# Patient Record
Sex: MALE | Race: WHITE | NOT HISPANIC OR LATINO | Employment: FULL TIME | URBAN - METROPOLITAN AREA
[De-identification: names, ages, dates, MRNs, and addresses within clinical notes are randomized per-mention and may not be internally consistent; named-entity substitution may affect disease eponyms.]

---

## 2017-02-17 ENCOUNTER — TRANSCRIBE ORDERS (OUTPATIENT)
Dept: ADMINISTRATIVE | Facility: HOSPITAL | Age: 57
End: 2017-02-17

## 2017-02-17 ENCOUNTER — ALLSCRIPTS OFFICE VISIT (OUTPATIENT)
Dept: OTHER | Facility: OTHER | Age: 57
End: 2017-02-17

## 2017-02-17 ENCOUNTER — HOSPITAL ENCOUNTER (OUTPATIENT)
Dept: RADIOLOGY | Facility: HOSPITAL | Age: 57
Discharge: HOME | End: 2017-02-17
Attending: FAMILY MEDICINE
Payer: COMMERCIAL

## 2017-02-17 DIAGNOSIS — J20.9 ACUTE BRONCHITIS: ICD-10-CM

## 2017-02-17 PROCEDURE — 71020 HB CHEST X-RAY 2VW FRONTAL&LATL: CPT

## 2017-02-18 ENCOUNTER — ALLSCRIPTS OFFICE VISIT (OUTPATIENT)
Dept: OTHER | Facility: OTHER | Age: 57
End: 2017-02-18

## 2017-02-20 ENCOUNTER — GENERIC CONVERSION - ENCOUNTER (OUTPATIENT)
Dept: OTHER | Facility: OTHER | Age: 57
End: 2017-02-20

## 2017-04-29 ENCOUNTER — ALLSCRIPTS OFFICE VISIT (OUTPATIENT)
Dept: OTHER | Facility: OTHER | Age: 57
End: 2017-04-29

## 2017-05-01 ENCOUNTER — GENERIC CONVERSION - ENCOUNTER (OUTPATIENT)
Dept: OTHER | Facility: OTHER | Age: 57
End: 2017-05-01

## 2017-05-01 LAB
A/G RATIO (HISTORICAL): 1.3 (ref 1.2–2.2)
ALBUMIN SERPL BCP-MCNC: 3.9 G/DL (ref 3.5–5.5)
ALP SERPL-CCNC: 81 IU/L (ref 39–117)
ALT SERPL W P-5'-P-CCNC: 24 IU/L (ref 0–44)
AST SERPL W P-5'-P-CCNC: 23 IU/L (ref 0–40)
BASOPHILS # BLD AUTO: 0 %
BASOPHILS # BLD AUTO: 0 X10E3/UL (ref 0–0.2)
BILIRUB SERPL-MCNC: 0.4 MG/DL (ref 0–1.2)
BUN SERPL-MCNC: 20 MG/DL (ref 6–24)
BUN/CREA RATIO (HISTORICAL): 27 (ref 9–20)
CALCIUM SERPL-MCNC: 9.4 MG/DL (ref 8.7–10.2)
CHLORIDE SERPL-SCNC: 102 MMOL/L (ref 96–106)
CO2 SERPL-SCNC: 22 MMOL/L (ref 18–29)
CREAT SERPL-MCNC: 0.74 MG/DL (ref 0.76–1.27)
DEPRECATED RDW RBC AUTO: 13.2 % (ref 12.3–15.4)
EGFR AFRICAN AMERICAN (HISTORICAL): 118 ML/MIN/1.73
EGFR-AMERICAN CALC (HISTORICAL): 102 ML/MIN/1.73
EOSINOPHIL # BLD AUTO: 0.2 X10E3/UL (ref 0–0.4)
EOSINOPHIL # BLD AUTO: 1 %
ERYTHROCYTE SEDIMENTATION RATE (HISTORICAL): 10 MM/HR (ref 0–30)
GLUCOSE SERPL-MCNC: 108 MG/DL (ref 65–99)
HCT VFR BLD AUTO: 44 % (ref 37.5–51)
HGB BLD-MCNC: 14.8 G/DL (ref 12.6–17.7)
IMM.GRANULOCYTES (CD4/8) (HISTORICAL): 0 %
IMM.GRANULOCYTES (CD4/8) (HISTORICAL): 0 X10E3/UL (ref 0–0.1)
LYMPHOCYTES # BLD AUTO: 1.8 X10E3/UL (ref 0.7–3.1)
LYMPHOCYTES # BLD AUTO: 17 %
MCH RBC QN AUTO: 31.4 PG (ref 26.6–33)
MCHC RBC AUTO-ENTMCNC: 33.6 G/DL (ref 31.5–35.7)
MCV RBC AUTO: 93 FL (ref 79–97)
MONOCYTES # BLD AUTO: 0.8 X10E3/UL (ref 0.1–0.9)
MONOCYTES (HISTORICAL): 7 %
NEUTROPHILS # BLD AUTO: 7.7 X10E3/UL (ref 1.4–7)
NEUTROPHILS # BLD AUTO: 75 %
PLATELET # BLD AUTO: 336 X10E3/UL (ref 150–379)
POTASSIUM SERPL-SCNC: 5.1 MMOL/L (ref 3.5–5.2)
RBC (HISTORICAL): 4.72 X10E6/UL (ref 4.14–5.8)
SODIUM SERPL-SCNC: 141 MMOL/L (ref 134–144)
TOT. GLOBULIN, SERUM (HISTORICAL): 2.9 G/DL (ref 1.5–4.5)
TOTAL PROTEIN (HISTORICAL): 6.8 G/DL (ref 6–8.5)
WBC # BLD AUTO: 10.5 X10E3/UL (ref 3.4–10.8)

## 2017-05-02 ENCOUNTER — GENERIC CONVERSION - ENCOUNTER (OUTPATIENT)
Dept: OTHER | Facility: OTHER | Age: 57
End: 2017-05-02

## 2017-05-02 LAB
25(OH)D3 SERPL-MCNC: 38 NG/ML (ref 30–100)
ANTINUCLEAR ANTIBODIES, IFA (HISTORICAL): NEGATIVE
CYCLIC CITRULLINATED PEPTIDE ANTIBODY (HISTORICAL): 8 UNITS (ref 0–19)
LYME IGG/IGM AB (HISTORICAL): <0.91 ISR (ref 0–0.9)
LYME IGM (HISTORICAL): <0.8 INDEX (ref 0–0.79)
RA LATEX TURBID (HISTORICAL): <10 IU/ML (ref 0–13.9)
TSH SERPL DL<=0.05 MIU/L-ACNC: 1.2 UIU/ML (ref 0.45–4.5)

## 2017-05-03 ENCOUNTER — GENERIC CONVERSION - ENCOUNTER (OUTPATIENT)
Dept: OTHER | Facility: OTHER | Age: 57
End: 2017-05-03

## 2017-06-15 ENCOUNTER — GENERIC CONVERSION - ENCOUNTER (OUTPATIENT)
Dept: OTHER | Facility: OTHER | Age: 57
End: 2017-06-15

## 2017-06-20 ENCOUNTER — ALLSCRIPTS OFFICE VISIT (OUTPATIENT)
Dept: OTHER | Facility: OTHER | Age: 57
End: 2017-06-20

## 2017-06-20 ENCOUNTER — APPOINTMENT (OUTPATIENT)
Dept: RADIOLOGY | Facility: CLINIC | Age: 57
End: 2017-06-20
Payer: COMMERCIAL

## 2017-06-20 DIAGNOSIS — M25.511 PAIN IN RIGHT SHOULDER: ICD-10-CM

## 2017-06-20 DIAGNOSIS — M25.512 PAIN IN LEFT SHOULDER: ICD-10-CM

## 2017-06-20 PROCEDURE — 73030 X-RAY EXAM OF SHOULDER: CPT

## 2017-06-30 ENCOUNTER — GENERIC CONVERSION - ENCOUNTER (OUTPATIENT)
Dept: OTHER | Facility: OTHER | Age: 57
End: 2017-06-30

## 2017-11-10 ENCOUNTER — GENERIC CONVERSION - ENCOUNTER (OUTPATIENT)
Dept: OTHER | Facility: OTHER | Age: 57
End: 2017-11-10

## 2017-11-13 ENCOUNTER — ALLSCRIPTS OFFICE VISIT (OUTPATIENT)
Dept: OTHER | Facility: OTHER | Age: 57
End: 2017-11-13

## 2017-11-13 DIAGNOSIS — M79.10 MYALGIA: ICD-10-CM

## 2017-11-14 ENCOUNTER — GENERIC CONVERSION - ENCOUNTER (OUTPATIENT)
Dept: OTHER | Facility: OTHER | Age: 57
End: 2017-11-14

## 2017-11-14 NOTE — PROGRESS NOTES
Assessment    1  Myalgia (729 1) (M79 1)   2  BMI 25 0-25 9,adult (V85 21) (Z68 25)   3  Muscle atrophy (728 2) (M62 50)    Plan  Myalgia    · (1) ANAPLASMA PHAGOCYTOPHILUM, PCR; Status:Active; Requested for:13Nov2017;    · (1) BABESIA MICROTI ANTIBODY, IGG & IGM; Status:Active; Requested for:13Nov2017;    · (1) BARTONELLA ANITBODY PANEL; Status:Active; Requested GM:[id number];    · (1) EHRLICHIA ANTIBODY PANEL; Status:Active; Requested for:13Nov2017;    · (1) CORIN MOUNTAIN SPOTTED FEVER IGG ANTIBODY; Status:Active; Requestedfor:13Nov2017;    · (1) SPECIAL TEST; Status:Active; Requested for:13Nov2017;    · (LC) Heavy Metals Profile II, Blood; Status:Active; Requested for:13Nov2017;    · * MRI BRAIN MS WO AND W CONTRAST; Status:Need Information - FinancialAuthorization; Requested for:13Nov2017;     Discussion/Summary  The patient was counseled regarding risk factor reductions,-- risks and benefits of treatment options  Provider Comments  Provider Comments:   try ID w/uf/u vs 2nd opiniontesting neg thus farMG/MS/ALSbrain if can get covered      Chief Complaint  Seen to discuss issues with the Doctor  er/cma  History of Present Illness  HPI: saw rheum in [facility], on 30d plaquenil, dx with ? arthritis, Dr Nahum Ruth over past 7muri at onsetand arms mo b/lachesprednisone for 4m with benefitneurologist Dr Lilian Nguyen revealing, not MS or ALS per himneg spineDr  Dru Gera, ID, tick borne? Review of Systems   Cardiovascular: no chest pain  Respiratory: no shortness of breath-- and-- no wheezing  Neurological: no dizziness-- and-- no fainting  Active Problems    1  Actinic keratosis (702 0) (L57 0)   2  Allergic reaction (995 3) (T78 40XA)   3  Colon cancer screening (V76 51) (Z12 11)   4  Complete tear of right rotator cuff (727 61) (M75 121)   5  Immunization due (V05 9) (Z23)   6  Overweight (278 02) (E66 3)   7  Prostate cancer screening (V76 44) (Z12 5)   8   Screening for cardiovascular, respiratory, and genitourinary diseases (V81 2,V81 4,V81 6) (Z13 6,Z13 83,Z13 89)   9  Screening for diabetes mellitus (DM) (V77 1) (Z13 1)   10  Screening for hypothyroidism (V77 0) (Z13 29)   11  Status post subacromial decompression (V45 89) (Z98 890)   12  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 41) (F51 02)   13  Well adult on routine health check (V70 0) (Z00 00)    Past Medical History  1  History of Arm pain (729 5) (M79 603)   2  History of Backache (724 5) (M54 9)   3  History of Bilateral shoulder pain (719 41) (M25 511,M25 512)   4  History of Elevated BP without diagnosis of hypertension (796 2) (R03 0)   5  History of acute bronchitis (V12 69) (Z87 09)   6  History of acute bronchitis (V12 69) (Z87 09)   7  History of joint pain (V13 59) (Z87 39)   8  History of low back pain (V13 59) (Z87 39)   9  History of low back pain (V13 59) (Z87 39)   10  History of muscle pain (V13 59) (Z87 39)   11  History of tinea corporis (V12 09) (Z86 19)   12  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)   13  History of Neoplasm of skin (239 2) (D49 2)   14  History of Nonallopathic lesion (739 9) (M99 9)   15  History of Skin neoplasm (239 2) (D49 2)    Family History  Mother    1  Family history of Hypertension  Father    2  Family history of Diabetes mellitus  Family History    3  Family history of Diabetes Mellitus (V18 0)  Family History Reviewed: The family history was reviewed and updated today  Social History   · Being A Social Drinker   · Former smoker (K47 01) (R25 111)  The social history was reviewed and updated today  Surgical History    1  Denied: History of Reported Prior Surgical / Procedural History    Current Meds   1  Acidophilus CAPS; take 1 capsule daily; Therapy: (Recorded:13Nov2017) to Recorded   2  Multi-Vitamin Oral Tablet; 1 Every Day; Therapy: 77YOB6224 to  Requested for: 76Ogl2084 Recorded   3   Plaquenil 200 MG Oral Tablet; TAKE 1 TABLET TWICE DAILY WITH FOOD; Therapy: (Recorded:02Hus0695) to Recorded    Allergies  1  Azithromycin TABS    Vitals   Recorded: 92CKF6247 03:51PM   Temperature 97 4 F   Heart Rate 88   Respiration 20   Systolic 731   Diastolic 98   Height 5 ft 8 in   Weight 166 lb    BMI Calculated 25 24   BSA Calculated 1 89       Physical Exam   Constitutional  General appearance: No acute distress, well appearing and well nourished  Eyes  Conjunctiva and lids: No swelling, erythema, or discharge  Pupils and irises: Equal, round and reactive to light  Ears, Nose, Mouth, and Throat  External inspection of ears and nose: Normal    Otoscopic examination: Tympanic membrance translucent with normal light reflex  Canals patent without erythema  Nasal mucosa, septum, and turbinates: Normal without edema or erythema  Oropharynx: Normal with no erythema, edema, exudate or lesions  Pulmonary  Respiratory effort: No increased work of breathing or signs of respiratory distress  Auscultation of lungs: Clear to auscultation, equal breath sounds bilaterally, no wheezes, no rales, no rhonci  Cardiovascular  Auscultation of heart: Normal rate and rhythm, normal S1 and S2, without murmurs  Examination of extremities for edema and/or varicosities: Normal    Carotid pulses: Normal    Abdomen  Abdomen: Non-tender, no masses  Lymphatic  Palpation of lymph nodes in neck: No lymphadenopathy  Musculoskeletal  Gait and station: Normal    Digits and nails: Normal without clubbing or cyanosis  Inspection/palpation of joints, bones, and muscles: Abnormal  -- shouder muscle wasting b/l  Skin  Skin and subcutaneous tissue: Normal without rashes or lesions  Neurologic  Reflexes: 2+ and symmetric  Sensation: No sensory loss     Psychiatric  Mood and affect: Normal          Results/Data  PHQ-2 Adult Depression Screening 10FTY1156 03:53PM User, [employer]     Test Name Result Flag Reference   PHQ-2 Adult Depression Score 0       Over the last two weeks, how often have you been bothered by any of the following problems?  Little interest or pleasure in doing things: Not at all - 0 Feeling down, depressed, or hopeless: Not at all - 0   PHQ-2 Adult Depression Screening Negative           Signatures   Electronically signed by : Kaity Reddy DO; Nov 13 2017  8:52PM EST                       (Author)

## 2017-11-15 LAB
ARSENIC BLOOD (HISTORICAL): 8 UG/L (ref 2–23)
LEAD BLOOD (HISTORICAL): 3 UG/DL (ref 0–19)
Lab: 0.23 INDEX (ref 0–0.89)
Lab: NORMAL UG/L (ref 0–1.2)
MERCURY BLOOD (HISTORICAL): NORMAL UG/L (ref 0–14.9)

## 2017-11-16 LAB
B. HENSELAE IGG (HISTORICAL): NEGATIVE TITER
B. HENSELAE IGM (HISTORICAL): NEGATIVE TITER
B. QUINTANA IGG (HISTORICAL): NEGATIVE TITER
B. QUINTANA IGM (HISTORICAL): NEGATIVE TITER
BABESIA MICROTI IGG (HISTORICAL): NORMAL
BABESIA MICROTI IGM (HISTORICAL): NORMAL
EHRLICHIA (HME) IGG (HISTORICAL): NEGATIVE
EHRLICHIA (HME) IGM (HISTORICAL): NEGATIVE
HGE IGG (HISTORICAL): NEGATIVE
HGE IGM (HISTORICAL): NEGATIVE
Lab: ABNORMAL
Lab: POSITIVE

## 2017-11-17 ENCOUNTER — GENERIC CONVERSION - ENCOUNTER (OUTPATIENT)
Dept: OTHER | Facility: OTHER | Age: 57
End: 2017-11-17

## 2017-11-17 LAB — ANAPLASMA PHAGOCYTOPHILUM, PCR (HISTORICAL): NEGATIVE

## 2017-11-18 ENCOUNTER — GENERIC CONVERSION - ENCOUNTER (OUTPATIENT)
Dept: OTHER | Facility: OTHER | Age: 57
End: 2017-11-18

## 2017-11-21 LAB
Lab: ABNORMAL
Lab: NEGATIVE

## 2017-11-23 ENCOUNTER — GENERIC CONVERSION - ENCOUNTER (OUTPATIENT)
Dept: OTHER | Facility: OTHER | Age: 57
End: 2017-11-23

## 2017-12-29 ENCOUNTER — ALLSCRIPTS OFFICE VISIT (OUTPATIENT)
Dept: OTHER | Facility: OTHER | Age: 57
End: 2017-12-29

## 2018-01-02 ENCOUNTER — ALLSCRIPTS OFFICE VISIT (OUTPATIENT)
Dept: OTHER | Facility: OTHER | Age: 58
End: 2018-01-02

## 2018-01-02 DIAGNOSIS — M25.511 PAIN IN RIGHT SHOULDER: ICD-10-CM

## 2018-01-04 NOTE — PROGRESS NOTES
Assessment   1  Left shoulder pain (719 41) (M25 512)   2  Weakness of left arm (729 89) (R29 898)    Plan   Left shoulder pain    · * MRI SHOULDER LEFT WO CONTRAST; Status:Need Information - Financial    Authorization,Retrospective By Protocol Authorization; Requested RUBYKURTIS:[id number];     Chief Complaint   Patient returns for follow-up left shoulder pain times several months  Discussion/Summary      At this time, we will order an MRI to further evaluate the left shoulder and evaluate for rotator cuff tear weightbearing and activities as tolerated control if condition worsens pending MRI  History of Present Illness   She is a 48-year-old, right-hand-dominant male who presents for follow-up to left shoulder pain times several months  He was last seen in the office on June 20, 2017 for the same issue  He reports has been attending physical therapy for his bilateral lower extremities  Physical therapist noticed weakness in his left arm and advised to follow-up with orthopedics  At that time his x-rays taken in the office were negative and it was decided that his pain was possibly related to a more systemic issue  He reports has since been followed by rheumatology and neurology  Patient further admits EMG testing performed in the past has been negative   is complaining of anterior left arm pain, throbbing and achy with radiation distally to mid forearm he does note associated left arm weakness, particularly with AB duction and forward extension  Review of Systems        Constitutional: no fever,-- not feeling poorly-- and-- no chills  Eyes: eyes not red-- and-- no eyesight problems  ENT: no nosebleeds  Cardiovascular: no chest pain-- and-- no palpitations  Respiratory: no cough  Gastrointestinal: no abdominal pain  Musculoskeletal: as noted in HPI  Neurological: no numbness-- and-- no tingling  ROS reviewed  Active Problems   1   Actinic keratosis (702 0) (L57 0)   2  Acute upper respiratory infection (465 9) (J06 9)   3  Allergic reaction (995 3) (T78 40XA)   4  BMI 25 0-25 9,adult (V85 21) (Z68 25)   5  Colon cancer screening (V76 51) (Z12 11)   6  Complete tear of right rotator cuff (727 61) (M75 121)   7  Costochondritis (733 6) (M94 0)   8  Immunization due (V05 9) (Z23)   9  Muscle atrophy (728 2) (M62 50)   10  Myalgia (729 1) (M79 1)   11  Overweight (278 02) (E66 3)   12  Prostate cancer screening (V76 44) (Z12 5)   13  Right shoulder pain (719 41) (M25 511)   14  [facility] spotted fever (082 0) (A77 0)   15  Screening for cardiovascular, respiratory, and genitourinary diseases      (V81 2,V81 4,V81 6) (Z13 6,Z13 83,Z13 89)   16  Screening for diabetes mellitus (DM) (V77 1) (Z13 1)   17  Screening for hypothyroidism (V77 0) (Z13 29)   18  Status post subacromial decompression (V45 89) (Z98 890)   19  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 41) (F51 02)   20  Tularemia (021 9) (A21 9)   21  Well adult on routine health check (V70 0) (Z00 00)    Past Medical History    · History of Arm pain (729 5) (M79 603)   · History of Backache (724 5) (M54 9)   · History of Bilateral shoulder pain (719 41) (M25 511,M25 512)   · History of Elevated BP without diagnosis of hypertension (796 2) (R03 0)   · History of acute bronchitis (V12 69) (Z87 09)   · History of acute bronchitis (V12 69) (Z87 09)   · History of joint pain (V13 59) (Z87 39)   · History of low back pain (V13 59) (Z87 39)   · History of low back pain (V13 59) (Z87 39)   · History of muscle pain (V13 59) (Z87 39)   · History of tinea corporis (V12 09) (Z86 19)   · 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)   · History of Neoplasm of skin (239 2) (D49 2)   · History of Nonallopathic lesion (739 9) (M99 9)   · History of Skin neoplasm (239 2) (D49 2)     The active problems and past medical history were reviewed and updated today        Surgical History    · Denied: History of Reported Prior Surgical / Procedural History     The surgical history was reviewed and updated today  Family History   Mother    · Family history of Hypertension  Father    · Family history of Diabetes mellitus  Family History    · Family history of Diabetes Mellitus (V18 0)     The family history was reviewed and updated today  Social History    · Being A Social Drinker   · Former smoker (X17 28) (Q44 155)  The social history was reviewed and updated today  Current Meds    1  Acidophilus CAPS; take 1 capsule daily; Therapy: (Recorded:13Nov2017) to Recorded   2  Multi-Vitamin Oral Tablet; 1 Every Day; Therapy: 41ADH8704 to  Requested for: 53Icc2713 Recorded     The medication list was reviewed and updated today  Allergies   1  Azithromycin TABS    Physical Exam      Abduction: painful restricted AROM 50 degrees  Internal rotation: painful restricted AROM L5 degrees  External rotation: painful restricted AROM 10 degrees  Motor: 2/5 forward flexion,-- 3/5 abduction,-- 2/5 external rotation-- and-- 4/5 internal rotation  Special Tests: positive Drop Arm test-- and-- positive Empty Can test  Left Shoulder Appearance[de-identified] Normal  Tenderness: None        Constitutional - General appearance: Normal       Musculoskeletal - Gait and station: Normal -- Digits and nails: Normal -- Muscle strength/tone: Normal -- Upper extremity compartments: Normal       Cardiovascular - Pulses: Normal -- Examination of extremities for edema and/or varicosities: Normal       Skin - Skin and subcutaneous tissue: Normal       Neurologic - Sensation: Normal -- Upper extremity peripheral neuro exam: Normal       Psychiatric - Orientation to person, place, and time: Normal -- Mood and affect: Normal       Eyes      Conjunctiva and lids: Normal        Pupils and irises: Normal        Attending Note   Scribe Attestation:      Amanda Bey PA-C am acting as a scribe in the presence of the attending physician while the attending physician examines the patient  Physician Attestation:      Debo Mcclellan MD personally performed the services described in this documentation as scribed in my presence, and it is both accurate and complete        Signatures    Electronically signed by : BRY Du; Jan 2 2018  3:03PM EST                       (Author)     Electronically signed by : SUMMER Hall ; Bryan  3 2018  8:09AM EST                       (Author)

## 2018-01-06 NOTE — PROGRESS NOTES
Assessment   1  Acute upper respiratory infection (465 9) (J06 9)   2  Costochondritis (733 6) (M94 0)   3  BMI 25 0-25 9,adult (V85 21) (Z68 25)   4  Former smoker (V15 82) (G34 662)    Plan    Promethazine-DM 6 25-15 MG/5ML Oral Syrup; TAKE 5 ML EVERY 4 TO 6 HOURS AS NEEDED; Therapy: 40NSH3723 to (140 511 21 19)  Requested for: 54Zma4494; Last Rx:42Fyh0490; Status: ACTIVE Ordered     Rx By: [place]; Dispense: 8 Days ; #:1 X 240 ML Bottle; Refill: 0;      For: Acute upper respiratory infection; EMETERIO = N; Verified Transmission to [facility] 59 #437; Last Updated By: System, [employer]; 1/2/2018 2:12:18 PM     Amoxicillin 875 MG Oral Tablet; TAKE 1 TABLET EVERY 12 HOURS DAILY; Therapy: 10ESK4433 to [phone])  Requested for: 12Ojh3702; Last Rx:16Vrx2121; Status: ACTIVE Ordered     Rx By: [place] Ray; Dispense: 10 Days ; #:20 Tablet; Refill: 0;      For: Acute upper respiratory infection; EMETERIO = N; Verified Transmission to [facility] #437; Last Updated By: System, [employer]; 1/2/2018 2:12:18 PM      Discussion/Summary      Advised on supportive care  Start antibiotics if no improvement in symptoms in 2-3 days  Follow up as needed for persistent or worsening symptoms  Possible side effects of new medications were reviewed with the patient/guardian today  The treatment plan was reviewed with the patient/guardian  The patient/guardian understands and agrees with the treatment plan      Chief Complaint   Pt c/o head and chest congestion with a cough for the past week  sp/cma      History of Present Illness   HPI: He has had cold symptoms for the past week  It is now in his chest and he is coughing up yellowish sputum  His chest feels tight and he has pains in his back with the cough  Taking Tylenol and Robitussin OTC  Review of Systems        Constitutional: no fever-- and-- no chills  ENT: as noted in HPI        Respiratory: cough, but-- no shortness of breath-- and-- no wheezing  Gastrointestinal: no abdominal pain,-- no nausea,-- no vomiting-- and-- no diarrhea  Neurological: headache  Active Problems   1  Actinic keratosis (702 0) (L57 0)   2  Allergic reaction (995 3) (T78 40XA)   3  BMI 25 0-25 9,adult (V85 21) (Z68 25)   4  Colon cancer screening (V76 51) (Z12 11)   5  Complete tear of right rotator cuff (727 61) (M75 121)   6  Immunization due (V05 9) (Z23)   7  Muscle atrophy (728 2) (M62 50)   8  Myalgia (729 1) (M79 1)   9  Overweight (278 02) (E66 3)   10  Prostate cancer screening (V76 44) (Z12 5)   11  [facility] spotted fever (082 0) (A77 0)   12  Screening for cardiovascular, respiratory, and genitourinary diseases      (V81 2,V81 4,V81 6) (Z13 6,Z13 83,Z13 89)   13  Screening for diabetes mellitus (DM) (V77 1) (Z13 1)   14  Screening for hypothyroidism (V77 0) (Z13 29)   15  Status post subacromial decompression (V45 89) (Z98 890)   16  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 41) (F51 02)   17  Tularemia (021 9) (A21 9)   18  Well adult on routine health check (V70 0) (Z00 00)    Past Medical History   1  History of Arm pain (729 5) (M79 603)   2  History of Backache (724 5) (M54 9)   3  History of Bilateral shoulder pain (719 41) (M25 511,M25 512)   4  History of Elevated BP without diagnosis of hypertension (796 2) (R03 0)   5  History of acute bronchitis (V12 69) (Z87 09)   6  History of acute bronchitis (V12 69) (Z87 09)   7  History of joint pain (V13 59) (Z87 39)   8  History of low back pain (V13 59) (Z87 39)   9  History of low back pain (V13 59) (Z87 39)   10  History of muscle pain (V13 59) (Z87 39)   11  History of tinea corporis (V12 09) (Z86 19)   12  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)   13  History of Neoplasm of skin (239 2) (D49 2)   14  History of Nonallopathic lesion (739 9) (M99 9)   15   History of Skin neoplasm (239 2) (D49 2)  Active Problems And Past Medical History Reviewed: The active problems and past medical history were reviewed and updated today  Family History   Mother    1  Family history of Hypertension  Father    2  Family history of Diabetes mellitus  Family History    3  Family history of Diabetes Mellitus (V18 0)    Social History    · Being A Social Drinker   · Former smoker (W19 47) (M67 600)  The social history was reviewed and is unchanged  Surgical History   1  Denied: History of Reported Prior Surgical / Procedural History    Current Meds    1  Acidophilus CAPS; take 1 capsule daily; Therapy: (Recorded:26Ljm3038) to Recorded   2  Multi-Vitamin Oral Tablet; 1 Every Day; Therapy: 57ACY3847 to  Requested for: 47Zmr7642 Recorded     The medication list was reviewed and updated today  Allergies   1  Azithromycin TABS    Vitals    Recorded: 73QXI6911 03:11PM   Temperature 97 8 F   Heart Rate 80   Respiration 16   Systolic 380   Diastolic 90   Weight 475 lb    BMI Calculated 25 09   BSA Calculated 1 88     Physical Exam        Constitutional      General appearance: No acute distress, well appearing and well nourished  Eyes      Conjunctiva and lids: No swelling, erythema, or discharge  Ears, Nose, Mouth, and Throat      Otoscopic examination: Tympanic membrance translucent with normal light reflex  Canals patent without erythema  Nasal mucosa, septum, and turbinates: Normal without edema or erythema  Oropharynx: Normal with no erythema, edema, exudate or lesions  Pulmonary      Respiratory effort: No increased work of breathing or signs of respiratory distress  Auscultation of lungs: Clear to auscultation, equal breath sounds bilaterally, no wheezes, no rales, no rhonci  Cardiovascular      Auscultation of heart: Normal rate and rhythm, normal S1 and S2, without murmurs         Examination of extremities for edema and/or varicosities: Normal        Lymphatic      Palpation of lymph nodes in neck: No lymphadenopathy  Skin      Skin and subcutaneous tissue: Normal without rashes or lesions  Psychiatric      Mood and affect: Normal           Attending Note   Collaborating Physician Note: Collaborating Note: I agree with the Advanced Practitioner note        Signatures    Electronically signed by : Sydney Fox; Dec 29 2017  3:34PM EST                       (Author)     Electronically signed by : Neva Esposito DO; Jan 5 2018 11:51PM EST                       (Author)

## 2018-01-10 NOTE — RESULT NOTES
Discussion/Summary   labs stable     Verified Results  (LC) Lyme Ab/Western Blot Reflex [id number] 07:27AM Flores Butter     Test Name Result Flag Reference   Lyme IgG/IgM Ab <0 91 ISR  0 00-0 90   Negative         <0 91                                                 Equivocal  0 91 - 1 09                                                 Positive         >1 09   Lyme Disease Ab, Quant, IgM <0 80 index  0 00-0 79   Negative         <0 80                                                 Equivocal  0 80 - 1 19                                                 Positive         >1 19                  IgM levels may peak at 3-6 weeks post infection, then                  gradually decline

## 2018-01-10 NOTE — RESULT NOTES
Verified Results  [facility] Pathology Report [id number] 12:00AM Oriana Casillas     Test Name Result Flag Reference     Comment     Material submitted:                                          RIGHT ANTERIOR CHEST WALL     Comment     Clinical history:                                            R/O SCC OR BCC     Comment     **********************************************************************  Diagnosis:  RIGHT ANTERIOR CHEST WALL:  SEBORRHEIC KERATOSIS, VERRUCOUS  PIN/05/31/2016  **********************************************************************     Comment     Electronically signed:                                        Nakul Kent MD, Dermatopathologist     Comment     Gross description:                                           RIGHT ANTERIOR CHEST WALL:  Received in formalin is 1 piece of skin measuring 0 4 x 0 3 x 0 1 cm  which is inked bisected and submitted in toto in 1 cassette  /FGA  FGA/FGA     Comment     Pathologist provided ICD-10:  L82 1     Comment     CPT                                                          270366       Discussion/Summary   The growth from the chest wall is BENIGN  It was a seborrheic keratosis, a growth triggered by age and sun but not precancerous    Dr Sherita Kimball

## 2018-01-11 NOTE — RESULT NOTES
Discussion/Summary   Test for anaplasma infection is normal   Dr Carlos Cardoza        Verified Results  [facility]) A  phagocytophilum PCR [id number] 03:06PM Chai Monroy     Test Name Result Flag Reference   A  phagocytophilum PCR Negative  Negative   No Anaplasma phagocytophilum DNA detected  A  phagocytophilum has been  characterized as the causative agent of Human Granulocytic  Ehrlichiosis (HGE)  This test was developed and its performance characteristics  determined by [employer]  It has not been cleared or approved  by the Food and Drug Administration

## 2018-01-11 NOTE — RESULT NOTES
Discussion/Summary   labs acceptable     Verified Results  (LC) Antinuclear Antibodies, IFA [id number] 07:27AM [facility]     Test Name Result Flag Reference   Antinuclear Antibodies, IFA Negative     Negative   <1:80                                                      Borderline  1:80                                                      Positive   >1:80     (LC) CCP Antibodies IgG/IgA 73PMO7424 07:27AM [facility]     Test Name Result Flag Reference   CCP Antibodies IgG/IgA 8 units  0-19   Negative               <20                                           Weak positive      20 - 39                                           Moderate positive  40 - 59                                           Strong positive        >59

## 2018-01-13 VITALS
DIASTOLIC BLOOD PRESSURE: 84 MMHG | HEART RATE: 84 BPM | TEMPERATURE: 97.6 F | WEIGHT: 172 LBS | HEIGHT: 67 IN | BODY MASS INDEX: 27 KG/M2 | SYSTOLIC BLOOD PRESSURE: 124 MMHG | RESPIRATION RATE: 18 BRPM

## 2018-01-13 VITALS
HEIGHT: 68 IN | SYSTOLIC BLOOD PRESSURE: 124 MMHG | WEIGHT: 166.2 LBS | DIASTOLIC BLOOD PRESSURE: 78 MMHG | HEART RATE: 73 BPM | BODY MASS INDEX: 25.19 KG/M2

## 2018-01-13 NOTE — RESULT NOTES
Discussion/Summary   Blood test indicates exposure to bacteria that causes Tularemia  This is also treated with the antibiotic you were prescribed, Doxycycline  Dr Yudi Vallejo Abs [id number] 03:06PM Mal Core     Test Name Result Flag Reference   FRANCISELLA TULARENSIS Lisa Locke 112 See below: A    Positive at 1:512  REFERENCE RANGE: Reference Range:   Negative   FRANCISELLA TULARENSIS IGM Negative     The performance characteristics of the listed assays were  validated by [employer] has not  approved or cleared these test  The results of these  assays can be used for clinical diagnosis without FDA  approval  [facility]   is a CLIA certified,  CAP accredited laboratory for performing high complexity  assays such as these    REFERENCE RANGE: Reference Range:   Negative

## 2018-01-14 VITALS
WEIGHT: 166 LBS | HEART RATE: 88 BPM | RESPIRATION RATE: 20 BRPM | HEIGHT: 68 IN | DIASTOLIC BLOOD PRESSURE: 98 MMHG | BODY MASS INDEX: 25.16 KG/M2 | TEMPERATURE: 97.4 F | SYSTOLIC BLOOD PRESSURE: 130 MMHG

## 2018-01-14 VITALS
SYSTOLIC BLOOD PRESSURE: 122 MMHG | RESPIRATION RATE: 20 BRPM | BODY MASS INDEX: 27.15 KG/M2 | DIASTOLIC BLOOD PRESSURE: 94 MMHG | HEART RATE: 76 BPM | WEIGHT: 173 LBS | HEIGHT: 67 IN | TEMPERATURE: 98.6 F

## 2018-01-14 NOTE — RESULT NOTES
Discussion/Summary   labs acceptable     Verified Results  (1) CBC/PLT/DIFF [id number] 07:27AM Bhuepndra Chauhan     Test Name Result Flag Reference   WBC 10 5 x10E3/uL  3 4-10 8   RBC 4 72 x10E6/uL  4 14-5 80   Hemoglobin 14 8 g/dL  12 6-17 7   Hematocrit 44 0 %  37 5-51 0   MCV 93 fL  79-97   MCH 31 4 pg  26 6-33 0   MCHC 33 6 g/dL  31 5-35 7   RDW 13 2 %  12 3-15 4   Platelets 403 Y22H7/BURGOS  150-379   Neutrophils 75 %     Lymphs 17 %     Monocytes 7 %     Eos 1 %     Basos 0 %     Neutrophils (Absolute) 7 7 x10E3/uL H 1 4-7 0   Lymphs (Absolute) 1 8 x10E3/uL  0 7-3 1   Monocytes(Absolute) 0 8 x10E3/uL  0 1-0 9   Eos (Absolute) 0 2 x10E3/uL  0 0-0 4   Baso (Absolute) 0 0 x10E3/uL  0 0-0 2   Immature Granulocytes 0 %     Immature Grans (Abs) 0 0 x10E3/uL  0 0-0 1     (1) COMPREHENSIVE METABOLIC PANEL [id number] 56:64CA Bhupendra Chauhan     Test Name Result Flag Reference   Glucose, Serum 108 mg/dL H 65-99   BUN 20 mg/dL  6-24   Creatinine, Serum 0 74 mg/dL L 0 76-1 27   BUN/Creatinine Ratio 27 H 9-20   Sodium, Serum 141 mmol/L  134-144   Potassium, Serum 5 1 mmol/L  3 5-5 2   Chloride, Serum 102 mmol/L     Carbon Dioxide, Total 22 mmol/L  18-29   Calcium, Serum 9 4 mg/dL  8 7-10 2   Protein, Total, Serum 6 8 g/dL  6 0-8 5   Albumin, Serum 3 9 g/dL  3 5-5 5   Globulin, Total 2 9 g/dL  1 5-4 5   A/G Ratio 1 3  1 2-2 2   Bilirubin, Total 0 4 mg/dL  0 0-1 2   Alkaline Phosphatase, S 81 IU/L     AST (SGOT) 23 IU/L  0-40   ALT (SGPT) 24 IU/L  0-44   eGFR If NonAfricn Am 102 mL/min/1 73  >59   eGFR If Africn Am 118 mL/min/1 73  >59     (1) TSH [id number] 07:27AM Leim Gissel     Test Name Result Flag Reference   TSH 1 200 uIU/mL  0 450-4 500     (LC) Sedimentation Rate-Westergren [id number] 07:27AM Leim Gissel     Test Name Result Flag Reference   Sedimentation Rate-Westergren 10 mm/hr  0-30     (LC) Rheumatoid Arthritis Factor [id number] 07:27AM Leim Gissel     Test Name Result Flag Reference   RA Latex Turbid  <10 0 IU/mL  0 0-13 9     (1) VITAMIN D 25-HYDROXY [id number] 07:27AM Ricardo Kennedy     Test Name Result Flag Reference   Vitamin D, 25-Hydroxy 38 0 ng/mL  30 0-100 0   Vitamin D deficiency has been defined by the [address] practice guideline as a  level of serum 25-OH vitamin D less than 20 ng/mL (1,2)  The Endocrine Society went on to further define vitamin D  insufficiency as a level between 21 and 29 ng/mL (2)  1  IOM ([place] [employer])  2010  Dietary reference     intakes for calcium and D  [facility]: The     [employer]  2  John Paul MF, Calixto NC, Davie MCMULLEN, et al      Evaluation, treatment, and prevention of vitamin D     deficiency: an Endocrine Society clinical practice     guideline  JCEM  2011 Jul; 96(7):1911-30

## 2018-01-15 VITALS
WEIGHT: 171 LBS | RESPIRATION RATE: 20 BRPM | HEIGHT: 67 IN | HEART RATE: 92 BPM | SYSTOLIC BLOOD PRESSURE: 128 MMHG | TEMPERATURE: 97.3 F | DIASTOLIC BLOOD PRESSURE: 88 MMHG | BODY MASS INDEX: 26.84 KG/M2

## 2018-01-15 NOTE — RESULT NOTES
Verified Results  [facility]) [facility] [place], IgG, [place] [id number] 03:06PM Kenton Farrell     Test Name Result Flag Reference   RMSF, IgG, EIA Positive A Negative   RMSF, IgG, IFA 1:256 H Neg <1:64   Negative           <1:64                                              Positive            1:64                                              Recent/Active      >1:64                 Titers of 1:64 are suggestive of past or possible                 current infection  Titers >1:64 are suggestive of                 recent or active infection  Approximately 9% of                 specimens positive by EIA screen are negative by IFA         [facility] spotted fever    · Doxycycline Hyclate 100 MG Oral Capsule; TAKE 1 CAPSULE TWICE DAILY  UNTIL GONE

## 2018-01-15 NOTE — RESULT NOTES
Verified Results  * XR CHEST PA & LATERAL [id number] 03:54PM Troy Duncan Order Number: [id number]     Test Name Result Flag Reference   XR CHEST PA & LATERAL (Report)     CHEST      INDICATION: Fever and cough  COMPARISON: None     VIEWS: Frontal and lateral projections; 2 images     FINDINGS:        Cardiomediastinal silhouette appears unremarkable  The lungs are clear  No pneumothorax or pleural effusion  Visualized osseous structures appear within normal limits for the patient's age  IMPRESSION:     Normal examination         Workstation performed: [id number]     Signed by:   Amanda To MD   2/20/17       Discussion/Summary   Your chest x-ray is normal    Dr Jamel Sorto

## 2018-01-16 ENCOUNTER — GENERIC CONVERSION - ENCOUNTER (OUTPATIENT)
Dept: OTHER | Facility: OTHER | Age: 58
End: 2018-01-16

## 2018-01-16 ENCOUNTER — ALLSCRIPTS OFFICE VISIT (OUTPATIENT)
Dept: OTHER | Facility: OTHER | Age: 58
End: 2018-01-16

## 2018-01-17 NOTE — PROGRESS NOTES
Assessment    1  Actinic keratosis (702 0) (L57 0)   2  Skin neoplasm (239 2) (D49 2)   3  Encounter for preventive health examination (V70 0) (Z00 00)   4  Body mass index (BMI) of 26 0-26 9 in adult (V85 22) (Z68 26)    Plan   Health Maintenance    · Fish Oil Concentrate 1000 MG Oral Capsule  Skin neoplasm    · [facility]) Pathology Report; Status: In Progress - Specimen/Data Collected;   Done:  [id number]  SOURCE OF SPECIMEN : right anterior chest wall shave biopsy    Follow-up visit in 1 year Evaluation and Treatment  Follow-up  Status: Hold For - Scheduling  Requested for: [id number]  Ordered; For: Body mass index (BMI) of 26 0-26 9 in adult;  Ordered By: Darlene Higginbotham  Performed:   Due: 75AHO6063     Discussion/Summary  Impression: health maintenance visit  Prostate cancer screening: the risks and benefits of prostate cancer screening were discussed  Testicular cancer screening: the risks and benefits of testicular cancer screening were discussed  Colorectal cancer screening: the risks and benefits of colorectal cancer screening were discussed  Advice and education were given regarding sunscreen use  Patient discussion: discussed with the patient  After risks and benefits, racw lesion was anesthetized with 1% lido with epi and shave bx performed and sent to pathology, hemostasis and topical abx placed  Cryo destruction done of 5 lesions of neck and shoulder areas b/l, tolerated well  The patient was counseled regarding instructions for management, risk factor reductions, impressions  Chief Complaint  CPE      History of Present Illness  HM, Adult Male: The patient is being seen for a health maintenance evaluation  General Health: The patient's health since the last visit is described as good  Immunizations status: up to date  Lifestyle:  He consumes a diverse and healthy diet  He does not have any weight concerns  He does not exercise regularly   He does not use tobacco    Screening:   HPI: multiple scaly chronic skin changes on shoulders and neck c/s AK  elevated newer growth RACW       Review of Systems    Constitutional: no fever and no chills  Cardiovascular: no chest pain  Respiratory: no shortness of breath  Gastrointestinal: no blood in stools  Neurological: no dizziness and no fainting  Psychiatric: no anxiety and no depression  Active Problems    1  Actinic keratosis (702 0) (L57 0)   2  Colon cancer screening (V76 51) (Z12 11)   3  Complete tear of right rotator cuff (727 61) (M75 121)   4  Encounter for screening for cardiovascular disorders (V81 2) (Z13 6)   5  Immunization due (V05 9) (Z23)   6  Prostate cancer screening (V76 44) (Z12 5)   7  Screening for diabetes mellitus (DM) (V77 1) (Z13 1)   8  Screening for hypothyroidism (V77 0) (Z13 29)   9  Screening for lipid disorders (V77 91) (Z13 220)   10  Status post subacromial decompression (V45 89) (Z98 89)   11  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 41) (F51 02)   12  Well adult on routine health check (V70 0) (Z00 00)    Past Medical History    · History of Backache (724 5) (M54 9)   · History of acute bronchitis (V12 69) (Z87 09)   · History of low back pain (V13 59) (Z87 39)   · History of low back pain (V13 59) (Z87 39)   · History of tinea corporis (V12 09) (Z86 19)   · 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)   · History of Neoplasm of skin (239 2) (D49 2)   · History of Nonallopathic lesion (739 9) (M99 9)    Surgical History    · Denied: History of Reported Prior Surgical / Procedural History    Family History  Mother    · Family history of Hypertension  Father    · Family history of Diabetes mellitus  Family History    · Family history of Diabetes Mellitus (V18 0)    Social History    · Being A Social Drinker   · Former smoker (Z07 10) (I26 623)    Current Meds   1  Acidophilus CAPS Recorded   2  Biotin CAPS Recorded   3   Calcium Citrate-Vitamin D 250-100 MG-UNIT TABS; 1 Every Day; Therapy: 19RGM9158 to  Requested for: 89Piz4264 Recorded   4  Fish Oil Concentrate 1000 MG Oral Capsule; 1 Every Day; Therapy: 10EZH9402 to  Requested for: 07Ssr6357 Recorded   5  Multi-Vitamin Oral Tablet; 1 Every Day; Therapy: 86AXK3419 to  Requested for: 02Sqz0896 Recorded    Allergies    1  No Known Drug Allergies    Vitals   Recorded: 73MVB3836 11:46AM   Temperature 96 8 F   Heart Rate 72   Respiration 20   Systolic 948   Diastolic 82   Height 5 ft 7 in   Weight 172 lb    BMI Calculated 26 94   BSA Calculated 1 9     Physical Exam    Constitutional   General appearance: No acute distress, well appearing and well nourished  Eyes   Conjunctiva and lids: No erythema, swelling or discharge  Pupils and irises: Equal, round, reactive to light  Ears, Nose, Mouth, and Throat   External inspection of ears and nose: Normal     Otoscopic examination: Tympanic membranes translucent with normal light reflex  Canals patent without erythema  Nasal mucosa, septum, and turbinates: Normal without edema or erythema  Lips, teeth, and gums: Normal, good dentition  Oropharynx: Normal with no erythema, edema, exudate or lesions  Neck   Neck: Supple, symmetric, trachea midline, no masses  Thyroid: Normal, no thyromegaly  Pulmonary   Respiratory effort: No increased work of breathing or signs of respiratory distress  Auscultation of lungs: Clear to auscultation  Cardiovascular   Auscultation of heart: Normal rate and rhythm, normal S1 and S2, no murmurs  Carotid pulses: 2+ bilaterally  Pedal pulses: 2+ bilaterally  Examination of extremities for edema and/or varicosities: Normal     Chest   Breasts: Normal, no dimpling or skin changes appreciated  Palpation of breasts and axillae: Normal, no masses palpated  Abdomen   Abdomen: Non-tender, no masses  Liver and spleen: No hepatomegaly or splenomegaly  Examination for hernias: No hernias appreciated      Genitourinary   Scrotal contents: Normal testes, no masses  Penis: Normal, no lesions  Digital rectal exam of prostate: Normal size, no masses  Lymphatic   Palpation of lymph nodes in neck: No lymphadenopathy  Palpation of lymph nodes in groin: No lymphadenopathy  Musculoskeletal   Gait and station: Normal     Inspection/palpation of digits and nails: Normal without clubbing or cyanosis  Inspection/palpation of joints, bones, and muscles: Normal     Range of motion: Normal     Stability: Normal     Muscle strength/tone: Normal     Skin   Skin and subcutaneous tissue: Normal without rashes or lesions  Palpation of skin and subcutaneous tissue: Normal turgor  Neurologic   Reflexes: 2+ and symmetric  Sensation: No sensory loss  Psychiatric   Judgment and insight: Normal     Mood and affect: Normal        Results/Data  PHQ-2 Adult Depression Screening [id number] 05:41PM Nadean Forward     Test Name Result Flag Reference   PHQ-2 Adult Depression Score 0     Over the last two weeks, how often have you been bothered by any of the following problems? Little interest or pleasure in doing things: Not at all - 0  Feeling down, depressed, or hopeless: Not at all - 0   PHQ-2 Adult Depression Screening Negative       [facility]) [facility] Default [id number] 09:54AM [facility] Number     Test Name Result Flag Reference   [facility] Default Comment     A hand-written panel/profile was received from your office  In  accordance with the [facility] Ambiguous Test Code Policy dated July 2376, we have completed your order by using the closest currently  or formerly recognized AMA panel  We have assigned Basic Metabolic  Panel (8), Test Code #245181 to this request  If this is not the  testing you wished to receive on this specimen, please contact the  [address] [place] [address] Client Inquiry/Technical Services Department to clarify the  test order  We appreciate your business       [facility]) Jennifer Dang  Default 65MPG7741 09:54AM Lewis Ayala Dread Stone     Test Name Result Flag Reference   [facility] Default Comment     A hand-written panel/profile was received from your office  In  accordance with the [facility] Ambiguous Test Code Policy dated July 3394, we have completed your order by using the closest currently  or formerly recognized AMA panel  We have assigned Lipid Panel,  Test Code #596222 to this request  If this is not the testing you  wished to receive on this specimen, please contact the [address] [place] [address]  Client Inquiry/Technical Services Department to clarify the test  order  We appreciate your business         Procedure    Procedure:   Results: 20/ in both eyes without corrective device, 20/15 in both eyes with corrective device, 20/20 in the right eye with corrective device, 20/20 in the left eye with corrective device      Provider Comments  Provider Comments:   destruction x 5  bx x 1      Signatures   Electronically signed by : Brown Hassan DO; May 25 2016  9:29PM EST                       (Author)

## 2018-01-23 VITALS
HEART RATE: 80 BPM | WEIGHT: 165 LBS | SYSTOLIC BLOOD PRESSURE: 126 MMHG | TEMPERATURE: 97.8 F | RESPIRATION RATE: 16 BRPM | BODY MASS INDEX: 25.09 KG/M2 | DIASTOLIC BLOOD PRESSURE: 90 MMHG

## 2018-01-24 VITALS
DIASTOLIC BLOOD PRESSURE: 90 MMHG | HEIGHT: 68 IN | BODY MASS INDEX: 25.37 KG/M2 | WEIGHT: 167.38 LBS | SYSTOLIC BLOOD PRESSURE: 130 MMHG

## 2018-02-02 LAB
APTT PPP: 31 SEC (ref 24–33)
BASOPHILS # BLD AUTO: 0 X10E3/UL (ref 0–0.2)
BASOPHILS NFR BLD AUTO: 0 %
BUN SERPL-MCNC: 16 MG/DL (ref 6–24)
BUN/CREAT SERPL: 23 (ref 9–20)
CALCIUM SERPL-MCNC: 9.9 MG/DL (ref 8.7–10.2)
CHLORIDE SERPL-SCNC: 99 MMOL/L (ref 96–106)
CO2 SERPL-SCNC: 25 MMOL/L (ref 18–29)
CREAT SERPL-MCNC: 0.69 MG/DL (ref 0.76–1.27)
EOSINOPHIL # BLD AUTO: 0.1 X10E3/UL (ref 0–0.4)
EOSINOPHIL NFR BLD AUTO: 1 %
ERYTHROCYTE [DISTWIDTH] IN BLOOD BY AUTOMATED COUNT: 13 % (ref 12.3–15.4)
GLUCOSE SERPL-MCNC: 95 MG/DL (ref 65–99)
HCT VFR BLD AUTO: 41.2 % (ref 37.5–51)
HGB BLD-MCNC: 14 G/DL (ref 13–17.7)
IMM GRANULOCYTES # BLD: 0 X10E3/UL (ref 0–0.1)
IMM GRANULOCYTES NFR BLD: 0 %
INR PPP: 1 (ref 0.8–1.2)
LYMPHOCYTES # BLD AUTO: 1.6 X10E3/UL (ref 0.7–3.1)
LYMPHOCYTES NFR BLD AUTO: 17 %
MCH RBC QN AUTO: 30.3 PG (ref 26.6–33)
MCHC RBC AUTO-ENTMCNC: 34 G/DL (ref 31.5–35.7)
MCV RBC AUTO: 89 FL (ref 79–97)
MONOCYTES # BLD AUTO: 0.9 X10E3/UL (ref 0.1–0.9)
MONOCYTES NFR BLD AUTO: 10 %
NEUTROPHILS # BLD AUTO: 6.8 X10E3/UL (ref 1.4–7)
NEUTROPHILS NFR BLD AUTO: 72 %
PLATELET # BLD AUTO: 420 X10E3/UL (ref 150–379)
POTASSIUM SERPL-SCNC: 4.5 MMOL/L (ref 3.5–5.2)
PROTHROMBIN TIME: 10.6 SEC (ref 9.1–12)
RBC # BLD AUTO: 4.62 X10E6/UL (ref 4.14–5.8)
SL AMB EGFR AFRICAN AMERICAN: 121 ML/MIN/1.73
SL AMB EGFR NON AFRICAN AMERICAN: 105 ML/MIN/1.73
SODIUM SERPL-SCNC: 141 MMOL/L (ref 134–144)
WBC # BLD AUTO: 9.6 X10E3/UL (ref 3.4–10.8)

## 2018-02-22 PROBLEM — E66.3 OVERWEIGHT: Status: ACTIVE | Noted: 2017-02-17

## 2018-02-22 PROBLEM — M75.102 LEFT ROTATOR CUFF TEAR: Status: ACTIVE | Noted: 2018-01-16

## 2018-02-22 PROBLEM — M79.10 MYALGIA: Status: ACTIVE | Noted: 2017-11-13

## 2018-02-22 PROBLEM — R79.1 ABNORMAL BLEEDING TIME: Status: ACTIVE | Noted: 2018-01-19

## 2018-02-22 PROBLEM — A21.9 TULAREMIA: Status: ACTIVE | Noted: 2017-11-23

## 2018-02-22 PROBLEM — R29.898 WEAKNESS OF LEFT ARM: Status: ACTIVE | Noted: 2018-01-02

## 2018-02-22 PROBLEM — M62.50 MUSCLE ATROPHY: Status: ACTIVE | Noted: 2017-11-13

## 2018-02-22 RX ORDER — HYDROXYCHLOROQUINE SULFATE 200 MG/1
1 TABLET, FILM COATED ORAL 2 TIMES DAILY
COMMUNITY
End: 2018-06-26

## 2018-02-23 ENCOUNTER — OFFICE VISIT (OUTPATIENT)
Dept: FAMILY MEDICINE CLINIC | Facility: CLINIC | Age: 58
End: 2018-02-23
Payer: COMMERCIAL

## 2018-02-23 VITALS
HEIGHT: 67 IN | HEART RATE: 72 BPM | RESPIRATION RATE: 18 BRPM | SYSTOLIC BLOOD PRESSURE: 112 MMHG | BODY MASS INDEX: 24.48 KG/M2 | DIASTOLIC BLOOD PRESSURE: 80 MMHG | TEMPERATURE: 96.6 F | WEIGHT: 156 LBS

## 2018-02-23 DIAGNOSIS — Z01.818 PRE-OP EXAMINATION: ICD-10-CM

## 2018-02-23 DIAGNOSIS — M75.102 TEAR OF LEFT ROTATOR CUFF, UNSPECIFIED TEAR EXTENT: Primary | ICD-10-CM

## 2018-02-23 PROBLEM — A21.9 TULAREMIA: Status: RESOLVED | Noted: 2017-11-23 | Resolved: 2018-02-23

## 2018-02-23 PROBLEM — Z51.81 ENCOUNTER FOR MONITORING OF HYDROXYCHLOROQUINE THERAPY: Status: ACTIVE | Noted: 2017-08-08

## 2018-02-23 PROBLEM — Z79.899 ENCOUNTER FOR MONITORING OF HYDROXYCHLOROQUINE THERAPY: Status: ACTIVE | Noted: 2017-08-08

## 2018-02-23 PROCEDURE — 93000 ELECTROCARDIOGRAM COMPLETE: CPT | Performed by: FAMILY MEDICINE

## 2018-02-23 PROCEDURE — 99244 OFF/OP CNSLTJ NEW/EST MOD 40: CPT | Performed by: FAMILY MEDICINE

## 2018-02-23 RX ORDER — HYDROXYCHLOROQUINE SULFATE 200 MG/1
200 TABLET, FILM COATED ORAL
COMMUNITY
Start: 2017-11-01 | End: 2018-02-23

## 2018-02-23 NOTE — PROGRESS NOTES
Assessment/Plan:    Medically cleared for surgery     Diagnoses and all orders for this visit:    Tear of left rotator cuff, unspecified tear extent    Other orders  -     hydroxychloroquine (PLAQUENIL) 200 mg tablet; Take 1 tablet by mouth Twice daily  -     Acetaminophen (TYLENOL) 325 MG CAPS; Take by mouth  -     hydroxychloroquine (PLAQUENIL) 200 mg tablet; Take 200 mg by mouth        ekg today and normal      No Follow-up on file  Subjective:      Patient ID: Eddie Luque is a 61 y o  male  Chief Complaint   Patient presents with    Pre-op Exam     Should left rotator cuff reppair       Here for preop  Left shoulder RTC repair  Ongoing for months, worsened recently  Right RTC repair 2015  No probs with anesthesia/bleeding/clotting/cp/sob  No nsaids  Just tylenol  Takes mvi and extra D which he will stop (vit D)        The following portions of the patient's history were reviewed and updated as appropriate: allergies, current medications, past family history, past medical history, past social history, past surgical history and problem list     Review of Systems   Respiratory: Negative for chest tightness and shortness of breath  Current Outpatient Prescriptions   Medication Sig Dispense Refill    hydroxychloroquine (PLAQUENIL) 200 mg tablet Take 200 mg by mouth      Acetaminophen (TYLENOL) 325 MG CAPS Take by mouth      hydroxychloroquine (PLAQUENIL) 200 mg tablet Take 1 tablet by mouth Twice daily       No current facility-administered medications for this visit  Objective:    /80   Pulse 72   Temp (!) 96 6 °F (35 9 °C)   Resp 18   Ht 5' 7" (1 702 m)   Wt 70 8 kg (156 lb)   BMI 24 43 kg/m²        Physical Exam   Constitutional: He appears well-developed  No distress  HENT:   Head: Normocephalic  Eyes: Conjunctivae are normal    Neck: Neck supple  Cardiovascular: Normal rate and intact distal pulses  Pulmonary/Chest: Effort normal  No respiratory distress  Abdominal: Soft  Musculoskeletal: He exhibits no edema or deformity  Generalized muscle atrophy   Neurological: He is alert  Skin: Skin is warm and dry  Psychiatric: His behavior is normal  Thought content normal    Nursing note and vitals reviewed               Pato Levine DO

## 2018-02-24 ENCOUNTER — HOSPITAL ENCOUNTER (OUTPATIENT)
Dept: RADIOLOGY | Facility: HOSPITAL | Age: 58
Discharge: HOME | End: 2018-02-24
Attending: FAMILY MEDICINE
Payer: COMMERCIAL

## 2018-02-24 DIAGNOSIS — M79.10 MYALGIA: ICD-10-CM

## 2018-02-24 PROCEDURE — 70553 MRI BRAIN STEM W/O & W/DYE: CPT

## 2018-02-24 PROCEDURE — A9585 GADOBUTROL INJECTION: HCPCS | Performed by: FAMILY MEDICINE

## 2018-02-24 RX ADMIN — GADOBUTROL 7 ML: 604.72 INJECTION INTRAVENOUS at 08:43

## 2018-02-26 ENCOUNTER — ANESTHESIA EVENT (OUTPATIENT)
Dept: PERIOP | Facility: HOSPITAL | Age: 58
End: 2018-02-26
Payer: COMMERCIAL

## 2018-02-26 RX ORDER — MULTIVITAMIN
1 TABLET ORAL DAILY
COMMUNITY
End: 2018-07-06

## 2018-02-26 RX ORDER — RIBOFLAVIN (VITAMIN B2) 100 MG
300 TABLET ORAL DAILY
COMMUNITY
End: 2018-06-26

## 2018-02-26 RX ORDER — MULTIVIT-MIN/IRON/FOLIC ACID/K 18-600-40
CAPSULE ORAL EVERY MORNING
COMMUNITY
End: 2018-04-22

## 2018-02-26 RX ORDER — SACCHAROMYCES BOULARDII 250 MG
250 CAPSULE ORAL EVERY MORNING
COMMUNITY
End: 2018-07-06

## 2018-02-26 RX ORDER — VITAMIN B COMPLEX
1 CAPSULE ORAL DAILY
COMMUNITY
End: 2018-07-06

## 2018-02-26 NOTE — PRE-PROCEDURE INSTRUCTIONS
My Surgical Experience    The following information was developed to assist you to prepare for your operation  What do I need to do before coming to the hospital?   Arrange for a responsible person to drive you to and from the hospital    Arrange care for your children at home  Children are not allowed in the recovery areas of the hospital   Plan to wear clothing that is easy to put on and take off  If you are having shoulder surgery, wear a shirt that buttons or zippers in the front  Bathing  o Shower the evening before and the morning of your surgery with an antibacterial soap  Please refer to the Pre Op Showering Instructions for Surgery Patients Sheet   o Remove nail polish and all body piercing jewelry  o Do not shave any body part for at least 24 hours before surgery-this includes face, arms, legs and upper body  Food  o Nothing to eat or drink after midnight the night before your surgery  This includes candy and chewing gum  o Exception: If your surgery is after 12:00pm (noon), you may have clear liquids such as 7-Up®, ginger ale, apple or cranberry juice, Jell-O®, water, or clear broth until 8:00 am  o Do not drink milk or juice with pulp on the morning before surgery  o Do not drink alcohol 24 hours before surgery  Medicine  o Follow instructions you received from your surgeon about which medicines you may take on the day of surgery  o If instructed to take medicine on the morning of surgery, take pills with just a small sip of water  Call your prescribing doctor for specific infroamtion on what to do if you take insulin    What should I bring to the hospital?    Bring:  No Campos or a walker, if you have them, for foot or knee surgery   A list of the daily medicines, vitamins, minerals, herbals and nutritional supplements you take   Include the dosages of medicines and the time you take them each day   Glasses, dentures or hearing aids   Minimal clothing; you will be wearing hospital sleepwear   Photo ID; required to verify your identity   If you have a Living Will or Power of , bring a copy of the documents   If you have an ostomy, bring an extra pouch and any supplies you use    Do not bring   Medicines or inhalers   Money, valuables or jewelry    What other information should I know about the day of surgery?  Notify your surgeons if you develop a cold, sore throat, cough, fever, rash or any other illness   Report to the Ambulatory Surgical/Same Day Surgery Unit   You will be instructed to stop at Registration only if you have not been pre-registered   Inform your  fi they do not stay that they will be asked by the staff to leave a phone number where they can be reached   Be available to be reached before surgery  In the event the operating room schedule changes, you may be asked to come in earlier or later than expected    *It is important to tell your doctor and others involved in your health care if you are taking or have been taking any non-prescription drugs, vitamins, minerals, herbals or other nutritional supplements  Any of these may interact with some food or medicines and cause a reaction      Pre-Surgery Instructions:   Medication Instructions    Acetaminophen (TYLENOL) 325 MG CAPS Instructed patient per Anesthesia Guidelines   Ascorbic Acid (VITAMIN C) 100 MG tablet Instructed patient per Anesthesia Guidelines   b complex vitamins capsule Instructed patient per Anesthesia Guidelines   Cholecalciferol (VITAMIN D) 2000 units CAPS Instructed patient per Anesthesia Guidelines   co-enzyme Q-10 30 MG capsule Instructed patient per Anesthesia Guidelines   hydroxychloroquine (PLAQUENIL) 200 mg tablet Instructed patient per Anesthesia Guidelines   milk thistle 175 MG tablet Instructed patient per Anesthesia Guidelines   Multiple Vitamin (MULTIVITAMIN) tablet Instructed patient per Anesthesia Guidelines      NON FORMULARY Instructed patient per Anesthesia Guidelines   saccharomyces boulardii (FLORASTOR) 250 mg capsule Instructed patient per Anesthesia Guidelines

## 2018-02-28 NOTE — ANESTHESIA PREPROCEDURE EVALUATION
Review of Systems/Medical History  Patient summary reviewed  Chart reviewed  No history of anesthetic complications     Cardiovascular   Pulmonary    Comment: Former smoker     GI/Hepatic            Endo/Other     GYN       Hematology   Musculoskeletal    Comment: Muscle atrophy  Myalgia          Neurology   Psychology           Physical Exam    Airway    Mallampati score: II  TM Distance: >3 FB  Neck ROM: full     Dental   No notable dental hx     Cardiovascular  Cardiovascular exam normal    Pulmonary  Pulmonary exam normal     Other Findings        Anesthesia Plan  ASA Score- 2     Anesthesia Type- regional and IV sedation with anesthesia with ASA Monitors  Additional Monitors:   Airway Plan:         Plan Factors-    Induction-     Postoperative Plan-     Informed Consent- Anesthetic plan and risks discussed with patient  I personally reviewed this patient with the CRNA  Discussed and agreed on the Anesthesia Plan with the CRNA  Ravinder Tracy

## 2018-03-01 ENCOUNTER — ANESTHESIA (OUTPATIENT)
Dept: PERIOP | Facility: HOSPITAL | Age: 58
End: 2018-03-01
Payer: COMMERCIAL

## 2018-03-01 ENCOUNTER — HOSPITAL ENCOUNTER (OUTPATIENT)
Facility: HOSPITAL | Age: 58
Setting detail: OUTPATIENT SURGERY
Discharge: HOME | End: 2018-03-01
Attending: ORTHOPAEDIC SURGERY | Admitting: ORTHOPAEDIC SURGERY
Payer: COMMERCIAL

## 2018-03-01 VITALS
RESPIRATION RATE: 18 BRPM | WEIGHT: 155 LBS | DIASTOLIC BLOOD PRESSURE: 84 MMHG | HEART RATE: 78 BPM | SYSTOLIC BLOOD PRESSURE: 124 MMHG | TEMPERATURE: 97.1 F | OXYGEN SATURATION: 96 % | BODY MASS INDEX: 24.28 KG/M2

## 2018-03-01 DIAGNOSIS — M75.102 TEAR OF LEFT ROTATOR CUFF, UNSPECIFIED TEAR EXTENT: ICD-10-CM

## 2018-03-01 PROCEDURE — 29826 SHO ARTHRS SRG DECOMPRESSION: CPT | Performed by: ORTHOPAEDIC SURGERY

## 2018-03-01 PROCEDURE — 29827 SHO ARTHRS SRG RT8TR CUF RPR: CPT | Performed by: PHYSICIAN ASSISTANT

## 2018-03-01 PROCEDURE — 29827 SHO ARTHRS SRG RT8TR CUF RPR: CPT | Performed by: ORTHOPAEDIC SURGERY

## 2018-03-01 PROCEDURE — C1713 ANCHOR/SCREW BN/BN,TIS/BN: HCPCS | Performed by: ORTHOPAEDIC SURGERY

## 2018-03-01 PROCEDURE — 29826 SHO ARTHRS SRG DECOMPRESSION: CPT | Performed by: PHYSICIAN ASSISTANT

## 2018-03-01 DEVICE — BIO-COMP SWVLK C, CLD 4.75X19.1MM
Type: IMPLANTABLE DEVICE | Status: FUNCTIONAL
Brand: ARTHREX®

## 2018-03-01 RX ORDER — ROPIVACAINE HYDROCHLORIDE 5 MG/ML
INJECTION, SOLUTION EPIDURAL; INFILTRATION; PERINEURAL AS NEEDED
Status: DISCONTINUED | OUTPATIENT
Start: 2018-03-01 | End: 2018-03-01 | Stop reason: SURG

## 2018-03-01 RX ORDER — FENTANYL CITRATE/PF 50 MCG/ML
25 SYRINGE (ML) INJECTION
Status: DISCONTINUED | OUTPATIENT
Start: 2018-03-01 | End: 2018-03-01 | Stop reason: HOSPADM

## 2018-03-01 RX ORDER — PROPOFOL 10 MG/ML
INJECTION, EMULSION INTRAVENOUS CONTINUOUS PRN
Status: DISCONTINUED | OUTPATIENT
Start: 2018-03-01 | End: 2018-03-01 | Stop reason: SURG

## 2018-03-01 RX ORDER — PROPOFOL 10 MG/ML
INJECTION, EMULSION INTRAVENOUS AS NEEDED
Status: DISCONTINUED | OUTPATIENT
Start: 2018-03-01 | End: 2018-03-01 | Stop reason: SURG

## 2018-03-01 RX ORDER — MIDAZOLAM HYDROCHLORIDE 1 MG/ML
INJECTION INTRAMUSCULAR; INTRAVENOUS AS NEEDED
Status: DISCONTINUED | OUTPATIENT
Start: 2018-03-01 | End: 2018-03-01 | Stop reason: SURG

## 2018-03-01 RX ORDER — SODIUM CHLORIDE, SODIUM LACTATE, POTASSIUM CHLORIDE, CALCIUM CHLORIDE 600; 310; 30; 20 MG/100ML; MG/100ML; MG/100ML; MG/100ML
75 INJECTION, SOLUTION INTRAVENOUS CONTINUOUS
Status: DISCONTINUED | OUTPATIENT
Start: 2018-03-01 | End: 2018-03-01 | Stop reason: HOSPADM

## 2018-03-01 RX ORDER — SODIUM CHLORIDE, SODIUM LACTATE, POTASSIUM CHLORIDE, CALCIUM CHLORIDE 600; 310; 30; 20 MG/100ML; MG/100ML; MG/100ML; MG/100ML
125 INJECTION, SOLUTION INTRAVENOUS CONTINUOUS
Status: DISCONTINUED | OUTPATIENT
Start: 2018-03-01 | End: 2018-03-01 | Stop reason: HOSPADM

## 2018-03-01 RX ADMIN — CEFAZOLIN SODIUM 2000 MG: 2 SOLUTION INTRAVENOUS at 08:24

## 2018-03-01 RX ADMIN — ROPIVACAINE HYDROCHLORIDE: 5 INJECTION, SOLUTION EPIDURAL; INFILTRATION; PERINEURAL at 10:57

## 2018-03-01 RX ADMIN — ROPIVACAINE HYDROCHLORIDE: 5 INJECTION, SOLUTION EPIDURAL; INFILTRATION; PERINEURAL at 10:55

## 2018-03-01 RX ADMIN — PROPOFOL 100 MCG/KG/MIN: 10 INJECTION, EMULSION INTRAVENOUS at 08:26

## 2018-03-01 RX ADMIN — SODIUM CHLORIDE, SODIUM LACTATE, POTASSIUM CHLORIDE, AND CALCIUM CHLORIDE 125 ML/HR: .6; .31; .03; .02 INJECTION, SOLUTION INTRAVENOUS at 07:24

## 2018-03-01 RX ADMIN — MIDAZOLAM HYDROCHLORIDE 2 MG: 1 INJECTION, SOLUTION INTRAMUSCULAR; INTRAVENOUS at 08:00

## 2018-03-01 RX ADMIN — ROPIVACAINE HYDROCHLORIDE 30 ML: 5 INJECTION, SOLUTION EPIDURAL; INFILTRATION; PERINEURAL at 08:00

## 2018-03-01 RX ADMIN — PROPOFOL 70 MG: 10 INJECTION, EMULSION INTRAVENOUS at 08:38

## 2018-03-01 NOTE — OP NOTE
OPERATIVE REPORT  PATIENT NAME: Kathleen Kohli    :  1960  MRN: [id number]  Pt Location: [facility] OR ROOM 01    SURGERY DATE: 3/1/2018    Surgeon(s) and Role:     * Ruby Munoz MD - Primary     * Finesse Matta PA-C - Assisting necessary for the procedure for assistance with minimally invasive techniques for rotator cuff repair and decompression and synovectomy    Preop Diagnosis:  Tear of left rotator cuff [M75 102]    Post-Op Diagnosis Codes:     * Tear of left rotator cuff [M75 102] and subacromial impingement and synovitis    Procedure(s) (LRB):  SHOULDER ARTHROSCOPY WITH ROTATOR CUFF REPAIR, SUBACROMIAL DECOMPRESSION, LIMITED SYNOVECTOMY (Left) using speed fix technique from [employer] with 1 anchor and 1 FiberTape and 1 fiber link and 1 FiberWire suture    Specimen(s):  * No specimens in log *    Estimated Blood Loss:   Minimal    Drains:       Anesthesia Type:   IV Sedation with regional Anesthesia    Operative Indications:  Tear of left rotator cuff [M75 102]  Radha Brooke is a 76-year-old male who has been suffering with left shoulder pain and weakness  He had an MRI demonstrating at least a partial-thickness high-grade rotator cuff tear  He had failed conservative measures with therapy and anti-inflammatories and ice and activity modification and wished to undergo a left shoulder arthroscopy with rotator cuff repair  He understood the risks and benefits of that procedure wished to go ahead  Risks are inclusive of but not limited to infection, stiffness, nerve injury causing numbness pain and weakness, failure of the repair, failure to regain full strength and ability, worsening of symptoms, and need for further surgery  Operative Findings:  Left shoulder with exam under anesthesia demonstrating forward flexion and abduction each to 160° with external rotation to 70° internal rotation is 60°    Intra-articular findings demonstrated dense synovitis in the glenohumeral joint that was excised with synovectomy  We demonstrated intact subscapularis tendon but also diffuse grade 2-3 changes and some regions grade 4 changes about the humeral head and glenoid  No loose bodies in axillary pouch  There was an anterior leading edge high-grade partial-thickness supraspinatus tendon tear adjacent to a defect in the greater tuberosity  We also found type 3 subacromial spur upon which we performed an acromioplasty for decompression  We did repair the cuff tear after completing that tear and demonstrated excellent appearance of the tendon back to bone  Complications:   None    Procedure and Technique:  Randi Mann was taken to the operating room and placed supine on the OR table  He was given preoperative IV antibiotics  He had regional block by Anesthesia and was taking comfortably and safely into the beach chair position with all parts well padded and the head in neutral position in the head brown  We then took the left shoulder through exam under anesthesia as described above  Left upper extremity was then prepped and draped in usual sterile fashion  We took a surgical time-out  We then created the posterior portal with 11 blade  Diagnostic arthroscopy was begun and an anterior portal was made in the rotator interval with an 11 blade  Diagnostic arthroscopy demonstrated significant synovitis and we did perform a limited synovectomy utilizing the Wand  We also demonstrated that the long head of biceps tendon had previously ruptured and was out of the shoulder itself  We did identify the stump on the superior labrum  We also identified a high-grade partial-thickness supraspinatus tendon tear along the anterior leading edge  We did maryuri that with a needle    We demonstrated significant arthritic change throughout the shoulder particularly adjacent to the rotator cuff tear with grade 4 changes there as well as along the posterior humeral head and grade 2-3 changes scattered throughout the glenoid and humeral head otherwise  We then went to the subacromial space and created a lateral portal with 11 blade  We demonstrated a type 2 subacromial spur upon which we performed acromioplasty utilizing a bur for decompression  We then completed the tear in supraspinatus tendon quite easily utilizing a shaver  We then debrided the greater tuberosity down to bleeding bone with a bur and did place the FiberTape suture in horizontal mattress fashion as well as a rip stop suture with a fiber link  We then placed all 3 of these sutures into an anchor into the greater tuberosity fixing the tear  We placed 1 additional suture which was a FiberWire suture from the anchor into the anterior portion of the tear and tied that down with arthroscopic knot tying techniques  We found the tendon repair to be excellent and did take final images and lavaged the space and removed the arthroscopic equipment  We closed the portals with 4-0 nylon suture  Dry, sterile dressings were applied with a sling  He tolerated procedure well and transferred to the recovery room stable condition  He will follow up in 1 week for suture removal   We will start physical therapy in 2 weeks  He will wear the sling for 6 weeks     I was present for the entire procedure and A qualified resident physician was not available    Patient Disposition:  PACU     SIGNATURE: Eufemia He MD  DATE: March 1, 2018  TIME: 9:38 AM

## 2018-03-01 NOTE — PERIOPERATIVE NURSING NOTE
OOB to bathroom, gait steady  Denies pain or discomfort  Left arm sling intact, good radial pulse, fingers warm and pink with brisk capillary refill

## 2018-03-01 NOTE — INTERVAL H&P NOTE
H&P reviewed  After examining the patient I find no changes in the patients condition since the H&P had been written  My evaluation of the patient also has not changed since his evaluation with me back in January  He is medically cleared from his primary care physician's standpoint  Also, he does continue to have weakness and pain in his left upper extremity and wishes to undergo left shoulder arthroscopy for rotator cuff repair

## 2018-03-01 NOTE — ANESTHESIA PROCEDURE NOTES
Peripheral Block    Patient location during procedure: pre-op  Start time: 3/1/2018 8:00 AM  Reason for block: at surgeon's request and post-op pain management  Staffing  Anesthesiologist: TORITO Poe  Preanesthetic Checklist  Completed: patient identified, site marked, surgical consent, pre-op evaluation, timeout performed, IV checked, risks and benefits discussed and monitors and equipment checked  Peripheral Block  Patient position: supine  Prep: ChloraPrep  Patient monitoring: heart rate, continuous pulse ox and frequent blood pressure checks  Block type: interscalene  Laterality: left  Injection technique: catheter and single-shot  Procedures: ultrasound guided  Ultrasound permanent image saved  Local infiltration: ropivacaine  Infiltration strength: 0 5 %  Dose: 25 mL  Needle  Needle type: Stimuplex   Needle gauge: 21 G  Needle length: 5 cm  Needle localization: ultrasound guidance  Catheter size: 18 G  Assessment  Injection assessment: incremental injection, local visualized surrounding nerve on ultrasound, negative aspiration for heme and no paresthesia on injection  Paresthesia pain: none  Heart rate change: no  Slow fractionated injection: no  Post-procedure:  adhesive bandage applied  patient tolerated the procedure well with no immediate complications

## 2018-03-01 NOTE — ANESTHESIA POSTPROCEDURE EVALUATION
Post-Op Assessment Note      CV Status:  Stable    Mental Status:  Alert and awake    Hydration Status:  Stable    PONV Controlled:  Controlled    Airway Patency:  Patent and adequate    Post Op Vitals Reviewed: Yes          Staff: CRNA     Post-op block assessment: catheter intact and no complications        BP      Temp      Pulse     Resp      SpO2

## 2018-03-01 NOTE — H&P (VIEW-ONLY)
Assessment/Plan:    Medically cleared for surgery     Diagnoses and all orders for this visit:    Tear of left rotator cuff, unspecified tear extent    Other orders  -     hydroxychloroquine (PLAQUENIL) 200 mg tablet; Take 1 tablet by mouth Twice daily  -     Acetaminophen (TYLENOL) 325 MG CAPS; Take by mouth  -     hydroxychloroquine (PLAQUENIL) 200 mg tablet; Take 200 mg by mouth        ekg today and normal      No Follow-up on file  Subjective:      Patient ID: Quita Cuba is a 61 y o  male  Chief Complaint   Patient presents with    Pre-op Exam     Should left rotator cuff reppair       Here for preop  Left shoulder RTC repair  Ongoing for months, worsened recently  Right RTC repair 2015  No probs with anesthesia/bleeding/clotting/cp/sob  No nsaids  Just tylenol  Takes mvi and extra D which he will stop (vit D)        The following portions of the patient's history were reviewed and updated as appropriate: allergies, current medications, past family history, past medical history, past social history, past surgical history and problem list     Review of Systems   Respiratory: Negative for chest tightness and shortness of breath  Current Outpatient Prescriptions   Medication Sig Dispense Refill    hydroxychloroquine (PLAQUENIL) 200 mg tablet Take 200 mg by mouth      Acetaminophen (TYLENOL) 325 MG CAPS Take by mouth      hydroxychloroquine (PLAQUENIL) 200 mg tablet Take 1 tablet by mouth Twice daily       No current facility-administered medications for this visit  Objective:    /80   Pulse 72   Temp (!) 96 6 °F (35 9 °C)   Resp 18   Ht 5' 7" (1 702 m)   Wt 70 8 kg (156 lb)   BMI 24 43 kg/m²        Physical Exam   Constitutional: He appears well-developed  No distress  HENT:   Head: Normocephalic  Eyes: Conjunctivae are normal    Neck: Neck supple  Cardiovascular: Normal rate and intact distal pulses  Pulmonary/Chest: Effort normal  No respiratory distress  Abdominal: Soft  Musculoskeletal: He exhibits no edema or deformity  Generalized muscle atrophy   Neurological: He is alert  Skin: Skin is warm and dry  Psychiatric: His behavior is normal  Thought content normal    Nursing note and vitals reviewed               Candelaria Davis DO

## 2018-03-01 NOTE — DISCHARGE INSTRUCTIONS
Instruction Sheet Following RTC repair     Sling:   Wear your sling at all times after your surgery (this includes sleeping), except for when you are doing pendulum exercises, showering, or physical therapy  Additionally, you should not carry anything heavier than a pencil in your hand  Dressing:   Remove all cotton and yellow gauze 48 hours after your surgery  You do not need to put a new dressing on your wound; place Band-Aids on your wound  Showering: You may shower 48 hours after surgery  Please use CAUTION!! Be careful not to slip and fall  The effects of anesthesia and/or medication may make you drowsy or light-headed  Do not soak in a bathtub, hot tub, or pool until the doctor tells you it is O K , to do so  Once you are done showering pat the wound dry and apply a Band-Aid  While in the shower you must keep the arm across the front of the body as if it were still in the sling  Sleeping:   You will most likely have difficulty sleeping in the first few weeks after surgery  Most people find it more comfortable to sleep in a reclining position  You can either sleep in a recliner chair or create this position with pillows  Ice:   You can ice the shoulder to reduce swelling and discomfort  Do not ice the shoulder more than 20 minutes at a time  Let the shoulder warm up before reapplication  Avoid getting you wound wet  If you have a Cryocuff you may keep this on continuously  Follow-up visit:   You need to see the doctor about one week following surgery for your first post-op visit  At that time your sutures (stitches) will be removed  You will be given a prescription to begin physical therapy if you were not already given one  Common concerns:   Bruising and/or swelling of the shoulder, arm, or hand are common after surgery  To relieve this discomfort it is best to ice the shoulder  Please call if:   1  Any oozing or redness of the wound, fevers (>101 3°F), or chills       2  Any difficulty breathing or heaviness in the chest      REMEMBER - these are only guidelines for what to expect  If you have any questions or concerns, please do not hesitate to call the office  [phone]

## 2018-03-12 ENCOUNTER — OFFICE VISIT (OUTPATIENT)
Dept: OBGYN CLINIC | Facility: CLINIC | Age: 58
End: 2018-03-12

## 2018-03-12 VITALS
WEIGHT: 156.8 LBS | SYSTOLIC BLOOD PRESSURE: 116 MMHG | HEIGHT: 68 IN | HEART RATE: 76 BPM | BODY MASS INDEX: 23.76 KG/M2 | DIASTOLIC BLOOD PRESSURE: 79 MMHG

## 2018-03-12 DIAGNOSIS — M75.122 COMPLETE TEAR OF LEFT ROTATOR CUFF: Primary | ICD-10-CM

## 2018-03-12 PROCEDURE — 99024 POSTOP FOLLOW-UP VISIT: CPT | Performed by: ORTHOPAEDIC SURGERY

## 2018-03-12 NOTE — PATIENT INSTRUCTIONS
Rotator Cuff Tear Repair   WHAT YOU NEED TO KNOW:   Rotator cuff repair is surgery to fix a tear in one or more of your rotator cuff tendons  A tendon is a cord of tough tissue that connects your muscles to your bones  The rotator cuff is made up of a group of muscles and tendons that hold the shoulder joint in place  DISCHARGE INSTRUCTIONS:   Medicines:   · Antibiotics: This medicine is given to fight or prevent an infection caused by bacteria  Always take your antibiotics exactly as ordered by your healthcare provider  Do not stop taking your medicine unless directed by your healthcare provider  Never save antibiotics or take leftover antibiotics that were given to you for another illness  · Pain medicine: You may be given medicine to take away or decrease pain  Do not wait until the pain is severe before you take your medicine  · NSAIDs , such as ibuprofen, help decrease swelling, pain, and fever  This medicine is available with or without a doctor's order  NSAIDs can cause stomach bleeding or kidney problems in certain people  If you take blood thinner medicine, always ask your healthcare provider if NSAIDs are safe for you  Always read the medicine label and follow directions  · Take your medicine as directed  Contact your healthcare provider if you think your medicine is not helping or if you have side effects  Tell him or her if you are allergic to any medicine  Keep a list of the medicines, vitamins, and herbs you take  Include the amounts, and when and why you take them  Bring the list or the pill bottles to follow-up visits  Carry your medicine list with you in case of an emergency  Follow up with your healthcare provider as directed: Ask when you should return to have your stitches taken out  Write down your questions so you remember to ask them during your visits  Care for your wound:  Keep your shoulder wound clean and dry  Ask how to care for the wound, and if you may get it wet     Ice your shoulder: Ice may decrease pain, swelling, and muscle spasms  Use an ice pack, or put crushed ice in a plastic bag  Cover it with a towel and place it on your shoulder for 15 to 20 minutes every hour or as directed  Use a sling: You may need to use an abduction immobilizer sling for 4 to 6 weeks after surgery  This sling stops your arm from moving  The pillow attached to the sling holds your arm away from your body  This position decreases pressure on your wound, and helps blood flow to the surgery area, which may help the wound heal    Physical therapy:   · Your physical therapy may begin soon after surgery  At first, a physical therapist will work with you to do safe exercises that will allow your rotator cuff to heal  Do not use your arm to lift anything  Do not use your arm to move around, push yourself up from lying down, or to change positions  After a few weeks, the therapist may suggest therapy in the pool  The water allows you to move your arm with very little stress on your shoulder  · Over 2 to 3 months, you will do exercises that include using your shoulder more  You will begin exercises such as raising and stretching your arm  Do only those exercises that you have been told to do, and only as often as you are told to do them  Over time, you will begin doing exercises that make your shoulder muscles stronger  After 4 to 6 months, you may be able to begin activities that require you to lift your arm overhead, such as tennis and other racquet sports  It may take up to a year to return to all of your regular daily activities after you have a rotator cuff tear repair  Contact your healthcare provider if:   · Your surgery area is swollen, red, or has pus coming from it  · You have chills, a cough, or feel weak and achy  · You have a fever  · You have stiffness or pain in your shoulder that is worse and making you stop your physical therapy  · You are vomiting      · Your skin is itchy, swollen, or has a rash  · You have questions or concerns about your condition or care  Seek care immediately or call 911 if:   · You suddenly have shortness of breath  · The stitches on your shoulder wound come apart  · You have new shoulder pain that does not go away, even after you take pain medicine  · You have sudden numbness or tingling down your arm  © 2017 [address] [employer] Information is for End User's use only and may not be sold, redistributed or otherwise used for commercial purposes  All illustrations and images included in CareNotes® are the copyrighted property of [employer]  or [address]  The above information is an  only  It is not intended as medical advice for individual conditions or treatments  Talk to your doctor, nurse or pharmacist before following any medical regimen to see if it is safe and effective for you

## 2018-03-12 NOTE — PROGRESS NOTES
H&P Exam - Orthopedics   Mariusz Vargas 61 y o  male MRN: [id number]  Unit/Bed#:  Encounter: [id number]    Assessment/Plan     Assessment:  Left Rotator Cuff Tear  Plan:  Carmen Maldonado is doing well, 11 days post left rotator cuff repair  I wrote him a note for physical therapy  He had no further questions and will follow up in 6 weeks  Scribe Attestation    I,:   Zen Conner am acting as a scribe while in the presence of the attending physician :        I,:   Sebastien Mix MD personally performed the services described in this documentation    as scribed in my presence :            History of Present Illness   HPI:  Mariusz Vargas is a 61 y o  male who presents here today 11 day post left rotator cuff repair  He reports no issues and denies any numbness or tingling in his left arm  He shows no signs of infection  He has been complainant with using his sling  He states that he is pain free  Review of Systems   Constitutional: Negative  HENT: Negative  Eyes: Negative  Respiratory: Negative  Cardiovascular: Negative  Gastrointestinal: Negative  Endocrine: Negative  Genitourinary: Negative  Musculoskeletal: Negative  As noted in HPI   Skin: Negative  Allergic/Immunologic: Negative  Neurological: Negative  Hematological: Negative  Psychiatric/Behavioral: Negative          Historical Information   Past Medical History:   Diagnosis Date    Leg pain     bilateral leg pain (chronic)    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    Neoplasm of skin     lasr assessed 5/14/13, 5/25/15     Past Surgical History:   Procedure Laterality Date    ROTATOR CUFF REPAIR      ROTATOR CUFF REPAIR Left 3/1/2018    Procedure: SHOULDER ARTHROSCOPY WITH ROTATOR CUFF REPAIR, SUBACROMIAL DECOMPRESSION, LIMITED SYNOVECTOMY;  Surgeon: Sebastien Mix MD;  Location: [address] [place] [address];  Service: Orthopedics    SHOULDER ARTHROSCOPY      2015    TONSILLECTOMY       Social History   History   Alcohol Use    Yes Comment: social     History   Drug Use No     History   Smoking Status    Former Smoker    Types: Cigarettes    Quit date: 2/26/1986   Smokeless Tobacco    Never Used     Family History:   Family History   Problem Relation Age of Onset    Hypertension Mother     Diabetes Father     Diabetes Other        Meds/Allergies   PTA meds:   Cannot display prior to admission medications because the patient has not been admitted in this contact  Allergies   Allergen Reactions    Azithromycin Rash       Objective   Vitals: Blood pressure 116/79, pulse 76, height 5' 8" (1 727 m), weight 71 1 kg (156 lb 12 8 oz)  ,Body mass index is 23 84 kg/m²  [unfilled]    [unfilled]    Invasive Devices     Epidural Line            Nerve Block Catheter 03/01/18 10 days                Physical Exam   Constitutional: He appears well-developed  HENT:   Head: Normocephalic  Eyes: Pupils are equal, round, and reactive to light  Neck: Normal range of motion  Musculoskeletal: Normal range of motion  Neurological: He is alert  Ortho Exam    Lab Results: CBC: No results found for: WBC, HGB, HCT, MCV, PLT, ADJUSTEDWBC, MCH, MCHC, RDW, MPV, NRBC  Imaging: I have personally reviewed pertinent reports  EKG, Pathology, and Other Studies: I have personally reviewed pertinent reports        Code Status: [unfilled]  Advance Directive and Living Will:      Power of :    POLST:

## 2018-03-29 ENCOUNTER — EVALUATION (OUTPATIENT)
Dept: PHYSICAL THERAPY | Facility: CLINIC | Age: 58
End: 2018-03-29
Payer: COMMERCIAL

## 2018-03-29 DIAGNOSIS — M75.122 COMPLETE TEAR OF LEFT ROTATOR CUFF: Primary | ICD-10-CM

## 2018-03-29 PROCEDURE — 97162 PT EVAL MOD COMPLEX 30 MIN: CPT | Performed by: PHYSICAL THERAPIST

## 2018-03-29 PROCEDURE — G8985 CARRY GOAL STATUS: HCPCS | Performed by: PHYSICAL THERAPIST

## 2018-03-29 PROCEDURE — G8984 CARRY CURRENT STATUS: HCPCS | Performed by: PHYSICAL THERAPIST

## 2018-03-29 NOTE — PROGRESS NOTES
PT Evaluation     Today's date: 3/29/2018  Patient name: Blanca Ibarra  : 1960  MRN: [id number]  Referring provider: Alfredo Mccall MD  Dx:   Encounter Diagnosis     ICD-10-CM    1  Complete tear of left rotator cuff M75 122 Ambulatory referral to Physical Therapy       Start Time: 425  Stop Time: 0500  Total time in clinic (min): 35 minutes    Assessment  Impairments: abnormal muscle firing, abnormal muscle tone, abnormal or restricted ROM, activity intolerance, impaired physical strength, lacks appropriate home exercise program, pain with function and scapular dyskinesis    Assessment details: Blanca Ibarra is a 61 y o  male who presents to physical therapy with pain, decreased UE range of motion, decreased UE strength, impaired function, decreased activity tolerance and fair posture  Patient's clinical presentation is consistent with their referring diagnosis of Complete tear of left rotator cuff  (primary encounter diagnosis)  The pt presents with functional limitations of ADLs, work-related activities, performing household chores and reaching  Pt would benefit from physical therapy services to address these limitations and maximize function  Pt was instructed and educated on post-op contraindications/precautions today and demonstrates understanding  Understanding of Dx/Px/POC: good   Prognosis: good    Goals  Short term goals:  (4 weeks)  1  Patient will have pain level 2/10 left shoulder at rest  2  Patient will report a 50% improvement in symptoms with ADL's  4  Patient will have improved Left shoulder ROM by 30 degrees    Long term goals: (8 weeks)  1  Patient will report 85 % improvement improvement in symptoms with ADL's   2  Patient will demonstrate appropriate scapulohumeral rhythm with reaching overhead  3   Patient will be independent in a comprehensive home exercise program      Plan  Patient would benefit from: PT eval and skilled PT  Planned modality interventions: cryotherapy and thermotherapy: hydrocollator packs  Planned therapy interventions: joint mobilization, massage, neuromuscular re-education, patient education, postural training, stretching, therapeutic exercise, home exercise program and functional ROM exercises  Other planned therapy interventions: Strengthening will be added into treatment when permitted as per protocol  Frequency: 2x week  Duration in visits: 16  Duration in weeks: 8  Treatment plan discussed with: patient        Subjective Evaluation    History of Present Illness  Date of surgery: 3/2/2018  Mechanism of injury: Insidious onset Left shld pain  months ago  He followed up with Dr Zackery Bravo and an MRI was performed  It indicated RC tear  He underwent Left RC repair 3/02/18   He has now been referred to PT   Pain  Current pain ratin  At best pain ratin  At worst pain ratin  Quality: tight and pulling  Relieving factors: rest    Social Support    Employment status: working ([employer] for [address])  Hand dominance: right    Patient Goals  Patient's goals regarding treatment: Lift left arm overhead and sleep on his side again  Objective     Postural Observations  Seated posture: fair    Additional Postural Observation Details  He did not come in with sling today  He uses it episodally as needed    Observations     Additional Observation Details  Surgical portals have healed well at this time    Palpation   Left   Hypertonic in the infraspinatus, levator scapulae and upper trapezius       Passive Range of Motion   Left Shoulder   Flexion: 84 degrees   External rotation 0°: 17 degrees   Internal rotation 0°: 60 degrees     Right Shoulder   Flexion: 165 degrees   Abduction: 165 degrees   External rotation 0°: 85 degrees   Internal rotation 0°: 85 degrees     Scapular Mobility   Left Shoulder   Scapular mobility: fair    Strength/Myotome Testing     Right Shoulder     Planes of Motion   Flexion: 4+   Abduction: 4+   External rotation at 0°: 4+   Internal rotation at 0°: 4+     Additional Strength Details  Left shld not assessed due to surgical precautions      Flowsheet Rows    Flowsheet Row Most Recent Value   PT/OT G-Codes   Current Score  47   Projected Score  67   FOTO information reviewed  Yes   Assessment Type  Evaluation   G code set  Carrying, Moving & Handling Objects   Carrying, Moving and Handling Objects Current Status ()  CK   Carrying, Moving and Handling Objects Goal Status ()  CJ          Precautions: - weakness bilateral legs , unexplained weight loss (15 lbs in the last year)    Specialty Daily Treatment Diary     Manual  3/29/18       ST mobs        PROM as per protocol        STM to UT , levator scap                            Exercise Diary         Elbow flex /ext AROM        Gripping        Pendulums        Scap squeeze                                                                                                                                            Modalities        MH  pre        CP  post        Visit #  1

## 2018-04-03 ENCOUNTER — OFFICE VISIT (OUTPATIENT)
Dept: PHYSICAL THERAPY | Facility: CLINIC | Age: 58
End: 2018-04-03
Payer: COMMERCIAL

## 2018-04-03 DIAGNOSIS — M75.122 COMPLETE TEAR OF LEFT ROTATOR CUFF: Primary | ICD-10-CM

## 2018-04-03 PROCEDURE — 97110 THERAPEUTIC EXERCISES: CPT

## 2018-04-03 PROCEDURE — 97140 MANUAL THERAPY 1/> REGIONS: CPT

## 2018-04-03 NOTE — PROGRESS NOTES
Daily Note     Today's date: 4/3/2018  Patient name: Jessenia Shields  : 1960  MRN: [id number]  Referring provider: Redd Nunez MD  Dx:   Encounter Diagnosis     ICD-10-CM    1  Complete tear of left rotator cuff M75 122                   Subjective: Pt denies pain left shldr at rest       Objective: See treatment diary below      Precautions: - weakness bilateral legs , unexplained weight loss (15 lbs in the last year)    Specialty Daily Treatment Diary     Manual  3/29/18 4-3      ST mobs        PROM as per protocol  X      STM to UT , levator scap  X 10 min                 15 min           Exercise Diary   4-3-18       Elbow flex /ext AROM  X 30       Gripping  X 30 yellow sponge       Pendulums  X 20 ea       Scap squeeze  3 x10                                                                                                                                           Modalities        MH  pre  No       CP  post  X 10 min       Visit #  1 2                Assessment: Tolerated treatment well  Patient would benefit from continued PT ;pt with palpable tightness L UT/LS; pt with good tolerance to passive stretch into flexion & ER @ neutral abduction       Plan: Continue per plan of care   Continue per RTC repair protocol

## 2018-04-05 ENCOUNTER — OFFICE VISIT (OUTPATIENT)
Dept: PHYSICAL THERAPY | Facility: CLINIC | Age: 58
End: 2018-04-05
Payer: COMMERCIAL

## 2018-04-05 DIAGNOSIS — M75.122 COMPLETE TEAR OF LEFT ROTATOR CUFF: Primary | ICD-10-CM

## 2018-04-05 DIAGNOSIS — M75.102 TEAR OF LEFT ROTATOR CUFF, UNSPECIFIED TEAR EXTENT: ICD-10-CM

## 2018-04-05 PROCEDURE — 97140 MANUAL THERAPY 1/> REGIONS: CPT

## 2018-04-05 PROCEDURE — 97110 THERAPEUTIC EXERCISES: CPT

## 2018-04-05 NOTE — PROGRESS NOTES
Daily Note     Today's date: 2018  Patient name: Matthew Velazquez  : 1960  MRN: [id number]  Referring provider: Zackary Muñoz MD  Dx:   No diagnosis found  Subjective: Pt denies pain left shldr arrived in therapy without sling stating that he left it in the car  Objective: See treatment diary below      Precautions: - weakness bilateral legs , unexplained weight loss (15 lbs in the last year)    Specialty Daily Treatment Diary     Manual  3/29/18 4-3 4/5/18     ST mobs        PROM as per protocol  X 5 min     STM to UT , levator scap  X 10 min  10 min               15 min           Exercise Diary   4-3-18     Elbow flex /ext AROM  X 30  30     Gripping  X 30 yellow sponge  30      Pendulums  X 20 ea  20     Scap squeeze  3 x10  30     Wand flex   10                                                                                                                                 Modalities   18     MH  pre  No  ---     CP  post  X 10 min  10 min     Visit #  1 2 3                Assessment: Tolerated treatment well  No difficulty with wand flexion in sup  Explained to pt about protocol and wearing of sling tightness through uot Patient would benefit from continued PT      Plan: Continue per plan of care   Continue per RTC repair protocol  Continue week 5

## 2018-04-10 ENCOUNTER — OFFICE VISIT (OUTPATIENT)
Dept: PHYSICAL THERAPY | Facility: CLINIC | Age: 58
End: 2018-04-10
Payer: COMMERCIAL

## 2018-04-10 DIAGNOSIS — M75.122 COMPLETE TEAR OF LEFT ROTATOR CUFF: Primary | ICD-10-CM

## 2018-04-10 DIAGNOSIS — M75.102 TEAR OF LEFT ROTATOR CUFF, UNSPECIFIED TEAR EXTENT: ICD-10-CM

## 2018-04-10 PROCEDURE — 97110 THERAPEUTIC EXERCISES: CPT

## 2018-04-10 PROCEDURE — 97140 MANUAL THERAPY 1/> REGIONS: CPT

## 2018-04-10 NOTE — PROGRESS NOTES
Daily Note     Today's date: 4/10/2018  Patient name: Albert Mast  : 1960  MRN: [id number]  Referring provider: Edgar Arriaga MD  Dx:   Encounter Diagnosis     ICD-10-CM    1  Complete tear of left rotator cuff M75 122    2  Tear of left rotator cuff, unspecified tear extent M75 102                   Subjective: Pt reports no pain left shldr arrived in therapy without sling again today      Objective: See treatment diary below      Precautions: - weakness bilateral legs , unexplained weight loss (15 lbs in the last year)    Specialty Daily Treatment Diary     Manual  3/29/18 4-3 4/5/18 4/10/18    ST mobs        PROM as per protocol  X 5 min 5 min    STM to UT , levator scap  X 10 min  10 min 10 min              15 min           Exercise Diary   4-3-18  4/5/18 4/10/18    Elbow flex /ext AROM  X 30  30 30    Gripping  X 30 yellow sponge  30  30 yellow    Pendulums  X 20 ea  20 20    Scap squeeze  3 x10  30     Wand flex   10 20    pulleys    10    Prone row                                                                                                                    Modalities   4/5/18 4/10/18    MH  pre  No  ---     CP  post  X 10 min  10 min 10 min    Visit #  1 2 3  4              Assessment: Tolerated treatment well  Passive flexion to apprx 110 deg ER to 20 deg Patient would benefit from continued PT      Plan: Continue per plan of care   Continue per RTC repair protocol  Continue week 5

## 2018-04-12 ENCOUNTER — APPOINTMENT (OUTPATIENT)
Dept: PHYSICAL THERAPY | Facility: CLINIC | Age: 58
End: 2018-04-12
Payer: COMMERCIAL

## 2018-04-16 ENCOUNTER — TELEPHONE (OUTPATIENT)
Dept: FAMILY MEDICINE CLINIC | Facility: CLINIC | Age: 58
End: 2018-04-16

## 2018-04-16 NOTE — TELEPHONE ENCOUNTER
DR Pollo Salvador   Patient has another surgery coming up  Wife stated he had a preop in Feb   Do you want to do another one?

## 2018-04-17 ENCOUNTER — OFFICE VISIT (OUTPATIENT)
Dept: PHYSICAL THERAPY | Facility: CLINIC | Age: 58
End: 2018-04-17
Payer: COMMERCIAL

## 2018-04-17 DIAGNOSIS — M75.122 COMPLETE TEAR OF LEFT ROTATOR CUFF: Primary | ICD-10-CM

## 2018-04-17 DIAGNOSIS — M75.102 TEAR OF LEFT ROTATOR CUFF, UNSPECIFIED TEAR EXTENT: ICD-10-CM

## 2018-04-17 PROCEDURE — 97110 THERAPEUTIC EXERCISES: CPT

## 2018-04-17 PROCEDURE — 97140 MANUAL THERAPY 1/> REGIONS: CPT

## 2018-04-17 NOTE — PROGRESS NOTES
Daily Note     Today's date: 2018  Patient name: Zetta Severin  : 1960  MRN: [id number]  Referring provider: Jeremiah Dinh MD  Dx:   Encounter Diagnosis     ICD-10-CM    1  Complete tear of left rotator cuff M75 122    2  Tear of left rotator cuff, unspecified tear extent M75 102                   Subjective: Pt reports no pain left shldr arrived in therapy without sling again today      Objective: See treatment diary below      Precautions: - weakness bilateral legs , unexplained weight loss (15 lbs in the last year)    Specialty Daily Treatment Diary  Surgery on 3/2/18    Manual  3/29/18 4-3 4/5/18 4/10/18 4/17/18   ST mobs     3 min   PROM as per protocol  X 5 min 5 min 4 min   STM to UT , levator scap  X 10 min  10 min 10 min 8 min             15 min           Exercise Diary   4-3-18  4/5/18 4/10/18 4/17/18   Elbow flex /ext AROM  X 30  30 30 30   Gripping  X 30 yellow sponge  30  30 yellow 30 blue   Pendulums  X 20 ea  20 20 2 min   Scap squeeze  3 x10  30  30   Wand flex   10 20 20   pulleys    10 20   Prone row     Bent X 20   Supine ER     10                                                                                                       Modalities   4/5/18 4/10/18 4/17/18   MH  pre  No  ---     CP  post  X 10 min  10 min 10 min 10 min   Visit #  1 2 3  4 5 FOTO done             Assessment: Tolerated treatment well  Pt has arrived for last 3 visits without sling spanding a 2 week period Have discussed importance of adhering to MD protocol in order to achieve optimum overall outcome  Patient would benefit from continued PT      Plan: Continue per plan of care   Continue per RTC repair protocol  Continue week 5

## 2018-04-19 ENCOUNTER — OFFICE VISIT (OUTPATIENT)
Dept: PHYSICAL THERAPY | Facility: CLINIC | Age: 58
End: 2018-04-19
Payer: COMMERCIAL

## 2018-04-19 DIAGNOSIS — M75.122 COMPLETE TEAR OF LEFT ROTATOR CUFF: Primary | ICD-10-CM

## 2018-04-19 PROCEDURE — 97140 MANUAL THERAPY 1/> REGIONS: CPT | Performed by: PHYSICAL THERAPIST

## 2018-04-19 PROCEDURE — 97110 THERAPEUTIC EXERCISES: CPT | Performed by: PHYSICAL THERAPIST

## 2018-04-19 NOTE — PROGRESS NOTES
Daily Note     Today's date: 2018  Patient name: Tesfaye Tse  : 1960  MRN: [id number]  Referring provider: Rene Neff MD  Dx:   Encounter Diagnosis     ICD-10-CM    1  Complete tear of left rotator cuff M75 122                   Subjective: Pt reports minimal soreness Left shld today  Objective: See treatment diary below      Precautions: - weakness bilateral legs , unexplained weight loss (15 lbs in the last year)    Specialty Daily Treatment Diary  Surgery on 3/2/18    Manual  18       ST mobs 3 min       PROM as per protocol 6 min       STM to UT , levator scap 7  min                           Exercise Diary           Elbow flex /ext AROM 30 x       Gripping 30 x  blue       Pendulums 3 min       Scap squeeze 30 x       Wand flex 20 x       pulleys 20 x       Prone row 20 x       Supine ER 10 x                                                                                                           Modalities        MH  pre 5 min       CP  post 5 min       Visit #  6                 Assessment:  Pain during descend from flexion PROM  ROM progressing nicely  Plan:  Continue PT as per protocol

## 2018-04-22 RX ORDER — MULTIVITAMIN WITH FOLIC ACID 400 MCG
1 TABLET ORAL
COMMUNITY
End: 2018-06-26

## 2018-04-22 RX ORDER — MELATONIN
1000
COMMUNITY
End: 2018-07-06

## 2018-04-22 RX ORDER — UBIDECARENONE 60 MG
1 CAPSULE ORAL
COMMUNITY
End: 2018-07-06

## 2018-04-23 ENCOUNTER — OFFICE VISIT (OUTPATIENT)
Dept: FAMILY MEDICINE CLINIC | Facility: CLINIC | Age: 58
End: 2018-04-23
Payer: COMMERCIAL

## 2018-04-23 ENCOUNTER — OFFICE VISIT (OUTPATIENT)
Dept: OBGYN CLINIC | Facility: CLINIC | Age: 58
End: 2018-04-23

## 2018-04-23 VITALS
WEIGHT: 148.8 LBS | DIASTOLIC BLOOD PRESSURE: 72 MMHG | HEIGHT: 68 IN | HEART RATE: 85 BPM | SYSTOLIC BLOOD PRESSURE: 110 MMHG | BODY MASS INDEX: 22.55 KG/M2

## 2018-04-23 VITALS
HEART RATE: 72 BPM | HEIGHT: 68 IN | SYSTOLIC BLOOD PRESSURE: 110 MMHG | WEIGHT: 149 LBS | RESPIRATION RATE: 18 BRPM | TEMPERATURE: 97.6 F | BODY MASS INDEX: 22.58 KG/M2 | DIASTOLIC BLOOD PRESSURE: 72 MMHG

## 2018-04-23 DIAGNOSIS — Z01.818 PREOPERATIVE CLEARANCE: Primary | ICD-10-CM

## 2018-04-23 DIAGNOSIS — M62.50 MUSCULAR ATROPHY, UNSPECIFIED SITE: ICD-10-CM

## 2018-04-23 DIAGNOSIS — M75.122 COMPLETE TEAR OF LEFT ROTATOR CUFF: Primary | ICD-10-CM

## 2018-04-23 DIAGNOSIS — M79.10 MYALGIA: ICD-10-CM

## 2018-04-23 PROCEDURE — 99243 OFF/OP CNSLTJ NEW/EST LOW 30: CPT | Performed by: FAMILY MEDICINE

## 2018-04-23 PROCEDURE — 99024 POSTOP FOLLOW-UP VISIT: CPT | Performed by: ORTHOPAEDIC SURGERY

## 2018-04-23 NOTE — PROGRESS NOTES
Assessment/Plan:  Left Rotator Cuff Repair  Scribe Attestation    I,:   Abril Pate am acting as a scribe while in the presence of the attending physician :        I,:   Kemal Hogan MD personally performed the services described in this documentation    as scribed in my presence :            Darrian Bañuelos is progressing well 7 weeks post left rotator cuff repair  I wrote a new script for physical therapy  He will follow up in 6 weeks  He understood and had no further questions  Subjective:   Dyan Horton is a 61 y o  male who presents here today 7 weeks post left rotator cuff repair  He reports today that he has no pain  He has been going to physical therapy and reports that his range of motion is improving  He denies any numbness or tingling in his left arm  Review of Systems   Constitutional: Negative  HENT: Negative  Eyes: Negative  Respiratory: Negative  Endocrine: Negative  Genitourinary: Negative  Musculoskeletal: Negative  As noted in HPI   Skin: Negative  Allergic/Immunologic: Negative  Neurological: Negative  Hematological: Negative  Psychiatric/Behavioral: Negative  All other systems reviewed and are negative  Past Medical History:   Diagnosis Date    Leg pain     bilateral leg pain (chronic)    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    Neoplasm of skin     lasr assessed 5/14/13, 5/25/15       Past Surgical History:   Procedure Laterality Date    ROTATOR CUFF REPAIR      ROTATOR CUFF REPAIR Left 3/1/2018    Procedure: SHOULDER ARTHROSCOPY WITH ROTATOR CUFF REPAIR, SUBACROMIAL DECOMPRESSION, LIMITED SYNOVECTOMY;  Surgeon: Kemal Hogan MD;  Location: [facility];  Service: Orthopedics    SHOULDER ARTHROSCOPY      2015    TONSILLECTOMY         Family History   Problem Relation Age of Onset    Hypertension Mother     Diabetes Father     Diabetes Other        Social History     Occupational History    Not on file       Social History Main Topics    Smoking status: Former Smoker     Types: Cigarettes     Quit date: 2/26/1986    Smokeless tobacco: Never Used    Alcohol use Yes      Comment: social    Drug use: No    Sexual activity: Not on file         Current Outpatient Prescriptions:     Acetaminophen (TYLENOL) 325 MG CAPS, Take by mouth, Disp: , Rfl:     Ascorbic Acid (VITAMIN C) 100 MG tablet, Take 300 mg by mouth daily, Disp: , Rfl:     b complex vitamins capsule, Take 1 capsule by mouth daily, Disp: , Rfl:     cholecalciferol (VITAMIN D3) 1,000 units tablet, Take 1,000 Units by mouth, Disp: , Rfl:     Coenzyme Q10 60 MG CAPS, Take 1 tablet by mouth, Disp: , Rfl:     hydroxychloroquine (PLAQUENIL) 200 mg tablet, Take 1 tablet by mouth Twice daily, Disp: , Rfl:     MAGNESIUM PO, Take 1 tablet by mouth, Disp: , Rfl:     milk thistle 175 MG tablet, Take 175 mg by mouth daily, Disp: , Rfl:     Multiple Vitamin (MULTIVITAMIN) tablet, Take 1 tablet by mouth daily, Disp: , Rfl:     Multiple Vitamin (TAB-A-COLLEEN) TABS, Take 1 tablet by mouth, Disp: , Rfl:     NON FORMULARY, Marine collagen peptides ; liver anti oxidents 1 tab a m , Disp: , Rfl:     PROBIOTIC PRODUCT PO, Take 1 tablet by mouth, Disp: , Rfl:     saccharomyces boulardii (FLORASTOR) 250 mg capsule, Take 250 mg by mouth every morning, Disp: , Rfl:     Allergies   Allergen Reactions    Azithromycin Rash and Hives       Objective:  Vitals:    04/23/18 0800   BP: 110/72   Pulse: 85       Left Shoulder Exam     Tenderness   The patient is experiencing no tenderness  Comments:  Special Tests deferred, range of motion is as expected post left rotator cuff repair  Physical Exam   Constitutional: He appears well-developed  HENT:   Head: Normocephalic  Eyes: Pupils are equal, round, and reactive to light  Neck: Normal range of motion  Musculoskeletal: Normal range of motion  Neurological: He is alert

## 2018-04-23 NOTE — PROGRESS NOTES
Assessment/Plan:    No problem-specific Assessment & Plan notes found for this encounter  Will offer flexeril in future when tests done for back stiffness/pain     Diagnoses and all orders for this visit:    Preoperative clearance    Muscular atrophy, unspecified site    Myalgia    Other orders  -     Coenzyme Q10 60 MG CAPS; Take 1 tablet by mouth  -     MAGNESIUM PO; Take 1 tablet by mouth  -     PROBIOTIC PRODUCT PO; Take 1 tablet by mouth  -     Multiple Vitamin (TAB-A-COLLEEN) TABS; Take 1 tablet by mouth  -     cholecalciferol (VITAMIN D3) 1,000 units tablet; Take 1,000 Units by mouth              No Follow-up on file  Subjective:      Patient ID: Leodan Rod is a 61 y o  male      Chief Complaint   Patient presents with    Pre-op Exam     having muscle BX Dr Yaima Robbins lpn       [facility]    Planned procedure: muscle bx  Surgeon: Dr Barbara Jackson  Date: 5/8/18  Anesthesia type: unknown    Prior major surgeries:  RTC b/l  Problems with anesthesia in past:  none    Can walk 4 blocks or 2 flights of stairs:  yes  History of excessive bleeding:  yes  History of excessive clotting:  no  Personal history of DVT or Pe:  no    Taking NSAIDS:  no  Takings vitamins D or E or A or fish oil supplements:  stopped    Usual am medications:   No rx    The following portions of the patient's history were reviewed and updated as appropriate: allergies, current medications, past family history, past medical history, past social history, past surgical history and problem list     Review of Systems      Current Outpatient Prescriptions   Medication Sig Dispense Refill    Acetaminophen (TYLENOL) 325 MG CAPS Take by mouth      Ascorbic Acid (VITAMIN C) 100 MG tablet Take 300 mg by mouth daily      b complex vitamins capsule Take 1 capsule by mouth daily      cholecalciferol (VITAMIN D3) 1,000 units tablet Take 1,000 Units by mouth      Coenzyme Q10 60 MG CAPS Take 1 tablet by mouth      hydroxychloroquine (PLAQUENIL) 200 mg tablet Take 1 tablet by mouth Twice daily      MAGNESIUM PO Take 1 tablet by mouth      milk thistle 175 MG tablet Take 175 mg by mouth daily      Multiple Vitamin (MULTIVITAMIN) tablet Take 1 tablet by mouth daily      Multiple Vitamin (TAB-A-COLLEEN) TABS Take 1 tablet by mouth      NON FORMULARY Marine collagen peptides ; liver anti oxidents 1 tab a m       PROBIOTIC PRODUCT PO Take 1 tablet by mouth      saccharomyces boulardii (FLORASTOR) 250 mg capsule Take 250 mg by mouth every morning       No current facility-administered medications for this visit          Objective:    /72   Pulse 72   Temp 97 6 °F (36 4 °C)   Resp 18   Ht 5' 8" (1 727 m)   Wt 67 6 kg (149 lb)   BMI 22 66 kg/m²        Physical Exam           Gatito Varma DO

## 2018-04-24 ENCOUNTER — OFFICE VISIT (OUTPATIENT)
Dept: PHYSICAL THERAPY | Facility: CLINIC | Age: 58
End: 2018-04-24
Payer: COMMERCIAL

## 2018-04-24 DIAGNOSIS — M75.122 COMPLETE TEAR OF LEFT ROTATOR CUFF: Primary | ICD-10-CM

## 2018-04-24 DIAGNOSIS — M75.102 TEAR OF LEFT ROTATOR CUFF, UNSPECIFIED TEAR EXTENT: ICD-10-CM

## 2018-04-24 PROCEDURE — 97110 THERAPEUTIC EXERCISES: CPT

## 2018-04-24 PROCEDURE — 97140 MANUAL THERAPY 1/> REGIONS: CPT

## 2018-04-24 NOTE — PROGRESS NOTES
Daily Note     Today's date: 2018  Patient name: Alistair Carrizales  : 1960  MRN: [id number]  Referring provider: Víctor Butler MD  Dx:   Encounter Diagnosis     ICD-10-CM    1  Complete tear of left rotator cuff M75 122    2  Tear of left rotator cuff, unspecified tear extent M75 102                   Subjective: Pt reports no Left shld pain today  Objective: See treatment diary below      Precautions: - weakness bilateral legs , unexplained weight loss (15 lbs in the last year)    Specialty Daily Treatment Diary  Surgery on 3/2/18    Manual  18      ST mobs 3 min 3 min      PROM as per protocol 6 min 4 min      STM to UT , levator scap 7  min 8 min                           Exercise Diary     18      Elbow flex /ext AROM 30 x 30 X 2 #      Gripping 30 x  blue       Pendulums 3 min 3 min      Scap squeeze 30 x 30      Wand flex 20 x 20      pulleys 20 x 30      Prone row 20 x 30      Supine ER 10 x 20      wipers  20                                                                                                  Modalities  18      MH  pre 5 min 10 min      CP  post 5 min       Visit #  6 7                Assessment:   ROM progressing nicely  Pt went to MD yesterday and stated that doctor was happy with progress      Plan:  Continue PT as per protocol    Continue week 7

## 2018-04-26 ENCOUNTER — OFFICE VISIT (OUTPATIENT)
Dept: PHYSICAL THERAPY | Facility: CLINIC | Age: 58
End: 2018-04-26
Payer: COMMERCIAL

## 2018-04-26 DIAGNOSIS — M75.122 COMPLETE TEAR OF LEFT ROTATOR CUFF: Primary | ICD-10-CM

## 2018-04-26 PROCEDURE — G8984 CARRY CURRENT STATUS: HCPCS | Performed by: PHYSICAL THERAPIST

## 2018-04-26 PROCEDURE — 97110 THERAPEUTIC EXERCISES: CPT | Performed by: PHYSICAL THERAPIST

## 2018-04-26 PROCEDURE — 97140 MANUAL THERAPY 1/> REGIONS: CPT | Performed by: PHYSICAL THERAPIST

## 2018-04-26 PROCEDURE — G8985 CARRY GOAL STATUS: HCPCS | Performed by: PHYSICAL THERAPIST

## 2018-04-26 NOTE — PROGRESS NOTES
Daily Note     Today's date: 2018  Patient name: Matthew Velazquez  : 1960  MRN: [id number]  Referring provider: Zackary Muñoz MD  Dx:   Encounter Diagnosis     ICD-10-CM    1  Complete tear of left rotator cuff M75 122                   Subjective: Pt reports no Left shld pain today  Objective: See treatment diary below      Precautions: - weakness bilateral legs , unexplained weight loss (15 lbs in the last year)    Specialty Daily Treatment Diary  Surgery on 3/2/18    Manual  18     ST mobs 3 min 3 min 2 min     PROM as per protocol 6 min 4 min 6 min     STM to UT , levator scap 7  min 8 min  8 min                         Exercise Diary     18      Elbow flex /ext AROM 30 x 30 X 2 # 30x   2#     Gripping 30 x  blue  50 x       Pendulums 3 min 3 min 4 m in     Scap squeeze 30 x 30 30x     Wand flex 20 x 20 20x     pulleys 20 x 30 30x     Prone row 20 x 30 Bent row  30x     Supine ER 10 x 20 20x     wipers  20      Ball roll   15x                                                                                         Modalities  18        pre 5 min 10 min 10 min     CP  post 5 min  5 min     Visit #  6 7 8               Assessment:   No significant pain during PROM ,he has tightness        Plan:  Continue PT

## 2018-05-01 ENCOUNTER — OFFICE VISIT (OUTPATIENT)
Dept: PHYSICAL THERAPY | Facility: CLINIC | Age: 58
End: 2018-05-01
Payer: COMMERCIAL

## 2018-05-01 DIAGNOSIS — M75.122 COMPLETE TEAR OF LEFT ROTATOR CUFF: Primary | ICD-10-CM

## 2018-05-01 DIAGNOSIS — M75.102 TEAR OF LEFT ROTATOR CUFF, UNSPECIFIED TEAR EXTENT: ICD-10-CM

## 2018-05-01 PROCEDURE — 97110 THERAPEUTIC EXERCISES: CPT

## 2018-05-01 PROCEDURE — 97140 MANUAL THERAPY 1/> REGIONS: CPT

## 2018-05-01 NOTE — PROGRESS NOTES
Daily Note     Today's date: 2018  Patient name: Albania De La Cruz  : 1960  MRN: [id number]  Referring provider: Tiara Covington MD  Dx:   Encounter Diagnosis     ICD-10-CM    1  Complete tear of left rotator cuff M75 122    2  Tear of left rotator cuff, unspecified tear extent M75 102                   Subjective: Pt reports no Left shld pain today  Objective: See treatment diary below      Precautions: - weakness bilateral legs , unexplained weight loss (15 lbs in the last year)    Specialty Daily Treatment Diary  Surgery on 3/2/18    Manual  18    ST mobs 3 min 3 min 2 min 2 min    PROM as per protocol 6 min 4 min 6 min 5 min    STM to UT , levator scap 7  min 8 min  8 min 8 min                        Exercise Diary     18    Elbow flex /ext AROM 30 x 30 X 2 # 30x   2# 30 X 2#    Gripping 30 x  blue  50 x       Pendulums 3 min 3 min 4 m in 4 min    Scap squeeze 30 x 30 30x 30    Wand flex 20 x 20 20x 20    pulleys 20 x 30 30x 30    Prone row 20 x 30 Bent row  30x 2 X 10    Supine ER 10 x 20 20x 20    wipers  20      Ball roll   15x 20    Prone Horz ABD    2 X 10    Prone Ext    2 X 10                                                                        Modalities  18    MH  pre 5 min 10 min 10 min 10 min    CP  post 5 min  5 min     Visit #  6 7 8 9              Assessment:   No significant pain during PROM ,he has tightness   ER normalizing well No difficulty with new activities      Plan:  Continue PT

## 2018-05-03 ENCOUNTER — APPOINTMENT (OUTPATIENT)
Dept: PHYSICAL THERAPY | Facility: CLINIC | Age: 58
End: 2018-05-03
Payer: COMMERCIAL

## 2018-05-15 ENCOUNTER — OFFICE VISIT (OUTPATIENT)
Dept: PHYSICAL THERAPY | Facility: CLINIC | Age: 58
End: 2018-05-15
Payer: COMMERCIAL

## 2018-05-15 DIAGNOSIS — M75.102 TEAR OF LEFT ROTATOR CUFF, UNSPECIFIED TEAR EXTENT: ICD-10-CM

## 2018-05-15 DIAGNOSIS — M75.122 COMPLETE TEAR OF LEFT ROTATOR CUFF: Primary | ICD-10-CM

## 2018-05-15 PROCEDURE — 97140 MANUAL THERAPY 1/> REGIONS: CPT

## 2018-05-15 PROCEDURE — 97110 THERAPEUTIC EXERCISES: CPT

## 2018-05-15 NOTE — PROGRESS NOTES
Daily Note     Today's date: 5/15/2018  Patient name: Mellisa Auguste  : 1960  MRN: [id number]  Referring provider: Radha Amaya MD  Dx:   Encounter Diagnosis     ICD-10-CM    1  Complete tear of left rotator cuff M75 122    2  Tear of left rotator cuff, unspecified tear extent M75 102                   Subjective: Pt reports no Left shld pain today  Objective: See treatment diary below      Precautions: - weakness bilateral legs , unexplained weight loss (15 lbs in the last year)    Specialty Daily Treatment Diary  Surgery on 3/2/18    Manual  4/19/18 4 /24/18 4/26/18 5/1/18 5/15/ 18   ST mobs 3 min 3 min 2 min 2 min 3 min   PROM as per protocol 6 min 4 min 6 min 5 min 4 min   STM to UT , levator scap 7  min 8 min  8 min 8 min 8 min                       Exercise Diary     4/24/18  5/1 5/15   Elbow flex /ext AROM 30 x 30 X 2 # 30x   2# 30 X 2# 30 x 3#   Gripping 30 x  blue  50 x       Pendulums 3 min 3 min 4 m in 4 min 4 min   Scap squeeze 30 x 30 30x 30 30   Wand flex 20 x 20 20x 20 20   pulleys 20 x 30 30x 30 30   Prone row 20 x 30 Bent row  30x 2 X 10 2 X 10 x 1#   Supine ER 10 x 20 20x 20 20 X 1#   Wand ER  20   20   Ball roll   15x 20 20   Prone Horz ABD    2 X 10 2 x 10 x 1#   Prone Ext    2 X 10 2 X 10 x 13   Lateral raises     10   full can     10                                                       Modalities  4/24/18  5/1 5/15   MH  pre 5 min 10 min 10 min 10 min 10 min   CP  post 5 min  5 min  10 min   Visit #  6 7 8 9 10 FOTO done             Assessment:   ROM normalizing well    No hiking noticed with Lateral raises      Plan:  Continue PT

## 2018-05-17 ENCOUNTER — OFFICE VISIT (OUTPATIENT)
Dept: PHYSICAL THERAPY | Facility: CLINIC | Age: 58
End: 2018-05-17
Payer: COMMERCIAL

## 2018-05-17 DIAGNOSIS — M75.102 TEAR OF LEFT ROTATOR CUFF, UNSPECIFIED TEAR EXTENT: ICD-10-CM

## 2018-05-17 DIAGNOSIS — M75.122 COMPLETE TEAR OF LEFT ROTATOR CUFF: Primary | ICD-10-CM

## 2018-05-17 PROCEDURE — 97110 THERAPEUTIC EXERCISES: CPT

## 2018-05-17 PROCEDURE — 97140 MANUAL THERAPY 1/> REGIONS: CPT

## 2018-05-17 NOTE — PROGRESS NOTES
Daily Note     Today's date: 2018  Patient name: Kevin Thacker  : 1960  MRN: [id number]  Referring provider: Luisana Mcwilliams MD  Dx:   Encounter Diagnosis     ICD-10-CM    1  Complete tear of left rotator cuff M75 122    2  Tear of left rotator cuff, unspecified tear extent M75 102                   Subjective: Pt reports no Left shld pain today  Objective: See treatment diary below      Precautions: - weakness bilateral legs , unexplained weight loss (15 lbs in the last year)    Specialty Daily Treatment Diary  Surgery on 3/2/18    Manual  18       ST mobs 3 min       PROM as per protocol 5 min       STM to UT , levator scap 7  min                           Exercise Diary         Elbow flex /ext AROM 30 x 3#       Gripping        Pendulums 4 min       Scap squeeze 30       Wand flex 20       pulleys 30       Prone row 2 x 10 x 1#       Supine ER 20        Wand ER 20       Ball roll 20       Prone Horz ABD 2 x 10 x 1#       Prone Ext 2 x 10 x 1#       Lateral raises 20       full can 20                                                           Modalities          pre 10 min       CP  post 10 min       Visit #  11                 Assessment:   ROM normalizing well    Displayed fatigue with lateral raises      Plan:  Continue PT

## 2018-05-22 ENCOUNTER — OFFICE VISIT (OUTPATIENT)
Dept: PHYSICAL THERAPY | Facility: CLINIC | Age: 58
End: 2018-05-22
Payer: COMMERCIAL

## 2018-05-22 DIAGNOSIS — M75.102 TEAR OF LEFT ROTATOR CUFF, UNSPECIFIED TEAR EXTENT: ICD-10-CM

## 2018-05-22 DIAGNOSIS — M75.122 COMPLETE TEAR OF LEFT ROTATOR CUFF: Primary | ICD-10-CM

## 2018-05-22 PROCEDURE — 97140 MANUAL THERAPY 1/> REGIONS: CPT

## 2018-05-22 PROCEDURE — 97110 THERAPEUTIC EXERCISES: CPT

## 2018-05-22 NOTE — PROGRESS NOTES
Daily Note     Today's date: 2018  Patient name: Christiano Ernandez  : 1960  MRN: [id number]  Referring provider: Geno Arellano MD  Dx:   Encounter Diagnosis     ICD-10-CM    1  Complete tear of left rotator cuff M75 122    2  Tear of left rotator cuff, unspecified tear extent M75 102                   Subjective: Pt reports no Left shld pain today  "I'm good to go"      Objective: See treatment diary below      Precautions: - weakness bilateral legs , unexplained weight loss (15 lbs in the last year)    Specialty Daily Treatment Diary  Surgery on 3/2/18    Manual  18      ST mobs 3 min 3 min      PROM as per protocol 5 min 4 min      STM to UT , levator scap 7  min 8 min                          Exercise Diary        Elbow flex /ext AROM 30 x 3# 30 x 4 #      Gripping        Pendulums 4 min 4 min      Scap squeeze 30 30      Wand flex 20 20      pulleys 30 30      Prone row 2 x 10 x 1# 2 X 10 X 2#      S/L  ER 20  20      Wand ER 20 20      Ball roll 20 20      Prone Horz ABD 2 x 10 x 1# 2 X 10 x 2#      Prone Ext 2 x 10 x 1# 2 X 10 x 2#      Lateral raises 20 20      full can 20 20                                                          Modalities       MH  pre 10 min 10 min      CP  post 10 min ----      Visit #  11 12                Assessment:   ROM normalizing well    Continues to perform well showing some fatigue at end of session      Plan:  Continue PT

## 2018-05-24 ENCOUNTER — OFFICE VISIT (OUTPATIENT)
Dept: PHYSICAL THERAPY | Facility: CLINIC | Age: 58
End: 2018-05-24
Payer: COMMERCIAL

## 2018-05-24 DIAGNOSIS — M75.122 COMPLETE TEAR OF LEFT ROTATOR CUFF: Primary | ICD-10-CM

## 2018-05-24 PROCEDURE — 97140 MANUAL THERAPY 1/> REGIONS: CPT | Performed by: PHYSICAL THERAPIST

## 2018-05-24 PROCEDURE — 97110 THERAPEUTIC EXERCISES: CPT | Performed by: PHYSICAL THERAPIST

## 2018-05-24 NOTE — PROGRESS NOTES
Daily Note     Today's date: 2018  Patient name: Darlene Seen  : 1960  MRN: [id number]  Referring provider: Dora Levine MD  Dx:   Encounter Diagnosis     ICD-10-CM    1  Complete tear of left rotator cuff M75 122                   Subjective: Pt reports no Left shld pain today but c/o LBP and neck pain  Objective: See treatment diary below      Precautions: - weakness bilateral legs , unexplained weight loss (15 lbs in the last year)    Specialty Daily Treatment Diary  Surgery on 3/2/18    Manual  18     ST mobs 3 min 3 min 3 min     PROM as per protocol 5 min 4 min 6 min     STM to UT , levator scap 7  min 8 min 5 min                         Exercise Diary        Elbow flex /ext AROM 30 x 3# 30 x 4 # 30x  5#     Gripping        Pendulums 4 min 4 min 4 min     Scap squeeze 30 30 30     Wand flex 20 20 20     pulleys 30 30 30x     Prone row 2 x 10 x 1# 2 X 10 X 2# 2 #   20x     S/L  ER 20  20 20     Wand ER 20 20 20     Ball roll 20 20 20     Prone Horz ABD 2 x 10 x 1# 2 X 10 x 2# 2 #   20x     Prone Ext 2 x 10 x 1# 2 X 10 x 2# 2 #   20x     Lateral raises 20 20 20     full can 20 20 20                                                         Modalities       MH  pre 10 min 10 min 10 min     CP  post 10 min ---- ---     Visit #  11 12 13               Assessment:   Completed full there ex  PROM Left shld was WNL for IR and ER and only end range limitations for flexion  He would benefit from continue focus on strengthening         Plan:  Continue PT

## 2018-05-29 ENCOUNTER — OFFICE VISIT (OUTPATIENT)
Dept: PHYSICAL THERAPY | Facility: CLINIC | Age: 58
End: 2018-05-29
Payer: COMMERCIAL

## 2018-05-29 DIAGNOSIS — M75.102 TEAR OF LEFT ROTATOR CUFF, UNSPECIFIED TEAR EXTENT: ICD-10-CM

## 2018-05-29 DIAGNOSIS — M75.122 COMPLETE TEAR OF LEFT ROTATOR CUFF: Primary | ICD-10-CM

## 2018-05-29 PROCEDURE — 97110 THERAPEUTIC EXERCISES: CPT

## 2018-05-29 PROCEDURE — 97140 MANUAL THERAPY 1/> REGIONS: CPT

## 2018-05-29 NOTE — PROGRESS NOTES
Daily Note     Today's date: 2018  Patient name: Chito Palacios  : 1960  MRN: [id number]  Referring provider: Dilcia Maharaj MD  Dx:   Encounter Diagnosis     ICD-10-CM    1  Complete tear of left rotator cuff M75 122    2  Tear of left rotator cuff, unspecified tear extent M75 102                   Subjective: Pt reports no Left shld pain today  Objective: See treatment diary below      Precautions: - weakness bilateral legs , unexplained weight loss (15 lbs in the last year)    Specialty Daily Treatment Diary  Surgery on 3/2/18    Manual  18    ST mobs 3 min 3 min 3 min 3 min    PROM as per protocol 5 min 4 min 6 min 6 min    STM to UT , levator scap 7  min 8 min 5 min 6 min                        Exercise Diary      Elbow flex /ext AROM 30 x 3# 30 x 4 # 30x  5# 30 x 5#    Gripping        Pendulums 4 min 4 min 4 min     Scap squeeze 30 30 30 30    Wand flex 20 20 20 20    pulleys 30 30 30x 30    Prone row 2 x 10 x 1# 2 X 10 X 2# 2 #   20x 20 x 2#    S/L  ER 20  20 20 20    Wand ER 20 20 20 20    Ball roll 20 20 20 20    Prone Horz ABD 2 x 10 x 1# 2 X 10 x 2# 2 #   20x 20 x 2#    Prone Ext 2 x 10 x 1# 2 X 10 x 2# 2 #   20x 20 x 2#    Lateral raises 20 20 20 20    full can 20 20 20 20    s/l ABD    20                                                Modalities     MH  pre 10 min 10 min 10 min 10 min    CP  post 10 min ---- ---     Visit #  11 12 13 14              Assessment:   Completed full there ex  PROM Left shld was WNL for IR and ER and only end range limitations for flexion    Continues to perform well      Plan:  Continue PT Begin week 10

## 2018-05-31 ENCOUNTER — APPOINTMENT (OUTPATIENT)
Dept: PHYSICAL THERAPY | Facility: CLINIC | Age: 58
End: 2018-05-31
Payer: COMMERCIAL

## 2018-06-04 ENCOUNTER — OFFICE VISIT (OUTPATIENT)
Dept: OBGYN CLINIC | Facility: CLINIC | Age: 58
End: 2018-06-04
Payer: COMMERCIAL

## 2018-06-04 VITALS
BODY MASS INDEX: 22.58 KG/M2 | SYSTOLIC BLOOD PRESSURE: 117 MMHG | DIASTOLIC BLOOD PRESSURE: 77 MMHG | HEART RATE: 72 BPM | HEIGHT: 68 IN | WEIGHT: 149 LBS

## 2018-06-04 DIAGNOSIS — Z98.890 STATUS POST LEFT ROTATOR CUFF REPAIR: Primary | ICD-10-CM

## 2018-06-04 PROCEDURE — 99213 OFFICE O/P EST LOW 20 MIN: CPT | Performed by: ORTHOPAEDIC SURGERY

## 2018-06-04 NOTE — PROGRESS NOTES
Assessment/Plan:  1  Status post left rotator cuff repair       Scribe Attestation    I,:   Yoselin Culp am acting as a scribe while in the presence of the attending physician :        I,:   Aura Faust MD personally performed the services described in this documentation    as scribed in my presence :              I am pleased with Wallace's progress so far  His strength is improving overall  He does need to strengthen shoulder for AB duction however this will come with further physical therapy and continual maintenance and rehabilitation on his left shoulder  Case Esther may finish out his script for physical therapy over the next 2 weeks  He may then continue with home exercises on his own  I emphasized the importance of improving slowly as this is a long a slow process to fully recover  I discussed with Case Santana that the intermittent clicking is a common occurrence for patients status post rotator cuff surgery due to possible scar tissue, and inflammation  However this should continue only occur less over the next year as he continues to improve his strength and range of motion  Would like prior to abstain from stroke swimming for additional 1 month  Case Santana may follow up with me on as-needed basis unless symptoms become worse  Subjective:   Kat Hung is a 61 y o  male who presents 95 days status post left shoulder rotator cuff repair with subacromial decompression  He states that he has no pain with any shoulder exercises or overhead movements  He does note however if he sleeps on his shoulder a week up to mild paresthesias that quickly resolve  His only complaint is start physical therapy after several repetitions of shoulder exercises he will experience a mild click but does not experience pain  Overall Csae Santana very happy with his progress  A currently denies any paresthesias      Review of Systems   Constitutional: Negative  HENT: Negative  Eyes: Negative  Respiratory: Negative  Cardiovascular: Negative  Gastrointestinal: Negative  Endocrine: Negative  Genitourinary: Negative  Musculoskeletal: Negative  Skin: Negative  Allergic/Immunologic: Negative  Neurological: Negative  Hematological: Negative  Psychiatric/Behavioral: Negative  Past Medical History:   Diagnosis Date    Leg pain     bilateral leg pain (chronic)    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    Neoplasm of skin     lasr assessed 5/14/13, 5/25/15       Past Surgical History:   Procedure Laterality Date    ROTATOR CUFF REPAIR      ROTATOR CUFF REPAIR Left 3/1/2018    Procedure: SHOULDER ARTHROSCOPY WITH ROTATOR CUFF REPAIR, SUBACROMIAL DECOMPRESSION, LIMITED SYNOVECTOMY;  Surgeon: Timo Caruso MD;  Location: [facility];  Service: Orthopedics    SHOULDER ARTHROSCOPY      2015    TONSILLECTOMY         Family History   Problem Relation Age of Onset    Hypertension Mother     Diabetes Father     Diabetes Other        Social History     Occupational History    Not on file       Social History Main Topics    Smoking status: Former Smoker     Types: Cigarettes     Quit date: 2/26/1986    Smokeless tobacco: Never Used    Alcohol use Yes      Comment: social    Drug use: No    Sexual activity: Not on file         Current Outpatient Prescriptions:     Acetaminophen (TYLENOL) 325 MG CAPS, Take by mouth, Disp: , Rfl:     Ascorbic Acid (VITAMIN C) 100 MG tablet, Take 300 mg by mouth daily, Disp: , Rfl:     b complex vitamins capsule, Take 1 capsule by mouth daily, Disp: , Rfl:     cholecalciferol (VITAMIN D3) 1,000 units tablet, Take 1,000 Units by mouth, Disp: , Rfl:     Coenzyme Q10 60 MG CAPS, Take 1 tablet by mouth, Disp: , Rfl:     hydroxychloroquine (PLAQUENIL) 200 mg tablet, Take 1 tablet by mouth Twice daily, Disp: , Rfl:     MAGNESIUM PO, Take 1 tablet by mouth, Disp: , Rfl:     milk thistle 175 MG tablet, Take 175 mg by mouth daily, Disp: , Rfl:     Multiple Vitamin (MULTIVITAMIN) tablet, Take 1 tablet by mouth daily, Disp: , Rfl:     NON FORMULARY, Marine collagen peptides ; liver anti oxidents 1 tab a m , Disp: , Rfl:     PROBIOTIC PRODUCT PO, Take 1 tablet by mouth, Disp: , Rfl:     saccharomyces boulardii (FLORASTOR) 250 mg capsule, Take 250 mg by mouth every morning, Disp: , Rfl:     Multiple Vitamin (TAB-A-COLLEEN) TABS, Take 1 tablet by mouth, Disp: , Rfl:     Allergies   Allergen Reactions    Azithromycin Rash and Hives       Objective:  Vitals:    06/04/18 0814   BP: 117/77   Pulse: 72       Left Shoulder Exam     Tenderness   The patient is experiencing no tenderness  Range of Motion   Active Abduction: 160   Forward Flexion: 170   External Rotation: 90   Internal Rotation 0 degrees: L1     Muscle Strength   Abduction: 4/5   Internal Rotation: 5/5   External Rotation: 5/5     Other   Erythema: absent  Scars: present  Sensation: normal  Pulse: present     Comments:  Scars are well-healed and intact with no sign of infection  Physical Exam   Constitutional: He is oriented to person, place, and time  He appears well-developed and well-nourished  HENT:   Head: Normocephalic and atraumatic  Eyes: Conjunctivae are normal    Cardiovascular: Normal rate  Pulmonary/Chest: Effort normal    Musculoskeletal:   As noted in HPI   Neurological: He is alert and oriented to person, place, and time  Skin: Skin is warm and dry  Psychiatric: He has a normal mood and affect   His behavior is normal  Judgment and thought content normal

## 2018-06-05 ENCOUNTER — OFFICE VISIT (OUTPATIENT)
Dept: PHYSICAL THERAPY | Facility: CLINIC | Age: 58
End: 2018-06-05
Payer: COMMERCIAL

## 2018-06-05 DIAGNOSIS — M75.122 COMPLETE TEAR OF LEFT ROTATOR CUFF: Primary | ICD-10-CM

## 2018-06-05 PROCEDURE — 97140 MANUAL THERAPY 1/> REGIONS: CPT

## 2018-06-05 PROCEDURE — 97110 THERAPEUTIC EXERCISES: CPT

## 2018-06-05 NOTE — PROGRESS NOTES
Daily Note     Today's date: 2018  Patient name: Bertha Bustillos  : 1960  MRN: [id number]  Referring provider: Eric Pickens MD  Dx:   Encounter Diagnosis     ICD-10-CM    1  Complete tear of left rotator cuff M75 122        Start Time: 0500  Stop Time: 0600  Total time in clinic (min): 60 minutes    Subjective: Denies left shldr pain; has difficulty reaching out to the side with weight in the left hand     Objective: See treatment diary below      Precautions: - weakness bilateral legs , unexplained weight loss (15 lbs in the last year)    Specialty Daily Treatment Diary  Surgery on 3/2/18    Manual  18   ST mobs 3 min 3 min 3 min 3 min    PROM as per protocol 5 min 4 min 6 min 6 min    STM to UT , levator scap 7  min 8 min 5 min 6 min 10 min                        Exercise Diary        Elbow flex /ext AROM 30 x 3# 30 x 4 # 30x  6#  30 x 5#    Gripping        Pendulums 4 min 4 min Not today      Scap squeeze 30 30 30 30    Wand flex 20 20 20 20    pulleys 30 30 30x 30    Prone row 2 x 10 x 1# 2 X 10 X 2# 2 #   20x 20 x 2#    S/L  ER 20  20 20 20    Wand ER 20 20 20 20    Ball roll 20 20 20 20    Prone Horz ABD 2 x 10 x 1# 2 X 10 x 2# 2 #   20x 20 x 2#    Prone Ext 2 x 10 x 1# 2 X 10 x 2# 2 #   20x 20 x 2#    Lateral raises 20 20 20 20    full can 20 20  20    s/l ABD    20    tband rows              Ext  Green 2 x 10 ea                                            Modalities  6   MH  pre 10 min 10 min 10 min 10 min 10 min    CP  post 10 min ---- ---     Visit #  11 12 13 14              Assessment: Tolerated treatment well  Patient would benefit from continued PT; pt progressed to 6 lb with bicep curls; performs stance scaption with mild fatigue but with good range & form       Plan: Continue per plan of care

## 2018-06-07 ENCOUNTER — OFFICE VISIT (OUTPATIENT)
Dept: PHYSICAL THERAPY | Facility: CLINIC | Age: 58
End: 2018-06-07
Payer: COMMERCIAL

## 2018-06-07 DIAGNOSIS — M75.122 COMPLETE TEAR OF LEFT ROTATOR CUFF: Primary | ICD-10-CM

## 2018-06-07 PROCEDURE — 97110 THERAPEUTIC EXERCISES: CPT | Performed by: PHYSICAL THERAPIST

## 2018-06-07 PROCEDURE — 97140 MANUAL THERAPY 1/> REGIONS: CPT | Performed by: PHYSICAL THERAPIST

## 2018-06-07 NOTE — PROGRESS NOTES
Daily Note     Today's date: 2018  Patient name: Jessenia Shields  : 1960  MRN: [id number]  Referring provider: Redd Nunez MD  Dx:   Encounter Diagnosis     ICD-10-CM    1  Complete tear of left rotator cuff M75 122                   Subjective: Denies left shldr pain    Objective: See treatment diary below      Precautions: - weakness bilateral legs , unexplained weight loss (15 lbs in the last year)    Specialty Daily Treatment Diary  Surgery on 3/2/18    Manual  18       ST mobs 2 min       PROM as per protocol 5 min       STM to UT , levator scap 5  min                           Exercise Diary         Elbow flex /ext AROM 30 x 6#       TBand ext Green  20x       Prone 3 way 3#  20x       Bent row 30    5#       Wand flex 20       pulleys 30       S/L ABD 2 x 10 x 1#       S/L  ER 20        Wand ER 20       Ball roll 20       AROM flex / Abd to 90 20x       NuStep                                                                            Modalities        MH  pre 10 min       CP  post deferred       Visit #  16                 Assessment:  Scapular control to 90 degrees AROM flex and Abd was good  Plan: Continue per plan of care    Attempt NuStep next session

## 2018-06-12 ENCOUNTER — OFFICE VISIT (OUTPATIENT)
Dept: PHYSICAL THERAPY | Facility: CLINIC | Age: 58
End: 2018-06-12
Payer: COMMERCIAL

## 2018-06-12 DIAGNOSIS — M79.10 MYALGIA: ICD-10-CM

## 2018-06-12 DIAGNOSIS — M75.122 COMPLETE TEAR OF LEFT ROTATOR CUFF: Primary | ICD-10-CM

## 2018-06-12 PROCEDURE — 97110 THERAPEUTIC EXERCISES: CPT

## 2018-06-12 PROCEDURE — 97140 MANUAL THERAPY 1/> REGIONS: CPT

## 2018-06-12 NOTE — PROGRESS NOTES
Daily Note     Today's date: 2018  Patient name: Wendy Francois  : 1960  MRN: [id number]  Referring provider: Sierra Otto MD  Dx:   Encounter Diagnosis     ICD-10-CM    1  Complete tear of left rotator cuff M75 122    2  Myalgia M79 1        Start Time: 0500  Stop Time: 0600  Total time in clinic (min): 60 minutes    Subjective:  Pt denies pain left shldr; has difficulty lifting anything overhead that has some weight to it     Objective: See treatment diary below      Precautions: - weakness bilateral legs , unexplained weight loss (15 lbs in the last year)    Specialty Daily Treatment Diary  Surgery on 3/2/18    Manual  18       ST mobs 2 min       PROM as per protocol 5 min X 2 min       STM to UT , levator scap 5  min X 10 min                           Exercise Diary         Elbow flex /ext AROM 30 x 6# 30 x 6#      TBand ext Green  20x Green x 20      Prone 3 way 3#  20x 3# 20 x       Bent row 30    5# Prone 3#      Wand flex 20       pulleys 30 Not today       S/L ABD 2 x 10 x 1#       S/L  ER 20  3# x 20       Wand ER 20       Ball roll 20 20       AROM flex / Abd to 90 20x scaption 1 lb 2 x 10       NuStep          Overhead 3 # lift/lower x 10 reps                                                                   Modalities        MH  pre 10 min 10 min       CP  post deferred       Visit #  16 17                Assessment: Tolerated treatment well  Patient would benefit from continued PT; pt progressing with strengthening-able to add overhead lift 3 lb wt with slow eccentric lower & 1lb to stance scaption          Plan: Continue per plan of care

## 2018-06-14 ENCOUNTER — APPOINTMENT (OUTPATIENT)
Dept: PHYSICAL THERAPY | Facility: CLINIC | Age: 58
End: 2018-06-14
Payer: COMMERCIAL

## 2018-06-19 ENCOUNTER — OFFICE VISIT (OUTPATIENT)
Dept: PHYSICAL THERAPY | Facility: CLINIC | Age: 58
End: 2018-06-19
Payer: COMMERCIAL

## 2018-06-19 DIAGNOSIS — M75.102 TEAR OF LEFT ROTATOR CUFF, UNSPECIFIED TEAR EXTENT: ICD-10-CM

## 2018-06-19 DIAGNOSIS — M75.122 COMPLETE TEAR OF LEFT ROTATOR CUFF: Primary | ICD-10-CM

## 2018-06-19 PROCEDURE — 97110 THERAPEUTIC EXERCISES: CPT

## 2018-06-19 PROCEDURE — 97140 MANUAL THERAPY 1/> REGIONS: CPT

## 2018-06-19 NOTE — PROGRESS NOTES
Daily Note     Today's date: 2018  Patient name: Dyan Horton  : 1960  MRN: [id number]  Referring provider: Natanael He MD  Dx:   Encounter Diagnosis     ICD-10-CM    1  Complete tear of left rotator cuff M75 122    2  Tear of left rotator cuff, unspecified tear extent M75 102                   Subjective:  Pt reports no L shld pain today    Objective: See treatment diary below      Precautions: - weakness bilateral legs , unexplained weight loss (15 lbs in the last year)    Specialty Daily Treatment Diary  Surgery on 3/2/18    Manual  18     ST mobs 2 min       PROM as per protocol 5 min X 2 min  4 min     STM to UT , levator scap 5  min X 10 min  10 min                         Exercise Diary         Elbow flex /ext AROM 30 x 6# 30 x 6# 30 X 6#     TBand ext Green  20x Green x 20 20 X Blue     Prone 3 way 3#  20x 3# 20 x  20 X 3 #     Bent row 30    5# Prone 3# 20 x 3#     Wand flex 20       pulleys 30 Not today  30     S/L ABD 2 x 10 x 1#       S/L  ER 20  3# x 20  20 X 3#     Wand ER 20       Ball roll 20 20       AROM flex / Abd to 90 20x scaption 1 lb 2 x 10  10     NuStep   10 min     Overhead lift to high shelf  Overhead 3 # lift/lower x 10 reps  10 x 3#     T band ER/ IR   20 x red     T band Ext from HI pull   20 X blue                                                 Modalities        MH  pre 10 min 10 min  ------------     CP  post deferred       Visit #  16 17  18              Assessment: Tolerated treatment well  Pt displayed No difficulty with t band ext  And ER/IR  Patient would benefit from continued PT;          Plan: Continue per plan of care

## 2018-06-21 ENCOUNTER — OFFICE VISIT (OUTPATIENT)
Dept: PHYSICAL THERAPY | Facility: CLINIC | Age: 58
End: 2018-06-21
Payer: COMMERCIAL

## 2018-06-21 DIAGNOSIS — M75.102 TEAR OF LEFT ROTATOR CUFF, UNSPECIFIED TEAR EXTENT: ICD-10-CM

## 2018-06-21 DIAGNOSIS — M75.122 COMPLETE TEAR OF LEFT ROTATOR CUFF: Primary | ICD-10-CM

## 2018-06-21 PROCEDURE — G8985 CARRY GOAL STATUS: HCPCS | Performed by: PHYSICAL THERAPIST

## 2018-06-21 PROCEDURE — 97110 THERAPEUTIC EXERCISES: CPT

## 2018-06-21 PROCEDURE — G8986 CARRY D/C STATUS: HCPCS | Performed by: PHYSICAL THERAPIST

## 2018-06-21 PROCEDURE — 97140 MANUAL THERAPY 1/> REGIONS: CPT

## 2018-06-21 NOTE — PROGRESS NOTES
Daily Note     Today's date: 2018  Patient name: Jessica Delgado  : 1960  MRN: [id number]  Referring provider: Mireille Holman MD  Dx:   Encounter Diagnosis     ICD-10-CM    1  Complete tear of left rotator cuff M75 122    2  Tear of left rotator cuff, unspecified tear extent M75 102                   Subjective:  Pt reports no L shld pain today    Objective: See treatment diary below      Precautions: - weakness bilateral legs , unexplained weight loss (15 lbs in the last year)    Specialty Daily Treatment Diary  Surgery on 3/2/18    Manual  18    ST mobs 2 min       PROM as per protocol 5 min X 2 min  4 min 4 min    STM to UT , levator scap 5  min X 10 min  10 min 10 min                        Exercise Diary        Elbow flex /ext AROM 30 x 6# 30 x 6# 30 X 6# 30 X 6#    TBand ext Green  20x Green x 20 20 X Blue 20 X blue    Prone 3 way 3#  20x 3# 20 x  20 X 3 # 20 x 3#    Bent row 30    5# Prone 3# 20 x 3# 20 X 3#    Wand flex 20       pulleys 30 Not today  30 30    S/L ABD 2 x 10 x 1#   2 x 10    S/L  ER 20  3# x 20  20 X 3# 20 x 3#    Wand ER 20       Ball roll 20 20       AROM flex / Abd to 90 20x scaption 1 lb 2 x 10  10 10    NuStep   10 min 10 min    Overhead lift to high shelf  Overhead 3 # lift/lower x 10 reps  10 x 3# 10 x 3#    T band ER/ IR   20 x red 20    T band Ext from HI pull   20 X blue 20 X blue                                                 Modalities       MH  pre 10 min 10 min  ------------     CP  post deferred   10 min    Visit #  16 17  18 19             Assessment: Tolerated treatment well  Pt reports feeling 98%  Better over all    Still shows some difficulty with  Over head activities but has no complaints of pain Patient          Plan:  DC pt at this time secondary to meeting personal goals

## 2018-06-21 NOTE — PROGRESS NOTES
PT Discharge    Today's date: 2018  Patient name: Hunter López  : 1960  MRN: [id number]  Referring provider: Charlie Lopez MD  Dx:   Encounter Diagnosis     ICD-10-CM    1  Complete tear of left rotator cuff M75 122    2  Tear of left rotator cuff, unspecified tear extent M75 102        Start Time: 0500  Stop Time: 0600  Total time in clinic (min): 60 minutes    Assessment    Assessment details:       Goals  Short term goals:  (4 weeks)  1  Patient will have pain level 2/10 left shoulder at rest - met  2  Patient will report a 50% improvement in symptoms with ADL's - met  4  Patient will have improved Left shoulder ROM by 30 degrees - met    Long term goals: (8 weeks)  1  Patient will report 85 % improvement improvement in symptoms with ADL's  - met  2  Patient will demonstrate appropriate scapulohumeral rhythm with reaching overhead - met  3   Patient will be independent in a comprehensive home exercise program - met      Plan  Plan details: D/C at this time        Subjective Evaluation    History of Present Illness  Mechanism of injury: He feels 98% better overrall  Pain  Current pain ratin  At best pain ratin  At worst pain ratin          Objective     Passive Range of Motion   Left Shoulder   Normal passive range of motion    Right Shoulder   Flexion: 165 degrees   Abduction: 165 degrees   External rotation 0°: 85 degrees   Internal rotation 0°: 85 degrees     Scapular Mobility   Left Shoulder   Scapular mobility: good    Strength/Myotome Testing     Left Shoulder     Planes of Motion   Flexion: 4   Abduction: 4   External rotation at 0°: 4+   Internal rotation at 0°: 4+     Right Shoulder     Planes of Motion   Flexion: 4+   Abduction: 4+   External rotation at 0°: 4+   Internal rotation at 0°: 4+       Flowsheet Rows      Most Recent Value   PT/OT G-Codes   Current Score  79   Projected Score  67   FOTO information reviewed  Yes   Assessment Type  Discharge   G code set Carrying, Moving & Handling Objects   Carrying, Moving and Handling Objects Goal Status ()  CJ   Carrying, Moving and Handling Objects Discharge Status ()  CJ          Precautions: - weakness bilateral legs , unexplained weight loss (15 lbs in the last year)

## 2018-06-22 ENCOUNTER — TELEPHONE (OUTPATIENT)
Dept: FAMILY MEDICINE CLINIC | Facility: CLINIC | Age: 58
End: 2018-06-22

## 2018-06-22 ENCOUNTER — TELEPHONE (OUTPATIENT)
Dept: HEMATOLOGY ONCOLOGY | Facility: CLINIC | Age: 58
End: 2018-06-22

## 2018-06-22 NOTE — TELEPHONE ENCOUNTER
Wife, Lexiesweetie Rivers is calling to speak with Dr Watts regarding chest xray that Shivam Tanner had done recently  He has swollen lymph nodes in chest   He needs to get Biopsy and wants to know if Dr Val Pradhan would order this?     Please call wife Lexie Rivers [phone]    Thanks

## 2018-06-22 NOTE — TELEPHONE ENCOUNTER
Julius identified herself as a neurologist at [employer]  Wanting to give clinical reasons that this patient needs an appointment with a hemotologist/oncologist  Calling on behalf of patient  Call transferred to [facility]

## 2018-06-22 NOTE — TELEPHONE ENCOUNTER
S/w Julius Price  He saw a neurologist in [facility]  Muscle bx normal, no viral myositis  CT chest showed 3 lymph nodes  Neuro suggested heme/onc eval but no heme/onc will see w/o a dx  They are not sure what to do  CT report being mailed to them  Wondering about a biopsy  I said that they could try to get me a copy of report and keep CD images and I could possibly give referral to IR to consider bx if able but may need thoracic surgeon eval otherwise  They understand

## 2018-06-25 ENCOUNTER — TELEPHONE (OUTPATIENT)
Dept: FAMILY MEDICINE CLINIC | Facility: CLINIC | Age: 58
End: 2018-06-25

## 2018-06-25 NOTE — TELEPHONE ENCOUNTER
S/w pt  Reviewed CT  PET was suggested but he does finally have an oncologist appt tomorrow in [facility] so we will start with that

## 2018-06-25 NOTE — TELEPHONE ENCOUNTER
[facility]  Pls  See at nurses station

## 2018-06-26 ENCOUNTER — OFFICE VISIT (OUTPATIENT)
Dept: HEMATOLOGY ONCOLOGY | Facility: CLINIC | Age: 58
End: 2018-06-26
Payer: COMMERCIAL

## 2018-06-26 ENCOUNTER — TELEPHONE (OUTPATIENT)
Dept: FAMILY MEDICINE CLINIC | Facility: CLINIC | Age: 58
End: 2018-06-26

## 2018-06-26 ENCOUNTER — HOSPITAL ENCOUNTER (INPATIENT)
Facility: HOSPITAL | Age: 58
LOS: 3 days | Discharge: HOME | DRG: 092 | End: 2018-06-29
Attending: EMERGENCY MEDICINE | Admitting: INTERNAL MEDICINE
Payer: COMMERCIAL

## 2018-06-26 VITALS
TEMPERATURE: 97.9 F | HEART RATE: 79 BPM | WEIGHT: 146 LBS | DIASTOLIC BLOOD PRESSURE: 70 MMHG | OXYGEN SATURATION: 98 % | RESPIRATION RATE: 14 BRPM | SYSTOLIC BLOOD PRESSURE: 104 MMHG | BODY MASS INDEX: 22.91 KG/M2 | HEIGHT: 67 IN

## 2018-06-26 DIAGNOSIS — M79.10 MYALGIA: ICD-10-CM

## 2018-06-26 DIAGNOSIS — R63.4 WEIGHT LOSS: ICD-10-CM

## 2018-06-26 DIAGNOSIS — R13.19 ESOPHAGEAL DYSPHAGIA: ICD-10-CM

## 2018-06-26 DIAGNOSIS — M25.461 KNEE EFFUSION, RIGHT: ICD-10-CM

## 2018-06-26 DIAGNOSIS — R63.4 WEIGHT LOSS, UNINTENTIONAL: Primary | ICD-10-CM

## 2018-06-26 DIAGNOSIS — R13.10 DYSPHAGIA: ICD-10-CM

## 2018-06-26 DIAGNOSIS — R59.1 LYMPHADENOPATHY: ICD-10-CM

## 2018-06-26 DIAGNOSIS — M62.50 MUSCLE ATROPHY: ICD-10-CM

## 2018-06-26 DIAGNOSIS — M62.50 MUSCULAR ATROPHY, UNSPECIFIED SITE: ICD-10-CM

## 2018-06-26 DIAGNOSIS — R53.1 WEAKNESS: Primary | ICD-10-CM

## 2018-06-26 PROBLEM — B37.0 ORAL CANDIDIASIS: Status: ACTIVE | Noted: 2018-06-26

## 2018-06-26 PROBLEM — E66.3 OVERWEIGHT: Status: RESOLVED | Noted: 2017-02-17 | Resolved: 2018-06-26

## 2018-06-26 LAB
ALBUMIN SERPL BCP-MCNC: 3.5 G/DL (ref 3.5–5)
ALP SERPL-CCNC: 113 U/L (ref 46–116)
ALT SERPL W P-5'-P-CCNC: 30 U/L (ref 12–78)
ANION GAP SERPL CALCULATED.3IONS-SCNC: 5 MMOL/L (ref 4–13)
AST SERPL W P-5'-P-CCNC: 21 U/L (ref 5–45)
BASOPHILS # BLD AUTO: 0.05 THOUSANDS/ΜL (ref 0–0.1)
BASOPHILS NFR BLD AUTO: 1 % (ref 0–1)
BILIRUB SERPL-MCNC: 0.41 MG/DL (ref 0.2–1)
BUN SERPL-MCNC: 15 MG/DL (ref 5–25)
CALCIUM SERPL-MCNC: 10 MG/DL (ref 8.3–10.1)
CHLORIDE SERPL-SCNC: 101 MMOL/L (ref 100–108)
CK SERPL-CCNC: 35 U/L (ref 39–308)
CO2 SERPL-SCNC: 31 MMOL/L (ref 21–32)
CREAT SERPL-MCNC: 0.68 MG/DL (ref 0.6–1.3)
CRYSTALS SNV QL MICRO: NORMAL
EOSINOPHIL # BLD AUTO: 0.15 THOUSAND/ΜL (ref 0–0.61)
EOSINOPHIL NFR BLD AUTO: 1 % (ref 0–6)
ERYTHROCYTE [DISTWIDTH] IN BLOOD BY AUTOMATED COUNT: 13.2 % (ref 11.6–15.1)
ERYTHROCYTE [SEDIMENTATION RATE] IN BLOOD: 83 MM/HOUR (ref 0–10)
GFR SERPL CREATININE-BSD FRML MDRD: 105 ML/MIN/1.73SQ M
GLUCOSE SERPL-MCNC: 79 MG/DL (ref 65–140)
HCT VFR BLD AUTO: 41.5 % (ref 36.5–49.3)
HGB BLD-MCNC: 13 G/DL (ref 12–17)
IMM GRANULOCYTES # BLD AUTO: 0.08 THOUSAND/UL (ref 0–0.2)
IMM GRANULOCYTES NFR BLD AUTO: 1 % (ref 0–2)
LYMPHOCYTES # BLD AUTO: 1.97 THOUSANDS/ΜL (ref 0.6–4.47)
LYMPHOCYTES # SNV MANUAL: 19 %
LYMPHOCYTES NFR BLD AUTO: 18 % (ref 14–44)
MCH RBC QN AUTO: 29 PG (ref 26.8–34.3)
MCHC RBC AUTO-ENTMCNC: 31.3 G/DL (ref 31.4–37.4)
MCV RBC AUTO: 93 FL (ref 82–98)
MONOCYTES # BLD AUTO: 1.29 THOUSAND/ΜL (ref 0.17–1.22)
MONOCYTES NFR BLD AUTO: 12 % (ref 4–12)
MONOCYTES NFR SNV MANUAL: 5 %
NEUTROPHILS # BLD AUTO: 7.49 THOUSANDS/ΜL (ref 1.85–7.62)
NEUTROPHILS NFR SNV MANUAL: 76 %
NEUTS SEG NFR BLD AUTO: 67 % (ref 43–75)
NRBC BLD AUTO-RTO: 0 /100 WBCS
PLATELET # BLD AUTO: 426 THOUSANDS/UL (ref 149–390)
PMV BLD AUTO: 9.5 FL (ref 8.9–12.7)
POTASSIUM SERPL-SCNC: 4.2 MMOL/L (ref 3.5–5.3)
PROT SERPL-MCNC: 8.7 G/DL (ref 6.4–8.2)
RBC # BLD AUTO: 4.48 MILLION/UL (ref 3.88–5.62)
RBC # SNV MANUAL: 84 /UL (ref 0–10)
SODIUM SERPL-SCNC: 137 MMOL/L (ref 136–145)
TOTAL CELLS COUNTED SPEC: 100
WBC # BLD AUTO: 11.03 THOUSAND/UL (ref 4.31–10.16)
WBC # FLD MANUAL: ABNORMAL /UL (ref 0–200)

## 2018-06-26 PROCEDURE — 89051 BODY FLUID CELL COUNT: CPT | Performed by: ORTHOPAEDIC SURGERY

## 2018-06-26 PROCEDURE — 82550 ASSAY OF CK (CPK): CPT | Performed by: EMERGENCY MEDICINE

## 2018-06-26 PROCEDURE — 87205 SMEAR GRAM STAIN: CPT | Performed by: ORTHOPAEDIC SURGERY

## 2018-06-26 PROCEDURE — 85025 COMPLETE CBC W/AUTO DIFF WBC: CPT | Performed by: EMERGENCY MEDICINE

## 2018-06-26 PROCEDURE — 85652 RBC SED RATE AUTOMATED: CPT | Performed by: EMERGENCY MEDICINE

## 2018-06-26 PROCEDURE — 87070 CULTURE OTHR SPECIMN AEROBIC: CPT | Performed by: ORTHOPAEDIC SURGERY

## 2018-06-26 PROCEDURE — 99285 EMERGENCY DEPT VISIT HI MDM: CPT

## 2018-06-26 PROCEDURE — 96360 HYDRATION IV INFUSION INIT: CPT

## 2018-06-26 PROCEDURE — 99223 1ST HOSP IP/OBS HIGH 75: CPT | Performed by: INTERNAL MEDICINE

## 2018-06-26 PROCEDURE — 1111F DSCHRG MED/CURRENT MED MERGE: CPT | Performed by: INTERNAL MEDICINE

## 2018-06-26 PROCEDURE — 99205 OFFICE O/P NEW HI 60 MIN: CPT | Performed by: INTERNAL MEDICINE

## 2018-06-26 PROCEDURE — 36415 COLL VENOUS BLD VENIPUNCTURE: CPT | Performed by: EMERGENCY MEDICINE

## 2018-06-26 PROCEDURE — 89060 EXAM SYNOVIAL FLUID CRYSTALS: CPT | Performed by: ORTHOPAEDIC SURGERY

## 2018-06-26 PROCEDURE — 89050 BODY FLUID CELL COUNT: CPT | Performed by: ORTHOPAEDIC SURGERY

## 2018-06-26 PROCEDURE — 80053 COMPREHEN METABOLIC PANEL: CPT | Performed by: EMERGENCY MEDICINE

## 2018-06-26 PROCEDURE — 0S9C3ZZ DRAINAGE OF RIGHT KNEE JOINT, PERCUTANEOUS APPROACH: ICD-10-PCS | Performed by: ORTHOPAEDIC SURGERY

## 2018-06-26 RX ORDER — ONDANSETRON 2 MG/ML
4 INJECTION INTRAMUSCULAR; INTRAVENOUS EVERY 6 HOURS PRN
Status: DISCONTINUED | OUTPATIENT
Start: 2018-06-26 | End: 2018-06-29 | Stop reason: HOSPADM

## 2018-06-26 RX ORDER — ACETAMINOPHEN 325 MG/1
650 TABLET ORAL EVERY 6 HOURS PRN
Status: DISCONTINUED | OUTPATIENT
Start: 2018-06-26 | End: 2018-06-29 | Stop reason: HOSPADM

## 2018-06-26 RX ORDER — LIDOCAINE HYDROCHLORIDE 10 MG/ML
10 INJECTION, SOLUTION EPIDURAL; INFILTRATION; INTRACAUDAL; PERINEURAL ONCE
Status: DISCONTINUED | OUTPATIENT
Start: 2018-06-26 | End: 2018-06-29 | Stop reason: HOSPADM

## 2018-06-26 RX ADMIN — NYSTATIN 500000 UNITS: 100000 SUSPENSION ORAL at 17:47

## 2018-06-26 RX ADMIN — NYSTATIN 500000 UNITS: 100000 SUSPENSION ORAL at 21:43

## 2018-06-26 RX ADMIN — SODIUM CHLORIDE 1000 ML: 0.9 INJECTION, SOLUTION INTRAVENOUS at 11:23

## 2018-06-26 NOTE — ASSESSMENT & PLAN NOTE
New onset over the past few weeks  Difficulty swallowing solids and has to drink liquids assist with swallowing  Denies any dysphagia with liquids  Likely secondary to his unspecified myopathy versus Candida esophagitis  Check swallow evaluation  Start nystatin swish and swallow  Consult Gastroenterology for possible endoscopy

## 2018-06-26 NOTE — CONSULTS
Orthopedics   Matthew Velazquez 61 y o  male MRN: [id number]  Unit/Bed#: [facility] 627-01      Chief Complaint:   right knee pain    HPI:   61 y o male complaining of right knee pain for the past week  He has a 1 year history of poly-articular pain that includes his shoulders, elbows, wrists, hips, knees, and ankles  The pain is not migratory and is constant  Pain is worse at rest and at the end of the day and is made better with movement, aleve, and the prednisone he took at the beginning of this pain  Pain has been worse in the last 6 months with no identifiable trauma or inciting event  His ankles have been usually swollen, but now his knee has been painful and swollen for the last week  He is able to bear weight on it and pain does not radiate  He also complains of fevers, sweats, and chills  He has never felt this pain before in this joint but it feels the same as other joints      Review Of Systems:   · Skin: Normal  · Neuro: See HPI  · Musculoskeletal: See HPI  · 14 point review of systems negative except as stated above     Past Medical History:   Past Medical History:   Diagnosis Date    Leg pain     bilateral leg pain (chronic)    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    Neoplasm of skin     lasr assessed 5/14/13, 5/25/15       Past Surgical History:   Past Surgical History:   Procedure Laterality Date    ROTATOR CUFF REPAIR      ROTATOR CUFF REPAIR Left 3/1/2018    Procedure: SHOULDER ARTHROSCOPY WITH ROTATOR CUFF REPAIR, SUBACROMIAL DECOMPRESSION, LIMITED SYNOVECTOMY;  Surgeon: Diogenes Soria MD;  Location: [address] [place] [address];  Service: Orthopedics    SHOULDER ARTHROSCOPY      2015    TONSILLECTOMY         Family History:  Family history reviewed and non-contributory  Family History   Problem Relation Age of Onset    Hypertension Mother     Diabetes Father     Arthritis Father     Diabetes Other        Social History:  Social History     Social History    Marital status: /Civil Union     Spouse name: N/A    Number of children: N/A    Years of education: N/A     Social History Main Topics    Smoking status: Former Smoker     Packs/day: 1 00     Years: 15 00     Types: Cigarettes     Quit date: 2/26/1986    Smokeless tobacco: Never Used    Alcohol use 0 6 oz/week     1 Standard drinks or equivalent per week      Comment: social vodka    Drug use: No    Sexual activity: Yes     Partners: Female     Other Topics Concern    None     Social History Narrative    None       Allergies: Allergies   Allergen Reactions    Azithromycin Rash and Hives           Labs:    0  Lab Value Date/Time   HCT 41 5 06/26/2018 1122   HCT 41 2 02/01/2018 0811   HCT 44 0 05/01/2017 0727   HCT 46 7 05/24/2016 0954   HGB 13 0 06/26/2018 1122   HGB 14 0 02/01/2018 0811   HGB 14 8 05/01/2017 0727   HGB 16 0 05/24/2016 0954   INR 1 0 02/01/2018 0811   WBC 11 03 (H) 06/26/2018 1122   WBC 10 5 05/01/2017 0727   WBC 9 4 05/24/2016 0954   ESR 83 (H) 06/26/2018 1122   ESR 10 05/01/2017 0727       Meds:    Current Facility-Administered Medications:     acetaminophen (TYLENOL) tablet 650 mg, 650 mg, Oral, Q6H PRN, Kristy Lutz MD    lidocaine (PF) (XYLOCAINE-MPF) 1 % injection 10 mL, 10 mL, Infiltration, Once, Hanny Cedeno MD    magnesium hydroxide (MILK OF MAGNESIA) 400 mg/5 mL oral suspension 30 mL, 30 mL, Oral, Daily PRN, Kristy Lutz MD    nystatin (MYCOSTATIN) oral suspension 500,000 Units, 500,000 Units, Swish & Swallow, 4x Daily, Kristy Lutz MD    ondansetron (ZOFRAN) injection 4 mg, 4 mg, Intravenous, Q6H PRN, Kristy Lutz MD    Blood Culture:   No results found for: BLOODCX    Wound Culture:   No results found for: WOUNDCULT    Ins and Outs:  I/O last 24 hours:   In: 1000 [IV Piggyback:1000]  Out: -           Physical Exam:   /84 (BP Location: Right arm)   Pulse 84   Temp (!) 96 8 °F (36 °C) (Tympanic)   Resp 18   SpO2 99%   Gen: Alert and oriented to person, place, time  HEENT: EOMI, eyes clear, moist mucus membranes, hearing intact  Respiratory: Bilateral chest rise  No audible wheezing found  Cardiovascular: Regular Rate and Rhythm  Abdomen: soft nontender/nondistended  Musculoskeletal: right lower extremity  · Skin intact, moderate effusion  · Tender to palpation over anterior knee  · Can perform straight leg raise  · Stable to varus/valgus stress  · Negative lachmans, Posterior draw  · Sensation intact L2-S1  · 5/5 strength to hip flexion/extension, knee flexion/extension ankle dorsi/plantar flexion, EHL/FHL  · 2+ DP pulse    Radiology:   No films    Assessment:  57 y o male with right knee effusion  Differential includes inflammatory process including gout or psuedogout, myopathy, or infectious process which is lowest on the differential     Plan:   · Weight bearing as tolerated  right lower extremity  · PT  · Pain control  · Aspiration of right knee with labs sent  Will follow labs  · Dispo: Ortho will follow    Procedure- Orthopedics   Darlene Seen 61 y o  male MRN: [id number]  Unit/Bed#: [facility] 627-01    Procedure: right knee aspiration    After sterile preparation of the skin overlying the knee local anesthetic was provided with 5cc of 1% lidocaine  An 18 gauge needle was then  inserted via a superior lateral portal   Approx 80 cc of synovial fluid was aspirated and sent for gram stain, culture, synovial WBC/RBC, and crystals  Sterile dressing was then applied  Pt tolerated the procedure well and was neurovascularly intact both pre and post procedure      MD Arelis Dove MD

## 2018-06-26 NOTE — ED ATTENDING ATTESTATION
April Hooks MD, saw and evaluated the patient  I have discussed the patient with the resident/non-physician practitioner and agree with the resident's/non-physician practitioner's findings, Plan of Care, and MDM as documented in the resident's/non-physician practitioner's note, except where noted  All available labs and Radiology studies were reviewed  At this point I agree with the current assessment done in the Emergency Department  I have conducted an independent evaluation of this patient a history and physical is as follows:    Patient sent to the emergency department for further evaluation and inpatient evaluation  The patient reports a history of approximately 1 year of unexplained weight loss (25 lb) myalgias, arthralgias, and generalized weakness  The patient reports that all the symptoms have gradually been increasing and he has seen multiple physicians was felt to find the cause of his symptoms  The patient has also had dysphagia that has been gradually worsening over several months  The patient denies headache, fever, focal weakness, diarrhea, or vomiting  The patient denies headache  Physical exam demonstrates a pleasant alert nontoxic male in no acute distress  HEENT exam is normal   Lungs are clear with equal breath sounds  The heart had a regular rate and rhythm  Abdomen is soft and nontender  Extremities are nontender with a full range of motion  The patient was alert and orient x3 without focal neurologic deficit  Skin had no rash     Critical Care Time  CritCare Time    Procedures

## 2018-06-26 NOTE — PROGRESS NOTES
Oncology Consult Note  Shai Tesfaye 61 y o  male MRN: [id number]  Unit/Bed#:  Encounter: [id number]      Presenting Complaint: weight loss, fatigue, abnormal lymph nodes on the CT scan    History of Presenting Illness:   50-year-old  male who in february 2017 reported myalgia, weakness more in the proximal than the distal muscles with atrophy that is getting worse over the past few months, difficulty swallowing for liquid, he had an EMG indicative of chronic myopathy process however no active myositis, he was evaluated at [facility] in [place] by Rheumatology and Neurology, workup was negative for serum protein electrophoresis, immunofixation, rheumatoid factor however CRP was elevated, CPK was low, vitamin B12, TSH, T4, REBECCA, HIV, CMV, DNA antibodies, aldolase, Sjogren antibodies, Boland antibodies, ACR H receptor with negative  He sed rate was elevated at 31, Abby-Barr virus IgG positive however the IgM was negative  He had EMG on November 2017 showed 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  He was evaluated by neurology at [facility] scheduled to have LP  And MRI of the cervical spine showed multilevel degenerative changes no evidence of spinal canal stenosis or cord impingement  CT scan of the chest abdomen and pelvis on June 2018 showed left paratracheal lymph nodes measuring 1 3 x 0 8 cm, left internal mammary lymph nodes measuring 1 3 x 0 9 cm, AP window lymph node measuring 1 4 x 1 2 cm, no evidence of pericardial effusion, no evidence of masses in the liver or the spleen or the pancreas   Borderline enlarged lymph nodes at the portal region  Blood work on April 2018 showed no evidence of anemia, leukopenia or leukocytosis, normal differential, aldolase 3 9, CPK 29, C-reactive protein 41 5, PTT 32   He told me he received his in the past corticosteroids with improvement he received also Plaquenil without improvement  In the past 2 months the patient start having dysphagia to solids, 20 lb weight loss in 2 months, night sweats, fatigue, muscle pain, right knee pain with effusion  He could not turn his head, reported fatigue in the afternoon  Quit smoking 35 years ago, he drinks alcohol intermittently  Family history significant for sister with ovarian and breast cancer, the family does not know about BRCA1/ 2 mutation    Review of Systems - As stated in the HPI otherwise the fourteen point review of systems was negative      Past Medical History:   Diagnosis Date    Leg pain     bilateral leg pain (chronic)    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    Neoplasm of skin     lasr assessed 5/14/13, 5/25/15       Social History     Social History    Marital status: /Civil Union     Spouse name: N/A    Number of children: N/A    Years of education: N/A     Social History Main Topics    Smoking status: Former Smoker     Types: Cigarettes     Quit date: 2/26/1986    Smokeless tobacco: Never Used    Alcohol use Yes      Comment: social    Drug use: No    Sexual activity: Not Asked     Other Topics Concern    None     Social History Narrative    None       Family History   Problem Relation Age of Onset    Hypertension Mother     Diabetes Father     Diabetes Other        Allergies   Allergen Reactions    Azithromycin Rash and Hives         Current Outpatient Prescriptions:     Acetaminophen (TYLENOL) 325 MG CAPS, Take by mouth, Disp: , Rfl:     b complex vitamins capsule, Take 1 capsule by mouth daily, Disp: , Rfl:     cholecalciferol (VITAMIN D3) 1,000 units tablet, Take 1,000 Units by mouth, Disp: , Rfl:     Coenzyme Q10 60 MG CAPS, Take 1 tablet by mouth, Disp: , Rfl:     MAGNESIUM PO, Take 1 tablet by mouth, Disp: , Rfl:     Multiple Vitamin (MULTIVITAMIN) tablet, Take 1 tablet by mouth daily, Disp: , Rfl:     NON FORMULARY, Marine collagen peptides ; liver anti oxidents 1 tab a m , Disp: , Rfl:     PROBIOTIC PRODUCT PO, Take 1 tablet by mouth, Disp: , Rfl:     saccharomyces boulardii (FLORASTOR) 250 mg capsule, Take 250 mg by mouth every morning, Disp: , Rfl:       /70   Pulse 79   Temp 97 9 °F (36 6 °C)   Resp 14   Ht 5' 7" (1 702 m)   Wt 66 2 kg (146 lb)   SpO2 98%   BMI 22 87 kg/m²       General Appearance:    Alert, oriented, chronically ill, exhausted        Eyes:    PERRL   Ears:    Normal external ear canals, both ears   Nose:   Nares normal, septum midline   Throat:   Mucosa moist  Pharynx without injection  Dark oropharyngeal mucosa with patchy white spot on the right side    Neck:   Supple       Lungs:     Clear to auscultation bilaterally   Chest Wall:    No tenderness or deformity    Heart:    Regular rate and rhythm       Abdomen:     Soft, non-tender, bowel sounds +, no organomegaly           Extremities:   Extremities no cyanosis or edema, right knee effusion, warm on touch       Skin:   no rash or icterus  Lymph nodes:   Cervical, supraclavicular, and axillary nodes normal   Neurologic:   CNII-XII intact, normal strength, +3 reflexes bilaterally             No results found for this or any previous visit (from the past 48 hour(s))  No results found  Assessment and plan:   Non intentional weight loss, with dysphagia to solids, atrophy of the muscles, effusion of the right knee, pain of the right knee and right ankle, elevated CRP at 41, the patient had the initiation of the symptoms in February 2017 he was evaluated at [facility] in [place] by Rheumatology and Urology, workup was negative except for elevation of sed rate, CRP, muscle biopsy was not conclusive, he was treated intermittently with corticosteroid and Plaquenil   The patient has worsening of symptoms in the past 2 months with night sweats, CT scan showed lymphadenopathy in the hilar, mammary, lillian hepaticus area measuring up to 1 4 cm  1   Rule out paraneoplastic syndrome associated with autoimmune disorders, the patient needs to have EGD for dysphagia to solids, differential diagnosis include myositis with dysphagia, neoplastic disorder  2  He needs evaluation by Rheumatology  3  He needs evaluation by Neurology  4   I feel the patient needs to be admitted to the hospital for hydration and EGD as inpatient, the admission Center in for me there is no available beds in [facility], so the patient will be redirected to the ER and then admission to the hospital

## 2018-06-26 NOTE — ASSESSMENT & PLAN NOTE
Starting approximately February of 2017 after a flu-like illness  He states that he took azithromycin and had an allergic reaction, consisting of a rash, and has had progressive proximal muscle weakness since then    After his evaluation he was noted to have lymphadenopathy and was referred to Dr Amy Olivera recommended inpatient evaluation for his dysphagia   "48-year-old  male who in february 2017 reported myalgia, weakness more in the proximal than the distal muscles with atrophy that is getting worse over the past few months, difficulty swallowing for liquid, he had an EMG indicative of chronic myopathy process however no active myositis, he was evaluated at [facility] in [place] by Rheumatology and Neurology, workup was negative for serum protein electrophoresis, immunofixation, rheumatoid factor however CRP was elevated, CPK was low, vitamin B12, TSH, T4, REBECCA, HIV, CMV, DNA antibodies, aldolase, Sjogren antibodies, Boland antibodies, ACR H receptor with negative  He sed rate was elevated at 31, Abby-Barr virus IgG positive however the IgM was negative  He had EMG on November 2017 showed 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  He was evaluated by neurology at [facility] scheduled to have LP  And MRI of the cervical spine showed multilevel degenerative changes no evidence of spinal canal stenosis or cord impingement  CT scan of the chest abdomen and pelvis on June 2018 showed left paratracheal lymph nodes measuring 1 3 x 0 8 cm, left internal mammary lymph nodes measuring 1 3 x 0 9 cm, AP window lymph node measuring 1 4 x 1 2 cm, no evidence of pericardial effusion, no evidence of masses in the liver or the spleen or the pancreas   Borderline enlarged lymph nodes at the portal region  Blood work on April 2018 showed no evidence of anemia, leukopenia or leukocytosis, normal differential, aldolase 3 9, CPK 29, C-reactive protein 41 5, PTT 32   He told me he received his in the past corticosteroids with improvement he received also Plaquenil without improvement  In the past 2 months the patient start having dysphagia to solids, 20 lb weight loss in 2 months, night sweats, fatigue, muscle pain, right knee pain with effusion  He could not turn his head, reported fatigue in the afternoon  Quit smoking 35 years ago, he drinks alcohol intermittently"  Follows with [facility] any preceding [place] (for 2nd opinion) currently no diagnosis has been established after serological studies, EMG testing, muscle biopsy  He reports that his next evaluation will be a lumbar puncture with CSF studies at [facility] with neurologist Dr Karlo Stahl approximately 2 weeks  He is requesting at this evaluation be done while hospitalized here

## 2018-06-26 NOTE — PROGRESS NOTES
PT Discharge    Today's date: 2018  Patient name: Uziel Jara  : 1960  MRN: [id number]  Referring provider: Fazal Navarrete MD  Dx:   Encounter Diagnosis     ICD-10-CM    1  Complete tear of left rotator cuff M75 122    2  Tear of left rotator cuff, unspecified tear extent M75 102        Start Time: 0500  Stop Time: 0600  Total time in clinic (min): 60 minutes    Assessment    Assessment details:       Goals  Short term goals:  (4 weeks)  1  Patient will have pain level 2/10 left shoulder at rest - met  2  Patient will report a 50% improvement in symptoms with ADL's - met  4  Patient will have improved Left shoulder ROM by 30 degrees - met    Long term goals: (8 weeks)  1  Patient will report 85 % improvement improvement in symptoms with ADL's  - met  2  Patient will demonstrate appropriate scapulohumeral rhythm with reaching overhead - met  3   Patient will be independent in a comprehensive home exercise program - met      Plan  Plan details: D/C at this time        Subjective Evaluation    History of Present Illness  Mechanism of injury: He feels at least 95% better overall  Pain  Current pain ratin  At best pain ratin  At worst pain ratin          Objective     Postural Observations  Seated posture: good        Passive Range of Motion   Left Shoulder   Normal passive range of motion    Right Shoulder   Flexion: 165 degrees   Abduction: 165 degrees   External rotation 0°: 85 degrees   Internal rotation 0°: 85 degrees     Scapular Mobility   Left Shoulder   Scapular mobility: good    Strength/Myotome Testing     Left Shoulder     Planes of Motion   Flexion: 4+   Abduction: 4   External rotation at 0°: 4+   Internal rotation at 0°: 4+     Right Shoulder     Planes of Motion   Flexion: 4+   Abduction: 4+   External rotation at 0°: 4+   Internal rotation at 0°: 4+       Flowsheet Rows      Most Recent Value   PT/OT G-Codes   Current Score  79   Projected Score  67   FOTO information reviewed  Yes   Assessment Type  Discharge   G code set  Carrying, Moving & Handling Objects   Carrying, Moving and Handling Objects Goal Status ()  CJ   Carrying, Moving and Handling Objects Discharge Status ()  CJ          Precautions: - weakness bilateral legs , unexplained weight loss (15 lbs in the last year)

## 2018-06-26 NOTE — H&P
H&P- Mellisa Auguste 1960, 61 y o  male MRN: [id number]    Unit/Bed#: ED 23 Encounter: [id number]    Primary Care Provider: Chace Smith DO   Date and time admitted to hospital: 6/26/2018 11:01 AM        * Dysphagia   Assessment & Plan    New onset over the past few weeks  Difficulty swallowing solids and has to drink liquids assist with swallowing  Denies any dysphagia with liquids  Likely secondary to his unspecified myopathy versus Candida esophagitis  Check swallow evaluation  Start nystatin swish and swallow  Consult Gastroenterology for possible endoscopy  Knee effusion, right   Assessment & Plan    New onset  Denies any history gout or injury to the right knee  He will likely require an arthrocentesis with fluid studies to evaluate infectious versus inflammatory arthritis  Consult Orthopedics for aspiration  Lymphadenopathy   Assessment & Plan    CT scan of the chest abdomen and pelvis on June 2018 showed left paratracheal lymph nodes measuring 1 3 x 0 8 cm, left internal mammary lymph nodes measuring 1 3 x 0 9 cm, AP window lymph node measuring 1 4 x 1 2 cm, no evidence of pericardial effusion, no evidence of masses in the liver or the spleen or the pancreas   Borderline enlarged lymph nodes at the portal region  At this time lymph nodes are likely too small to biopsy  Likely follow up with Oncology as an outpatient to arrange PET scan to evaluate for abnormal metabolic activity  Muscle atrophy   Assessment & Plan    Starting approximately February of 2017 after a flu-like illness  He states that he took azithromycin and had an allergic reaction, consisting of a rash, and has had progressive proximal muscle weakness since then    After his evaluation he was noted to have lymphadenopathy and was referred to Dr Carol Enamorado recommended inpatient evaluation for his dysphagia   "58-year-old  male who in february 2017 reported myalgia, weakness more in the proximal than the distal muscles with atrophy that is getting worse over the past few months, difficulty swallowing for liquid, he had an EMG indicative of chronic myopathy process however no active myositis, he was evaluated at [facility] in [place] by Rheumatology and Neurology, workup was negative for serum protein electrophoresis, immunofixation, rheumatoid factor however CRP was elevated, CPK was low, vitamin B12, TSH, T4, REBECCA, HIV, CMV, DNA antibodies, aldolase, Sjogren antibodies, Boland antibodies, ACR H receptor with negative  He sed rate was elevated at 31, Abby-Barr virus IgG positive however the IgM was negative  He had EMG on November 2017 showed 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  He was evaluated by neurology at [facility] scheduled to have LP  And MRI of the cervical spine showed multilevel degenerative changes no evidence of spinal canal stenosis or cord impingement  CT scan of the chest abdomen and pelvis on June 2018 showed left paratracheal lymph nodes measuring 1 3 x 0 8 cm, left internal mammary lymph nodes measuring 1 3 x 0 9 cm, AP window lymph node measuring 1 4 x 1 2 cm, no evidence of pericardial effusion, no evidence of masses in the liver or the spleen or the pancreas   Borderline enlarged lymph nodes at the portal region  Blood work on April 2018 showed no evidence of anemia, leukopenia or leukocytosis, normal differential, aldolase 3 9, CPK 29, C-reactive protein 41 5, PTT 32   He told me he received his in the past corticosteroids with improvement he received also Plaquenil without improvement  In the past 2 months the patient start having dysphagia to solids, 20 lb weight loss in 2 months, night sweats, fatigue, muscle pain, right knee pain with effusion  He could not turn his head, reported fatigue in the afternoon  Quit smoking 35 years ago, he drinks alcohol intermittently"  Follows with Graciela any preceding [place] (for 2nd opinion) currently no diagnosis has been established after serological studies, EMG testing, muscle biopsy  He reports that his next evaluation will be a lumbar puncture with CSF studies at [facility] with neurologist Dr Marguerita Claude approximately 2 weeks  He is requesting at this evaluation be done while hospitalized here  Oral candidiasis   Assessment & Plan    Start a statin swish swallow  Gastroenterology to evaluate for possible EGD to evaluate for esophageal candidiasis  History of Present Illness     HPI:  Darlene Seen is a 61 y o  male who presents with progressive dysphagia to solids  The patient has a history of a proximal myopathy which at this point has been undiagnosed  He was following up with Dr  for room for lymphadenopathy who recommended an inpatient evaluation for dysphagia  The patient has been seen Dr Marguerita Claude neurologist at [facility] to evaluate his myopathy  He reports dysphagia with solids such as pills and meats  Reports having to take liquids with solids to help assist with swallowing  He denies any regurgitation  Denies any dysphagia with liquids  He reports a 30 lb weight loss over past year  Denies any change in appetite  He also reports that he has developed a new onset of right knee pain with swelling  He does report having night sweats  He has not taken his temperature at home  Review of Systems   Constitutional: Positive for fatigue, fever and unexpected weight change  Negative for appetite change  HENT: Positive for trouble swallowing  Negative for sore throat  Respiratory: Negative for shortness of breath  Cardiovascular: Negative for chest pain and leg swelling  Gastrointestinal: Negative for abdominal pain, constipation, diarrhea, nausea and vomiting  Musculoskeletal: Positive for arthralgias, back pain, joint swelling, myalgias, neck pain and neck stiffness     Skin: Negative for rash        Historical Information   Past Medical History:   Diagnosis Date    Leg pain     bilateral leg pain (chronic)    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    Neoplasm of skin     lasr assessed 5/14/13, 5/25/15     Past Surgical History:   Procedure Laterality Date    ROTATOR CUFF REPAIR      ROTATOR CUFF REPAIR Left 3/1/2018    Procedure: SHOULDER ARTHROSCOPY WITH ROTATOR CUFF REPAIR, SUBACROMIAL DECOMPRESSION, LIMITED SYNOVECTOMY;  Surgeon: Phi Hood MD;  Location: [address] [place] [address];  Service: Orthopedics    SHOULDER ARTHROSCOPY      2015    TONSILLECTOMY       Social History   History   Alcohol Use    Yes     Comment: social     History   Drug Use No     History   Smoking Status    Former Smoker    Types: Cigarettes    Quit date: 2/26/1986   Smokeless Tobacco    Never Used     Family History: non-contributory    Meds/Allergies   all medications and allergies reviewed  Allergies   Allergen Reactions    Azithromycin Rash and Hives       Objective   Vitals: Blood pressure 129/84, pulse 82, temperature (!) 96 8 °F (36 °C), temperature source Tympanic, resp  rate 18, SpO2 98 %  No intake or output data in the 24 hours ending 06/26/18 1334    Invasive Devices     Peripheral Intravenous Line            Peripheral IV 06/26/18 Left Antecubital less than 1 day          Epidural Line            Nerve Block Catheter 03/01/18 117 days                Physical Exam   Constitutional: He is oriented to person, place, and time  He appears well-developed and well-nourished  HENT:   Head: Normocephalic and atraumatic  Mouth/Throat: Oropharyngeal exudate present  Eyes: EOM are normal  Pupils are equal, round, and reactive to light  No scleral icterus  Neck: Neck supple  No JVD present  Cardiovascular: Normal rate and regular rhythm  Pulmonary/Chest: Effort normal  He has no wheezes  He has no rales  Abdominal: Soft  He exhibits no distension  There is no tenderness     Musculoskeletal: He exhibits edema and tenderness  Diffuse muscle weakness, 4/5, proximal muscles upper and lower extremities bilateral   Neurological: He is alert and oriented to person, place, and time  No cranial nerve deficit  Skin: Skin is warm  No rash noted  No erythema  Psychiatric: He has a normal mood and affect  His behavior is normal        Lab Results: I have personally reviewed pertinent reports  Imaging: I have personally reviewed pertinent reports  EKG, Pathology, and Other Studies: I have personally reviewed pertinent reports  Code Status: No Order  Advance Directive and Living Will:      Power of :    POLST:      Counseling / Coordination of Care  Total floor / unit time spent today 60 minutes  Greater than 50% of total time was spent with the patient and / or family counseling and / or coordination of care  A description of the counseling / coordination of care:  Discussed plan of care with the patient at bedside

## 2018-06-26 NOTE — ASSESSMENT & PLAN NOTE
Start a statin swish swallow  Gastroenterology to evaluate for possible EGD to evaluate for esophageal candidiasis

## 2018-06-26 NOTE — ED PROVIDER NOTES
History  Chief Complaint   Patient presents with    Weakness - Generalized     Pt presents with generalized weakness and weight loss  Pt sent here by ocologist     Weight Loss     This is an otherwise healthy 69-year-old male who was sent in by his hematologist to be admitted for multiple symptoms  For little less than the year, the patient has been complaining of a 25 lb unintentional weight loss, diffuse myalgias, diffuse weakness all of which have been worsening over this period of time  He reports myalgias and weakness more in the proximal than distal muscles with atrophy that has been getting worse over the past few months  Over the past few months as well, he has been experiencing difficulty swallowing especially with solids  The patient was initially by evaluated by neurologist in the [facility], but family states that he was not helping  So they went to [facility] for a 2nd opinion  The patient had an extensive workup at that time  Refer to Dr Emily Gonzalez note on 6/26 for the extensive workup and results  He had an MRI of the cervical spine which showed multilevel degenerative changes  He also had a CT scan of his chest, abdomen, and pelvis in June 2018 which showed left paratracheal lymph nodes measuring 1 3 x 0 8 cm, left internal mammary lymph nodes measuring 1 3 x 0 9 cm  The patient was placed on long-term steroids in the past which seem to alleviate some of his symptoms  The patient works as an   Family history of ovarian and breast cancer  No family history of autoimmune diseases  He was seen by Dr Nick Jones today who recommended that he be admitted to the hospital to be evaluated by GI, rheumatology, and Neurology  He attempted to direct admit the patient but no hospital beds were available  The patient was sent to the emergency department for evaluation    Besides the previously stated review of systems, denies fever/chills, nausea/vomiting, lightheadedness/dizziness, numbness, headache, change in vision, URI symptoms, chest pain, palpitations, shortness of breath, cough, flank pain, abdominal pain, diarrhea, hematochezia, melena, dysuria, hematuria  Prior to Admission Medications   Prescriptions Last Dose Informant Patient Reported? Taking? Acetaminophen (TYLENOL) 325 MG CAPS   Yes Yes   Sig: Take by mouth   Coenzyme Q10 60 MG CAPS   Yes Yes   Sig: Take 1 tablet by mouth   MAGNESIUM PO   Yes Yes   Sig: Take 1 tablet by mouth   Multiple Vitamin (MULTIVITAMIN) tablet   Yes Yes   Sig: Take 1 tablet by mouth daily   NON FORMULARY   Yes Yes   Sig: Marine collagen peptides ; liver anti oxidents 1 tab a m  PROBIOTIC PRODUCT PO   Yes Yes   Sig: Take 1 tablet by mouth   b complex vitamins capsule   Yes Yes   Sig: Take 1 capsule by mouth daily   cholecalciferol (VITAMIN D3) 1,000 units tablet   Yes Yes   Sig: Take 1,000 Units by mouth   saccharomyces boulardii (FLORASTOR) 250 mg capsule   Yes Yes   Sig: Take 250 mg by mouth every morning      Facility-Administered Medications: None       Past Medical History:   Diagnosis Date    Leg pain     bilateral leg pain (chronic)    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    Neoplasm of skin     lasr assessed 5/14/13, 5/25/15       Past Surgical History:   Procedure Laterality Date    ROTATOR CUFF REPAIR      ROTATOR CUFF REPAIR Left 3/1/2018    Procedure: SHOULDER ARTHROSCOPY WITH ROTATOR CUFF REPAIR, SUBACROMIAL DECOMPRESSION, LIMITED SYNOVECTOMY;  Surgeon: Franky Machuca MD;  Location: [address] [place] [address];  Service: Orthopedics    SHOULDER ARTHROSCOPY      2015    TONSILLECTOMY         Family History   Problem Relation Age of Onset    Hypertension Mother     Diabetes Father     Diabetes Other      I have reviewed and agree with the history as documented      Social History   Substance Use Topics    Smoking status: Former Smoker     Types: Cigarettes     Quit date: 2/26/1986    Smokeless tobacco: Never Used   Padmini Silence Alcohol use Yes      Comment: social        Review of Systems   Constitutional: Positive for fatigue and unexpected weight change  Negative for chills and fever  HENT: Negative for rhinorrhea, sore throat and trouble swallowing  Eyes: Negative for photophobia and visual disturbance  Respiratory: Negative for cough, chest tightness and shortness of breath  Cardiovascular: Negative for chest pain, palpitations and leg swelling  Gastrointestinal: Negative for abdominal pain, blood in stool, diarrhea, nausea and vomiting  Dysphagia   Endocrine: Negative for polyuria  Genitourinary: Negative for dysuria, flank pain and hematuria  Musculoskeletal: Positive for back pain, myalgias and neck pain  Skin: Negative for color change and rash  Allergic/Immunologic: Negative for immunocompromised state  Neurological: Positive for weakness  Negative for dizziness, light-headedness, numbness and headaches  All other systems reviewed and are negative  Physical Exam  ED Triage Vitals [06/26/18 1059]   Temperature Pulse Respirations Blood Pressure SpO2   (!) 96 8 °F (36 °C) 80 17 132/79 99 %      Temp Source Heart Rate Source Patient Position - Orthostatic VS BP Location FiO2 (%)   Tympanic Monitor Sitting Left arm --      Pain Score       4           Orthostatic Vital Signs  Vitals:    06/26/18 1059 06/26/18 1115 06/26/18 1145   BP: 132/79 125/82 137/80   Pulse: 80 78 76   Patient Position - Orthostatic VS: Sitting Sitting Sitting       Physical Exam   Constitutional: Vital signs are normal  He appears well-developed and well-nourished  He is cooperative  No distress  HENT:   Mouth/Throat: Uvula is midline and oropharynx is clear and moist    Eyes: Conjunctivae, EOM and lids are normal  Pupils are equal, round, and reactive to light  Neck: Trachea normal  No thyroid mass and no thyromegaly present     Cardiovascular: Normal rate, regular rhythm, normal heart sounds, intact distal pulses and normal pulses  No murmur heard  Pulmonary/Chest: Effort normal and breath sounds normal    Abdominal: Soft  Normal appearance and bowel sounds are normal  There is no tenderness  There is no rebound, no guarding, no CVA tenderness and negative Edward's sign  Neurological: He is alert  Skin: Skin is warm, dry and intact  Psychiatric: He has a normal mood and affect  His speech is normal and behavior is normal  Thought content normal        ED Medications  Medications   sodium chloride 0 9 % bolus 1,000 mL (1,000 mL Intravenous New Bag 6/26/18 1123)       Diagnostic Studies  Results Reviewed     Procedure Component Value Units Date/Time    Comprehensive metabolic panel [[id number]]  (Abnormal) Collected:  06/26/18 1122    Lab Status:  Final result Specimen:  Blood from Arm, Left Updated:  06/26/18 1159     Sodium 137 mmol/L      Potassium 4 2 mmol/L      Chloride 101 mmol/L      CO2 31 mmol/L      Anion Gap 5 mmol/L      BUN 15 mg/dL      Creatinine 0 68 mg/dL      Glucose 79 mg/dL      Calcium 10 0 mg/dL      AST 21 U/L      ALT 30 U/L      Alkaline Phosphatase 113 U/L      Total Protein 8 7 (H) g/dL      Albumin 3 5 g/dL      Total Bilirubin 0 41 mg/dL      eGFR 105 ml/min/1 73sq m     Narrative:         National Kidney Disease Education Program recommendations are as follows:  GFR calculation is accurate only with a steady state creatinine  Chronic Kidney disease less than 60 ml/min/1 73 sq  meters  Kidney failure less than 15 ml/min/1 73 sq  meters      CK (with reflex to MB) [[id number]]  (Abnormal) Collected:  06/26/18 1122    Lab Status:  Final result Specimen:  Blood from Arm, Left Updated:  06/26/18 1159     Total CK 35 (L) U/L     CBC and differential [[id number]]  (Abnormal) Collected:  06/26/18 1122    Lab Status:  Final result Specimen:  Blood from Arm, Left Updated:  06/26/18 1140     WBC 11 03 (H) Thousand/uL      RBC 4 48 Million/uL      Hemoglobin 13 0 g/dL      Hematocrit 41 5 %      MCV 93 fL MCH 29 0 pg      MCHC 31 3 (L) g/dL      RDW 13 2 %      MPV 9 5 fL      Platelets 017 (H) Thousands/uL      nRBC 0 /100 WBCs      Neutrophils Relative 67 %      Immat GRANS % 1 %      Lymphocytes Relative 18 %      Monocytes Relative 12 %      Eosinophils Relative 1 %      Basophils Relative 1 %      Neutrophils Absolute 7 49 Thousands/µL      Immature Grans Absolute 0 08 Thousand/uL      Lymphocytes Absolute 1 97 Thousands/µL      Monocytes Absolute 1 29 (H) Thousand/µL      Eosinophils Absolute 0 15 Thousand/µL      Basophils Absolute 0 05 Thousands/µL     Sedimentation rate, automated [[id number]] Collected:  06/26/18 1122    Lab Status: In process Specimen:  Blood from Arm, Left Updated:  06/26/18 1138                 No orders to display         Procedures  Procedures      Phone Consults  ED Phone Contact    ED Course  ED Course as of Jun 26 1233   Tue Jun 26, 2018   1157 WBC: (!) 11 03   1157 Hemoglobin: 13 0   1157 Platelets: (!) 168   1201 Total CK: (!) 35   1201 Sodium: 137   1201 Potassium: 4 2   1201 BUN: 15   1201 Creatinine: 0 68   1201 Glucose: 79   1201 AST: 21   1201 Alkaline Phosphatase: 113   1201 Total Bilirubin: 0 41   1201 eGFR: 105                               MDM  Number of Diagnoses or Management Options  Diagnosis management comments: I will check lab work  I will ultimately admit the patient      CritCare Time    Disposition  Final diagnoses:   Weakness   Dysphagia   Myalgia   Weight loss     Time reflects when diagnosis was documented in both MDM as applicable and the Disposition within this note     Time User Action Codes Description Comment    6/26/2018 12:32 PM Zeynep Saver Add [R53 1] Weakness     6/26/2018 12:32 PM Zeynep Saver Add [R13 10] Dysphagia     6/26/2018 12:32 PM Zeynep Saver Add [M79 1] Myalgia     6/26/2018 12:32 PM Mario LARSEN Add [R63 4] Weight loss       ED Disposition     ED Disposition Condition Comment    Admit  Case was discussed with Dr Rajat Barbosa and the patient's admission status was agreed to be Admission Status: inpatient status to the service of [facility]  Follow-up Information    None         Patient's Medications   Discharge Prescriptions    No medications on file     No discharge procedures on file  ED Provider  Attending physically available and evaluated Kleber   I managed the patient along with the ED Attending      Electronically Signed by         Ramona Faith MD  06/26/18 9000

## 2018-06-26 NOTE — ASSESSMENT & PLAN NOTE
CT scan of the chest abdomen and pelvis on June 2018 showed left paratracheal lymph nodes measuring 1 3 x 0 8 cm, left internal mammary lymph nodes measuring 1 3 x 0 9 cm, AP window lymph node measuring 1 4 x 1 2 cm, no evidence of pericardial effusion, no evidence of masses in the liver or the spleen or the pancreas   Borderline enlarged lymph nodes at the portal region  At this time lymph nodes are likely too small to biopsy  Likely follow up with Oncology as an outpatient to arrange PET scan to evaluate for abnormal metabolic activity

## 2018-06-26 NOTE — ASSESSMENT & PLAN NOTE
New onset  Denies any history gout or injury to the right knee  He will likely require an arthrocentesis with fluid studies to evaluate infectious versus inflammatory arthritis  Consult Orthopedics for aspiration

## 2018-06-27 ENCOUNTER — DOCUMENTATION (OUTPATIENT)
Dept: HEMATOLOGY ONCOLOGY | Facility: CLINIC | Age: 58
End: 2018-06-27

## 2018-06-27 ENCOUNTER — APPOINTMENT (INPATIENT)
Dept: RADIOLOGY | Facility: HOSPITAL | Age: 58
DRG: 092 | End: 2018-06-27
Payer: COMMERCIAL

## 2018-06-27 PROCEDURE — 99253 IP/OBS CNSLTJ NEW/EST LOW 45: CPT | Performed by: ORTHOPAEDIC SURGERY

## 2018-06-27 PROCEDURE — 99233 SBSQ HOSP IP/OBS HIGH 50: CPT | Performed by: INTERNAL MEDICINE

## 2018-06-27 PROCEDURE — G8987 SELF CARE CURRENT STATUS: HCPCS

## 2018-06-27 PROCEDURE — 97535 SELF CARE MNGMENT TRAINING: CPT

## 2018-06-27 PROCEDURE — G8988 SELF CARE GOAL STATUS: HCPCS

## 2018-06-27 PROCEDURE — 97166 OT EVAL MOD COMPLEX 45 MIN: CPT

## 2018-06-27 PROCEDURE — 99253 IP/OBS CNSLTJ NEW/EST LOW 45: CPT | Performed by: INTERNAL MEDICINE

## 2018-06-27 PROCEDURE — 92610 EVALUATE SWALLOWING FUNCTION: CPT

## 2018-06-27 PROCEDURE — G8989 SELF CARE D/C STATUS: HCPCS

## 2018-06-27 PROCEDURE — 73560 X-RAY EXAM OF KNEE 1 OR 2: CPT

## 2018-06-27 RX ADMIN — ENOXAPARIN SODIUM 40 MG: 100 INJECTION SUBCUTANEOUS at 14:56

## 2018-06-27 RX ADMIN — NYSTATIN 500000 UNITS: 100000 SUSPENSION ORAL at 12:37

## 2018-06-27 RX ADMIN — NYSTATIN 500000 UNITS: 100000 SUSPENSION ORAL at 08:09

## 2018-06-27 RX ADMIN — NYSTATIN 500000 UNITS: 100000 SUSPENSION ORAL at 17:40

## 2018-06-27 RX ADMIN — NYSTATIN 500000 UNITS: 100000 SUSPENSION ORAL at 21:51

## 2018-06-27 NOTE — ASSESSMENT & PLAN NOTE
· New onset, nontraumatic, unknown etiology  Recently had right ankle swelling that resolved     · Appreciate ortho input, s/p joint aspiration 6/26, no crystals seen  · R knee XR negative

## 2018-06-27 NOTE — CONSULTS
Consultation - [facility]  Liban Bray 61 y o  male MRN: [id number]  Unit/Bed#: [facility] 627-01 Encounter: [id number]        Inpatient consult to gastroenterology  Consult performed by: Tanya Meyers ordered by: Luc Cuevas          Reason for Consult / Principal Problem: dysphagia       ASSESSMENT AND PLAN:      Esophageal Dysphagia  -Clear by speech, no oropharyngeal dysfunction  -3 months of intermittent symptoms with solids and pills, no symptoms of GERD, no issues with liquids  -Will plan for EGD to rule out obstructing lesion, ring, EOE, possibly secondary to myopathy- can plan for this tomorrow  NPO after midnight  -Continue diet as tolerated    Unintentional weight loss  Muscle Weakness  Lymphadenopathy  -Ongoing workup in [place], prior inconclusive muscle biopsy  -CT at [facility] without mass visualized, with paratracheal, internal mammary, and ap window LAD  -Oncology following, to consider bronch guided EUS with biopsy  ______________________________________________________________________    HPI:  Liban Bray is a 61year old male who presents with 30lb weight loss, fatigue and muscle weakness/wasting over the last year  He has joint and muscle pain, Night sweats over the last 3 months  These symptoms have been present over the last year and he has workup at [facility], cause curerntly unclear  CT at [facility] showed left paratracheal lymph nodes and left internal mammary lymph nodes, ap window lymph node enlargement  No evidence of masses  He had a right knee effusion and fluid was drained    GI is consulted for dysphagia  He states that over the last 3 months he has developed intermittent dysphagia, particularly for large pills, intermittently for solids such as hamburger  He has to swallow multiple times and then the food will go down  It occurs with the pills daily, with food it is intermittent  It has not been progressive  He ednies odynophagia   He has not had difficulty with liquids  He denies GERD  No abdominal pain, nausea, or vomiting  He denies diarrhea, blood in stool, constipation  He ates 3 meals daily and has a healthy appetite  He quit smoking 30 years ago  He has never had an EGD, he has had multiple colonoscopy most recently was 3 months ago with [facility], he has a history of colonic polyps  He was evalauted by speech who recommended regular diet and thin liquids and GI evaluation  He does state he has had some hoarseness of his voice over the last few months      REVIEW OF SYSTEMS:    CONSTITUTIONAL: Positive for night sweats and weight loss   HEENT: No earache or tinnitus  Denies hearing loss or visual disturbances  CARDIOVASCULAR: No chest pain or palpitations  RESPIRATORY: Denies any cough, hemoptysis, shortness of breath or dyspnea on exertion  GASTROINTESTINAL: As noted in the History of Present Illness  GENITOURINARY: No problems with urination  Denies any hematuria or dysuria  NEUROLOGIC: No dizziness or vertigo, denies headaches  MUSCULOSKELETAL: Piositive for joint pain and muscle pain   SKIN: Denies skin rashes or itching  ENDOCRINE: Denies excessive thirst  Denies intolerance to heat or cold  PSYCHOSOCIAL: Denies depression or anxiety  Denies any recent memory loss         Historical Information   Past Medical History:   Diagnosis Date    Leg pain     bilateral leg pain (chronic)    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    Neoplasm of skin     lasr assessed 5/14/13, 5/25/15     Past Surgical History:   Procedure Laterality Date    ROTATOR CUFF REPAIR      ROTATOR CUFF REPAIR Left 3/1/2018    Procedure: SHOULDER ARTHROSCOPY WITH ROTATOR CUFF REPAIR, SUBACROMIAL DECOMPRESSION, LIMITED SYNOVECTOMY;  Surgeon: Christiano Tang MD;  Location: [address] [place] [address];  Service: Orthopedics    SHOULDER ARTHROSCOPY      2015    TONSILLECTOMY       Social History   History   Alcohol Use    0 6 oz/week    1 Standard drinks or equivalent per week     Comment: social vodka     History   Drug Use No     History   Smoking Status    Former Smoker    Packs/day: 1 00    Years: 15 00    Types: Cigarettes    Quit date: 2/26/1986   Smokeless Tobacco    Never Used     Family History   Problem Relation Age of Onset    Hypertension Mother     Diabetes Father     Arthritis Father     Diabetes Other        Meds/Allergies     Prescriptions Prior to Admission   Medication    Acetaminophen (TYLENOL) 325 MG CAPS    b complex vitamins capsule    cholecalciferol (VITAMIN D3) 1,000 units tablet    Coenzyme Q10 60 MG CAPS    MAGNESIUM PO    Multiple Vitamin (MULTIVITAMIN) tablet    PROBIOTIC PRODUCT PO    saccharomyces boulardii (FLORASTOR) 250 mg capsule     Current Facility-Administered Medications   Medication Dose Route Frequency    acetaminophen (TYLENOL) tablet 650 mg  650 mg Oral Q6H PRN    lidocaine (PF) (XYLOCAINE-MPF) 1 % injection 10 mL  10 mL Infiltration Once    magnesium hydroxide (MILK OF MAGNESIA) 400 mg/5 mL oral suspension 30 mL  30 mL Oral Daily PRN    nystatin (MYCOSTATIN) oral suspension 500,000 Units  500,000 Units Swish & Swallow 4x Daily    ondansetron (ZOFRAN) injection 4 mg  4 mg Intravenous Q6H PRN       Allergies   Allergen Reactions    Azithromycin Rash and Hives           Objective     Blood pressure 106/64, pulse 80, temperature 98 3 °F (36 8 °C), temperature source Oral, resp  rate 18, height 5' 7" (1 702 m), weight 66 2 kg (146 lb), SpO2 97 %  Body mass index is 22 87 kg/m²        Intake/Output Summary (Last 24 hours) at 06/27/18 1143  Last data filed at 06/27/18 0300   Gross per 24 hour   Intake             1370 ml   Output                0 ml   Net             1370 ml         PHYSICAL EXAM:      General Appearance:   Alert, cooperative, no distress   HEENT:   Normocephalic, atraumatic, anicteric      Neck:  Supple, symmetrical, trachea midline   Lungs:   Clear to auscultation bilaterally   Heart[de-identified]   Regular rate and rhythm; no murmur, rub, or gallop     Abdomen:   Soft, non-tender, non-distended; normal bowel sounds   Genitalia:   Deferred    Rectal:   Deferred    Extremities:  No cyanosis, clubbing or edema, wrapped R knee s/p aspiration    Pulses:  2+ and symmetric all extremities    Skin:  No jaundice, rashes, or lesions    Lymph nodes:  No palpable cervical lymphadenopathy        Lab Results:   Admission on 06/26/2018   Component Date Value    WBC 06/26/2018 11 03*    RBC 06/26/2018 4 48     Hemoglobin 06/26/2018 13 0     Hematocrit 06/26/2018 41 5     MCV 06/26/2018 93     MCH 06/26/2018 29 0     MCHC 06/26/2018 31 3*    RDW 06/26/2018 13 2     MPV 06/26/2018 9 5     Platelets 05/95/0556 426*    nRBC 06/26/2018 0     Neutrophils Relative 06/26/2018 67     Immat GRANS % 06/26/2018 1     Lymphocytes Relative 06/26/2018 18     Monocytes Relative 06/26/2018 12     Eosinophils Relative 06/26/2018 1     Basophils Relative 06/26/2018 1     Neutrophils Absolute 06/26/2018 7 49     Immature Grans Absolute 06/26/2018 0 08     Lymphocytes Absolute 06/26/2018 1 97     Monocytes Absolute 06/26/2018 1 29*    Eosinophils Absolute 06/26/2018 0 15     Basophils Absolute 06/26/2018 0 05     Sodium 06/26/2018 137     Potassium 06/26/2018 4 2     Chloride 06/26/2018 101     CO2 06/26/2018 31     Anion Gap 06/26/2018 5     BUN 06/26/2018 15     Creatinine 06/26/2018 0 68     Glucose 06/26/2018 79     Calcium 06/26/2018 10 0     AST 06/26/2018 21     ALT 06/26/2018 30     Alkaline Phosphatase 06/26/2018 113     Total Protein 06/26/2018 8 7*    Albumin 06/26/2018 3 5     Total Bilirubin 06/26/2018 0 41     eGFR 06/26/2018 105     Total CK 06/26/2018 35*    Sed Rate 06/26/2018 83*    Gram Stain Result 06/26/2018 3+ Polys     Gram Stain Result 06/26/2018 No bacteria seen     RBC,SYNOVIAL 06/26/2018 84*    WBC, Fluid 06/26/2018 35003*    Crystals, Synovial Fluid 06/26/2018 No Crystals Seen     Total Counted 06/26/2018 100     Neutrophil % Synovial 06/26/2018 76     Lymph % Synovial 06/26/2018 19     Monocyte % Synovial 06/26/2018 5        Imaging Studies: I have personally reviewed pertinent imaging studies

## 2018-06-27 NOTE — PROGRESS NOTES
LSW reviewed pts distress thermometer completed by pt on 6/16/2018 in med onc at hospitals  Pt rated their distress a 4/10 and denied psychosocial problems  Based on pts score, a social work follow up would not be indicated  If pt expresses psychosocial needs with their oncology care, LSW is available to provide cancer care counseling

## 2018-06-27 NOTE — ASSESSMENT & PLAN NOTE
· CT scan of the chest abdomen and pelvis on June 2018 showed left paratracheal lymph nodes measuring 1 3 x 0 8 cm, left internal mammary lymph nodes measuring 1 3 x 0 9 cm, AP window lymph node measuring 1 4 x 1 2 cm, no evidence of pericardial effusion, no evidence of masses in the liver or the spleen or the pancreas  Borderline enlarged lymph nodes at the portal region  · Discussed with heme/onc, consider EBUS pending results of EGD tomorrow  · Believes Dr Raisa Sharp has discs of images   If unable to access these images may need to repeat ct scan  · Appreciate oncology input

## 2018-06-27 NOTE — PROGRESS NOTES
Orthopedics   Kang Spencer 61 y o  male MRN: [id number]  Unit/Bed#: [facility] 627-01      S: 57M R knee pain, symptoms improved since aspiration yesterday  Patient has less pain when bending his right knee and improved improved right knee range of motion  No numbness/tingling right lower extremity      Labs:    0  Lab Value Date/Time   HCT 41 5 06/26/2018 1122   HCT 41 2 02/01/2018 0811   HCT 44 0 05/01/2017 0727   HCT 46 7 05/24/2016 0954   HGB 13 0 06/26/2018 1122   HGB 14 0 02/01/2018 0811   HGB 14 8 05/01/2017 0727   HGB 16 0 05/24/2016 0954   INR 1 0 02/01/2018 0811   WBC 11 03 (H) 06/26/2018 1122   WBC 10 5 05/01/2017 0727   WBC 9 4 05/24/2016 0954   ESR 83 (H) 06/26/2018 1122   ESR 10 05/01/2017 0727       Meds:    Current Facility-Administered Medications:     acetaminophen (TYLENOL) tablet 650 mg, 650 mg, Oral, Q6H PRN, Vinod Minor MD    lidocaine (PF) (XYLOCAINE-MPF) 1 % injection 10 mL, 10 mL, Infiltration, Once, Juni Green MD    magnesium hydroxide (MILK OF MAGNESIA) 400 mg/5 mL oral suspension 30 mL, 30 mL, Oral, Daily PRN, Vinod Minor MD    nystatin (MYCOSTATIN) oral suspension 500,000 Units, 500,000 Units, Swish & Swallow, 4x Daily, Vinod Minor MD, 500,000 Units at 06/27/18 0809    ondansetron (ZOFRAN) injection 4 mg, 4 mg, Intravenous, Q6H PRN, Vinod Minor MD    Blood Culture:   No results found for: BLOODCX    Wound Culture:   No results found for: WOUNDCULT    Ins and Outs:  I/O last 24 hours:   In: 1370 [P O :370; IV Piggyback:1000]  Out: 0           Physical Exam:   /64   Pulse 80   Temp 98 3 °F (36 8 °C) (Oral)   Resp 18   Ht 5' 7" (1 702 m)   Wt 66 2 kg (146 lb)   SpO2 97%   BMI 22 87 kg/m²   Gen: Alert and oriented to person, place, time  Musculoskeletal: right lower extremity  · Skin intact, no effusion  · Can perform straight leg raise  · Stable to varus/valgus stress  · Negative lachmans, Posterior draw  · Sensation intact L2-S1  · 5/5 strength to hip flexion/extension, knee flexion/extension ankle dorsi/plantar flexion, EHL/FHL  · 2+ DP pulse    Radiology:   No films    Labs:   R knee synovial fluid from 6/26: 10k wbcs, no crystals, no bacteria seen on GS    Assessment:  57 y o male with right knee pain    Plan:   · Weight bearing as tolerated  right lower extremity  · R knee xray today  · PT  · Pain control  · Dispo: Ortho will follow    Hilaria Eldridge MD  9:49 AM

## 2018-06-27 NOTE — SPEECH THERAPY NOTE
Speech Language/Pathology  Speech/Language Pathology  Assessment    Patient Name: Albania De La Cruz  Today's Date: 6/27/2018     Problem List  Patient Active Problem List   Diagnosis    Abnormal bleeding time    Actinic keratosis    Left rotator cuff tear    Muscle atrophy    Myalgia    Transient disorder of initiating or maintaining sleep    Weakness of left arm    Encounter for monitoring of hydroxychloroquine therapy    Pre-op examination    Preoperative clearance    Dysphagia    Lymphadenopathy    Knee effusion, right    Oral candidiasis     Past Medical History  Past Medical History:   Diagnosis Date    Leg pain     bilateral leg pain (chronic)    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    Neoplasm of skin     lasr assessed 5/14/13, 5/25/15     Past Surgical History  Past Surgical History:   Procedure Laterality Date    ROTATOR CUFF REPAIR      ROTATOR CUFF REPAIR Left 3/1/2018    Procedure: SHOULDER ARTHROSCOPY WITH ROTATOR CUFF REPAIR, SUBACROMIAL DECOMPRESSION, LIMITED SYNOVECTOMY;  Surgeon: Lorri Ayala MD;  Location: [address] [place] [address];  Service: Orthopedics    SHOULDER ARTHROSCOPY      2015    TONSILLECTOMY       Per chart review: Albania De La Cruz is a 61 y o  male who presents with progressive dysphagia to solids  The patient has a history of a proximal myopathy which at this point has been undiagnosed  He was following up with Dr  for room for lymphadenopathy who recommended an inpatient evaluation for dysphagia  The patient has been seen Dr Karlo Stahl neurologist at [facility] to evaluate his myopathy  He reports dysphagia with solids such as pills and meats  Reports having to take liquids with solids to help assist with swallowing  He denies any regurgitation  Denies any dysphagia with liquids  He reports a 30 lb weight loss over past year  Denies any change in appetite  He also reports that he has developed a new onset of right knee pain with swelling   He does report having night sweats  He has not taken his temperature at home  Summary:  Pt presents w/ functional oropharyngeal swallow skills for regular diet c thin liquids c no overt s/s penetration/aspiration  Pt reports some difficulty when swallowing meats, breads and pills requiring him to do two swallows at at times use a liquid wash to get it down  Pt observed eating pancakes c syrup, a hard "Kind" bar and thin Gatorade  No difficulties noted with these trials  Provided education to pt on choosing softer solids and requesting extra gravy/sauce when ordering meats/dry foods  Discussed avoiding harder raw vegetables and utilizing increased mastication and alternating liquid/solids when eating  Pt is cognitively intact and able to independently choose softer foods, ok for regular diet c thin liquids  Based on symptoms, ? Some degree of esophageal dysphagia  Of note, pt with complaints of neck stiffness/muscle tightening c limited range of motion  ? If pt also has some degree of tightening of the cricopharyngeus muscle affecting opening/passage of food during the swallow  Will defer to GI for further work up/assessment  Recommendations:  Diet: regular  Liquid: thin  Meds: whole/crush as tolerated  Supervision: assist as needed  Positioning:Upright  Strategies: Pt to take PO/Meds only when fully alert and upright  Oral care: encourage twice daily brushing    Eval only, No f/u tx indicated  Consider consult w/:  GI    Reason for consult:  R/o aspiration  Determine safest and least restrictive diet  C/o pill dysphagia  C/o solid food dysphagia    Current diet:  Level 2/thin  Premorbid diet[de-identified]  Regular/thin  Previous VBS:  No  O2 requirement:  RA  Voice/Speech:  Reports mild increase in hoarseness/speech WNL  Social:  Lives at home  Follows commands:   Yes  Cognitive Status:  Intact  Oral mech exam:  Full dentition  Full symmetry    Items administered:  soft solid, hard solid, thin liquids  Liquids were taken by cup       Oral stage:  Lip closure: completed  Mastication: adequate  Bolus formation: adequate  Bolus control: adequate  Transfer: prompt  Oral residue: no  Pocketing: no    Pharyngeal stage:  Swallow promptness: prompt  Laryngeal rise: adequate  Wet voice: no  Throat clear: no  Cough: no  Secondary swallows: no  Audible swallows: no  No s/s aspiration    Esophageal stage:  C/o sticking c meats/breads/pills    Results d/w:  Pt, nursing, family, physician

## 2018-06-27 NOTE — ASSESSMENT & PLAN NOTE
· Start nystatin swish swallow  · Gastroenterology input appreciated, EGD to evaluate for esophageal candidiasis

## 2018-06-27 NOTE — PROGRESS NOTES
Progress Note - Darlene Seen 1960, 61 y o  male MRN: [id number]  [facility] Encounter: [id number] DOS: 6/27/18  Primary Care Provider: Jenise Rivas DO   Date and time admitted to hospital: 6/26/2018 11:01 AM    Muscle atrophy   Assessment & Plan    · Starting approximately February of 2017 after a flu-like illness  Reported myalgia, weakness more in the proximal than the distal muscles with atrophy that is getting worse over the past few months, difficulty swallowing  He states that he took azithromycin and had an allergic reaction, consisting of a rash, and has had progressive proximal muscle weakness since then  After his evaluation he was noted to have lymphadenopathy and was referred to Dr Morey Shone  · He was evaluated at [facility] in [place] by Rheumatology and Neurology, workup was negative for serum protein electrophoresis, immunofixation, rheumatoid factor however CRP was elevated, CPK was low, vitamin B12, TSH, T4, REBECCA, HIV, CMV, DNA antibodies, aldolase, Sjogren antibodies, Boland antibodies, ACR H receptor with negative  sed rate was elevated at 31, Abby-Barr virus IgG positive however the IgM was negative  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 [facility]  · He had EMG x 2 showed 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, 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   · MRI of the cervical spine showed multilevel degenerative changes no evidence of spinal canal stenosis or cord impingement  · CT scan of the chest abdomen and pelvis on June 2018 showed left paratracheal lymph nodes measuring 1 3 x 0 8 cm, left internal mammary lymph nodes measuring 1 3 x 0 9 cm, AP window lymph node measuring 1 4 x 1 2 cm, no evidence of pericardial effusion, no evidence of masses in the liver or the spleen or the pancreas   Borderline enlarged lymph nodes at the portal region  · Has received corticosteroids with improvement and Plaquenil without improvement  · muscle biopsy inconclusive   · Follows with [facility] in [place] currently no diagnosis has been established   · He reports that his next evaluation will be a lumbar puncture with CSF studies with neurologist Dr Kayla Neff this Friday  Consider inpatient if not discharged before then  (however he states she wants him to get all dx studies at [address])  · Previously evaluated by Rheumatology Dr America Woods? 6-8 mo ago who recommend neuro eval, patient and family requesting rheum eval         * Dysphagia   Assessment & Plan    · Esophageal dysphagia x 3 months of intermittent sx with solids and pills, no regurgitation, no issue with liquids   · Difficulty swallowing solids and has to drink liquids assist with swallowing     · Likely secondary to his unspecified myopathy versus Candida esophagitis  · Cleared by speech for reg diet, no oropharyngeal dysfunction, recommend reg diet with thins  · Continue nystatin swish and swallow  · Plan for EGD to r/o obstruction lesion, ring, EOE for tomorrow   · NPO at midnight         Lymphadenopathy   Assessment & Plan    · CT scan of the chest abdomen and pelvis on June 2018 showed left paratracheal lymph nodes measuring 1 3 x 0 8 cm, left internal mammary lymph nodes measuring 1 3 x 0 9 cm, AP window lymph node measuring 1 4 x 1 2 cm, no evidence of pericardial effusion, no evidence of masses in the liver or the spleen or the pancreas  Borderline enlarged lymph nodes at the portal region  · Discussed with heme/onc, consider EBUS pending results of EGD tomorrow  · Believes Dr Ruben Abad has discs of images  If unable to access these images may need to repeat ct scan  · Appreciate oncology input         Knee effusion, right   Assessment & Plan    · New onset, nontraumatic, unknown etiology  Recently had right ankle swelling that resolved     · Appreciate ortho input, s/p joint aspiration 6/26, no crystals seen  · R knee XR negative         Oral candidiasis   Assessment & Plan    · Start nystatin swish swallow  · Gastroenterology input appreciated, EGD to evaluate for esophageal candidiasis  VTE Pharmacologic Prophylaxis:   Pharmacologic: Enoxaparin (Lovenox)  Mechanical VTE Prophylaxis in Place: Yes    Patient Centered Rounds: I have performed bedside rounds with nursing staff today  Discussions with Specialists or Other Care Team Provider: nursing     Education and Discussions with Family / Patient: patient, called dtr     Time Spent for Care: 45 minutes  More than 50% of total time spent on counseling and coordination of care as described above  Current Length of Stay: 1 day(s)    Current Patient Status: Inpatient   Certification Statement: The patient will continue to require additional inpatient hospital stay due to EGD tomorrow, possible biopsy of lymph nodes and lumbar puncture     Discharge Plan / Estimated Discharge Date: pending clinical course     Code Status: Level 1 - Full Code    Subjective:   Pt seen and examined at bedside  Pain controlled  Swallowing solids with help from liquids  Knee pain improved  Objective:     Vitals:   Temp (24hrs), Av 4 °F (36 9 °C), Min:98 2 °F (36 8 °C), Max:98 7 °F (37 1 °C)    HR:  [70-82] 80  Resp:  [17-18] 18  BP: (106-128)/(55-80) 106/64  SpO2:  [97 %] 97 %  Body mass index is 22 87 kg/m²  Input and Output Summary (last 24 hours): Intake/Output Summary (Last 24 hours) at 18 1352  Last data filed at 18 1033   Gross per 24 hour   Intake             1730 ml   Output                0 ml   Net             1730 ml     Physical Exam:     Physical Exam   Constitutional: He is oriented to person, place, and time  He appears well-developed and well-nourished  HENT:   Head: Normocephalic and atraumatic  Mouth/Throat: Oropharynx is clear and moist    Eyes: EOM are normal  No scleral icterus  Neck: Normal range of motion   Neck supple  Difficulty turning head to right, hoarseness of voice   Cardiovascular: Normal rate, regular rhythm and normal heart sounds  No murmur heard  Pulmonary/Chest: Effort normal and breath sounds normal    Abdominal: Soft  Bowel sounds are normal    Musculoskeletal: Normal range of motion  He exhibits no edema  Proximal muscle atrophy b/l UE and LE   Neurological: He is alert and oriented to person, place, and time  Skin: Skin is warm and dry  Psychiatric: He has a normal mood and affect  Nursing note and vitals reviewed  Additional Data:     Labs:       Results from last 7 days  Lab Units 06/26/18  1122   WBC Thousand/uL 11 03*   HEMOGLOBIN g/dL 13 0   HEMATOCRIT % 41 5   PLATELETS Thousands/uL 426*   NEUTROS PCT % 67   LYMPHS PCT % 18   MONOS PCT % 12   EOS PCT % 1       Results from last 7 days  Lab Units 06/26/18  1122   SODIUM mmol/L 137   POTASSIUM mmol/L 4 2   CHLORIDE mmol/L 101   CO2 mmol/L 31   BUN mg/dL 15   CREATININE mg/dL 0 68   CALCIUM mg/dL 10 0   TOTAL PROTEIN g/dL 8 7*   BILIRUBIN TOTAL mg/dL 0 41   ALK PHOS U/L 113   ALT U/L 30   AST U/L 21   GLUCOSE RANDOM mg/dL 79           * I Have Reviewed All Lab Data Listed Above  * Additional Pertinent Lab Tests Reviewed:  Jese Elizabeth Admission Reviewed    Imaging:    Imaging Reports Reviewed Today Include: all   Imaging Personally Reviewed by Myself Includes:  None     Recent Cultures (last 7 days):       Results from last 7 days  Lab Units 06/26/18  1638   GRAM STAIN RESULT  3+ Polys  No bacteria seen   BODY FLUID CULTURE, STERILE  No growth       Last 24 Hours Medication List:     Current Facility-Administered Medications:  acetaminophen 650 mg Oral Q6H PRN Vasile Peacock MD   enoxaparin 40 mg Subcutaneous Q24H [facility] Brock Chapa PA-C   lidocaine (PF) 10 mL Infiltration Once Annemarie Garcia MD   magnesium hydroxide 30 mL Oral Daily PRN Vasile Peacock MD   nystatin 500,000 Units Swish & Swallow 4x Daily Vasile Rad, MD   ondansetron 4 mg Intravenous Q6H PRN Linda Solis MD        Today, Patient Was Seen By: Ines Madison PA-C    ** Please Note: This note has been constructed using a voice recognition system   **

## 2018-06-27 NOTE — PLAN OF CARE
Problem: SLP ADULT - SWALLOWING, IMPAIRED  Goal: Initial SLP swallow eval performed  Outcome: Completed Date Met: 06/27/18

## 2018-06-27 NOTE — CASE MANAGEMENT
Initial Clinical Review    Thank you,  [facility] in the [facility] by Donal Vega for 2017  Network Utilization Review Department  Phone: [phone]; Fax [phone]  ATTENTION: The Network Utilization Review Department is now centralized for our 7 Facilities  Make a note that we have a new phone and fax numbers for our Department  Please call with any questions or concerns to [phone] and carefully follow the prompts so that you are directed to the right person  All voicemails are confidential  Fax any determinations, approvals, denials, and requests for initial or continue stay review clinical to [phone]  Due to HIGH CALL volume, it would be easier if you could please send faxed requests to expedite your requests and in part, help us provide discharge notifications faster  Admission: Date/Time/Statement: 6/26/18 @ 1233     Orders Placed This Encounter   Procedures    Inpatient Admission (expected length of stay for this patient is greater than two midnights)     Standing Status:   Standing     Number of Occurrences:   1     Order Specific Question:   Admitting Physician     Answer:   Ness Lima [[id number]]     Order Specific Question:   Level of Care     Answer:   Med Surg [16]     Order Specific Question:   Estimated length of stay     Answer:   More than 2 Midnights     Order Specific Question:   Certification     Answer:   I certify that inpatient services are medically necessary for this patient for a duration of greater than two midnights  See H&P and MD Progress Notes for additional information about the patient's course of treatment           ED: Date/Time/Mode of Arrival:   ED Arrival Information     Expected Arrival Acuity Means of Arrival Escorted By Service Admission Type    6/26/2018 10:43 6/26/2018 10:54 Urgent Walk-In Self General Medicine Urgent    Arrival Complaint    weakness          Chief Complaint:   Chief Complaint   Patient presents with    Weakness - Generalized     Pt presents with generalized weakness and weight loss  Pt sent here by ocologist     Weight Loss       History of Illness: Kleber  is a 61 y o  male who presents with progressive dysphagia to solids  The patient has a history of a proximal myopathy which at this point has been undiagnosed  He was following up  for lymphadenopathy who recommended an inpatient evaluation for dysphagia  Reports having to take liquids with solids to help assist with swallowing  He reports a 30 lb weight loss over past year  Denies any change in appetite  He also reports that he has developed a new onset of right knee pain with swelling  He does report having night sweats       ED Vital Signs:   ED Triage Vitals [06/26/18 1059]   Temperature Pulse Respirations Blood Pressure SpO2   (!) 96 8 °F (36 °C) 80 17 132/79 99 % RA sat      Temp Source Heart Rate Source Patient Position - Orthostatic VS BP Location FiO2 (%)   Tympanic Monitor Sitting Left arm --      Pain Score       4        Wt Readings from Last 1 Encounters:   06/26/18 66 2 kg (146 lb)         Abnormal Labs/Diagnostic Test Results: 6/26 - Wbc 11 03 - Plt 426 - T Protein 8 7  Sed Rate 83  Synovial Fluid -  6/26 - WBC - 27272 - RBC 84 -  Gram stain - no bacteria seen - no crystals     XR right Knee  pending    ED Treatment:   Medication Administration from 06/26/2018 1043 to 06/26/2018 1501       Date/Time Order Dose Route     06/26/2018 1123 sodium chloride 0 9 % bolus 1,000 mL 1,000 mL Intravenous       Past Medical/Surgical History:   Past Medical History:   Diagnosis Date    Leg pain     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    Neoplasm of skin        Admitting Diagnosis: Myalgia [M79 1]  Lymphadenopathy [R59 1]  Weakness [R53 1]  Weight loss [R63 4]  Knee effusion, right [M25 461]  Dysphagia [R13 10]  Muscular atrophy, unspecified site [M62 50]    Age/Sex: 61 y o  male    Assessment/Plan:   Dysphagia   Assessment & Plan     New onset over the past few weeks  Difficulty swallowing solids and has to drink liquids assist with swallowing  Denies any dysphagia with liquids  Likely secondary to his unspecified myopathy versus Candida esophagitis  Check swallow evaluation  Start nystatin swish and swallow  Consult Gastroenterology for possible endoscopy           Knee effusion, right   Assessment & Plan     New onset  Denies any history gout or injury to the right knee  He will likely require an arthrocentesis with fluid studies to evaluate infectious versus inflammatory arthritis  Consult Orthopedics for aspiration           Lymphadenopathy   Assessment & Plan     CT scan of the chest abdomen and pelvis on June 2018 showed left paratracheal lymph nodes measuring 1 3 x 0 8 cm, left internal mammary lymph nodes measuring 1 3 x 0 9 cm, AP window lymph node measuring 1 4 x 1 2 cm, no evidence of pericardial effusion, no evidence of masses in the liver or the spleen or the pancreas   Borderline enlarged lymph nodes at the portal region  At this time lymph nodes are likely too small to biopsy  Likely follow up with Oncology as an outpatient to arrange PET scan to evaluate for abnormal metabolic activity           Muscle atrophy   Assessment & Plan     Starting approximately February of 2017 after a flu-like illness  He states that he took azithromycin and had an allergic reaction, consisting of a rash, and has had progressive proximal muscle weakness since then  After his evaluation he was noted to have lymphadenopathy and was referred to Dr Shiela Marin recommended inpatient evaluation for his dysphagia  "Follows with Graciela any preceding [place] (for 2nd opinion) currently no diagnosis has been established after serological studies, EMG testing, muscle biopsy  He reports that his next evaluation will be a lumbar puncture with CSF studies at [facility] with neurologist Dr Xochitl Box approximately 2 weeks    He is requesting this evaluation be done while hospitalized here           Oral candidiasis   Assessment & Plan     Start a statin swish swallow  Gastroenterology to evaluate for possible EGD to evaluate for esophageal candidiasis                Admission Orders:  Scheduled Meds:   Current Facility-Administered Medications:  acetaminophen 650 mg Oral Q6H PRN   lidocaine (PF) 10 mL Infiltration Once   magnesium hydroxide 30 mL Oral Daily PRN   nystatin 500,000 Units Swish & Swallow 4x Daily   ondansetron 4 mg Intravenous Q6H PRN     Nursing orders - VS q 4 - up and OOB as tolerated - Ambulate q shift - diet dysphagia - mechanical soft - thin liquids     Orthopedics - right knee effusion   Differential includes inflammatory process including gout or psuedogout, myopathy, or infectious process which is lowest on the differential  Weight bearing as tolerated -  Aspiration R knee - ( see labs ) - ( 80 cc of fluid removed  On 6/26 )     Medical Oncology - plan biopsy - GI eval for dysphagia  Of solids

## 2018-06-27 NOTE — ASSESSMENT & PLAN NOTE
· Starting approximately February of 2017 after a flu-like illness  Reported myalgia, weakness more in the proximal than the distal muscles with atrophy that is getting worse over the past few months, difficulty swallowing  He states that he took azithromycin and had an allergic reaction, consisting of a rash, and has had progressive proximal muscle weakness since then  After his evaluation he was noted to have lymphadenopathy and was referred to Dr China Perez  · He was evaluated at [facility] in [place] by Rheumatology and Neurology, workup was negative for serum protein electrophoresis, immunofixation, rheumatoid factor however CRP was elevated, CPK was low, vitamin B12, TSH, T4, REBECCA, HIV, CMV, DNA antibodies, aldolase, Sjogren antibodies, Boland antibodies, ACR H receptor with negative  sed rate was elevated at 31, Abby-Barr virus IgG positive however the IgM was negative  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 [facility]  · He had EMG x 2 showed 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, 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   · MRI of the cervical spine showed multilevel degenerative changes no evidence of spinal canal stenosis or cord impingement  · CT scan of the chest abdomen and pelvis on June 2018 showed left paratracheal lymph nodes measuring 1 3 x 0 8 cm, left internal mammary lymph nodes measuring 1 3 x 0 9 cm, AP window lymph node measuring 1 4 x 1 2 cm, no evidence of pericardial effusion, no evidence of masses in the liver or the spleen or the pancreas   Borderline enlarged lymph nodes at the portal region  · Has received corticosteroids with improvement and Plaquenil without improvement  · muscle biopsy inconclusive   · Follows with [facility] currently no diagnosis has been established   · He reports that his next evaluation will be a lumbar puncture with CSF studies with neurologist  Raoul Killian this Friday  Consider inpatient if not discharged before then  (however he states she wants him to get all dx studies at [address])  · Previously evaluated by Rheumatology Dr Whit Miller?  6-8 mo ago who recommend neuro eval, patient and family requesting rheum eval

## 2018-06-27 NOTE — CASE MANAGEMENT
Notification of Inpatient Admission/Inpatient Authorization Request  This is a Notification of Inpatient Admission/Request for Inpatient Authorization to our facility [facility]  Please be advised that this patient is currently in our facility under Inpatient Status  Below you will find the Attending Physician and Facilitys information including NPI# and contact information for the Utilization  assigned to the [facility] where the patient is receiving services  Please feel free to contact the Utilization Review Department with any questions  Patient Information:  PATIENT NAME: Alistair Carrizales  MRN: [id number]  YOB: 1960    PRESENTATION DATE: 6/26/2018 11:01 AM  IP ADMISSION DATE: 6/26/18 1233  DISCHARGE DATE: No discharge date for patient encounter  DISPOSITION: Home/Self Care    Attending Physician:  SUMMER Ghosh  [facility] Practitioner ID- [id number]  [address] [place] [address]  Phone 1: [phone]  Fax: [phone]  Facility:  [address] [place] [address] [phone]  NPI: [id number]  TAX ID# [id number]  MEDICARE ID: [id number]    [facility] in the [facility] by Donal Vega for 2017  Network Utilization Review Department  Phone: [phone]; Fax [phone]  ATTENTION: The Network Utilization Review Department is now centralized for our 7 Facilities  Make a note that we have a new phone and fax numbers for our Department  Please call with any questions or concerns to [phone] and carefully follow the prompts so that you are directed to the right person  All voicemails are confidential  Fax any determinations, approvals, denials, and requests for initial or continue stay review clinical to [phone]   Due to HIGH CALL volume, it would be easier if you could please send faxed requests to expedite your requests and in part, help us provide discharge notifications faster

## 2018-06-27 NOTE — ASSESSMENT & PLAN NOTE
· Esophageal dysphagia x 3 months of intermittent sx with solids and pills, no regurgitation, no issue with liquids   · Difficulty swallowing solids and has to drink liquids assist with swallowing     · Likely secondary to his unspecified myopathy versus Candida esophagitis  · Cleared by speech for reg diet, no oropharyngeal dysfunction, recommend reg diet with thins  · Continue nystatin swish and swallow    · Plan for EGD to r/o obstruction lesion, ring, EOE for tomorrow   · NPO at midnight

## 2018-06-27 NOTE — CONSULTS
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  He has had night sweats in the past 2 months  He has had difficulty with swallowing hard foods such as meets and pills, however, he is able to swallow softer foods and liquids without difficulty  Workup done at the [facility] included CT scan the of the chest, abdomen and pelvis the in June 2018 revealing left paratracheal lymph nodes measuring 1 3 x 0 8 cm, left internal mammary lymph nodes measuring 1 3 x 0 9 cm, AP window lymph node measuring 1 4 x 1 2 cm, no evidence of pericardial effusion, no evidence of masses in the liver or the spleen or the pancreas, and borderline enlarged lymph nodes at the portal region    Right knee arthrocentesis was done by Dr Joe Byers on June 26, 2018  Consults    ROS:   General:  See HPI  Head and Neck: No nosebleeds, no oral cavity or throat soreness  Cardiovascular: No chest pain, no lower extremity edema  Respriatory: No cough or dyspnea  GI: Appetite is fairly good, no abdominal pain, bowel habits formed and regular  : No urinary frequency  Musculoskeletal:  He has arthritic discomfort of the knees and to lesser extent the ankles, posterior neck and lower back    Skin: No skin rash  Neurological: No headache, no numbness, no focal weakness  Hematologic: No easy bruising  Psychiatric: No emotional problems    Historical Information   Past Medical History:   Diagnosis Date    Leg pain     bilateral leg pain (chronic)    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    Neoplasm of skin     lasr assessed 5/14/13, 5/25/15     Past Surgical History:   Procedure Laterality Date    ROTATOR CUFF REPAIR      ROTATOR CUFF REPAIR Left 3/1/2018    Procedure: SHOULDER ARTHROSCOPY WITH ROTATOR CUFF REPAIR, SUBACROMIAL DECOMPRESSION, LIMITED SYNOVECTOMY;  Surgeon: Kemal Hogan MD;  Location: [address] [place] [address];  Service: Orthopedics    SHOULDER ARTHROSCOPY      2015    TONSILLECTOMY       Social History   History   Alcohol Use    0 6 oz/week    1 Standard drinks or equivalent per week     Comment: social vodka     History   Drug Use No     History   Smoking Status    Former Smoker    Packs/day: 1 00    Years: 15 00    Types: Cigarettes    Quit date: 2/26/1986   Smokeless Tobacco    Never Used     He works servicing the and repairing elevators  Previously he worked as a   He drinks about 1 shot of vodka per day  Family History:   Family History   Problem Relation Age of Onset    Hypertension Mother     Diabetes Father     Arthritis Father     Diabetes Other      His mother age 80 was in good health  His father age 80 has dementia  He has 8 siblings, a sister age 61 has ovarian cancer in remission, a sister age 61 has breast cancer in remission and a sister and her late 45s has breast cancer in remission  He has 1 other sister and 3 brothers who are in good health  He has 2 sons and a daughter, all 3 of his children are in good health      Meds/Allergies   current meds:   Current Facility-Administered Medications   Medication Dose Route Frequency    acetaminophen (TYLENOL) tablet 650 mg  650 mg Oral Q6H PRN    lidocaine (PF) (XYLOCAINE-MPF) 1 % injection 10 mL  10 mL Infiltration Once    magnesium hydroxide (MILK OF MAGNESIA) 400 mg/5 mL oral suspension 30 mL  30 mL Oral Daily PRN    nystatin (MYCOSTATIN) oral suspension 500,000 Units  500,000 Units Swish & Swallow 4x Daily    ondansetron (ZOFRAN) injection 4 mg  4 mg Intravenous Q6H PRN    and PTA meds: Prescriptions Prior to Admission   Medication    Acetaminophen (TYLENOL) 325 MG CAPS    b complex vitamins capsule    cholecalciferol (VITAMIN D3) 1,000 units tablet    Coenzyme Q10 60 MG CAPS    MAGNESIUM PO    Multiple Vitamin (MULTIVITAMIN) tablet    PROBIOTIC PRODUCT PO    saccharomyces boulardii (FLORASTOR) 250 mg capsule     Allergies   Allergen Reactions    Azithromycin Rash and Hives       Objective   Vitals: Blood pressure 117/80, pulse 82, temperature 98 7 °F (37 1 °C), temperature source Oral, resp  rate 17, height 5' 7" (1 702 m), weight 66 2 kg (146 lb), SpO2 97 %  Intake/Output Summary (Last 24 hours) at 06/27/18 0733  Last data filed at 06/27/18 0300   Gross per 24 hour   Intake             1370 ml   Output                0 ml   Net             1370 ml     Invasive Devices     Peripheral Intravenous Line            Peripheral IV 06/26/18 Left Antecubital less than 1 day          Epidural Line            Nerve Block Catheter 03/01/18 117 days                Physical Exam: General appearance: Appears well  Head: Normocephalic  Eyes: Extraocular movements intact  Ears: No gross hearing deficit  Oropharynx: Clear  Neck: Supple, No lymphadenopathy  Chest: No axillary adenopathy  Lungs: Clear to auscultation bilaterally  Heart: Regular rate and rhythm  Abdomen: No tenderness, no hepatic or splenic enlargement; No inguinal  lymphadenopathy  Extremities: No lower extremity edema bilaterally  The right knee is wrapped in an Ace bandage    Skin: No rashes or ecchymoses  Neurologic: Grossly intact, no focal neurological deficit  Psychiatric: Oriented to person, place and time, normal mood and affect    ECOG 1    Lab Results:     From June 26, 2018:  Creatinine 0 68, AST 21, ALT 30, alk-phos 113, bili 0 41, CK 35, WBC 11 03, hemoglobin 13 0, platelets 730, on WBC differential neutrophils 67%, lymphs 18%, monos 12%, eos 1%, basos 1% ESR 83, synovial fluid from the right knee with WBC 10,842, RBC 84, on white cell differential neutrophils 76%, lymphs 19%, monos 5%, no crystal seen, on Gram smear there is 3+ polys but no bacteria seen    Assessment/Plan     Assessment:    1  Failure to thrive  There has been unintentional weight loss of 30 lb in the past year and generalized weakness  Possibly there is underlying neoplastic process  2   Arthritic discomfort of the knees and to lesser extent the ankles, posterior neck and lower back  Right arthrocentesis done on June 26, 2018 is consistent with underlying inflammation, culture is yet pending    3  Dysphagia for solids  There has been no regurgitation  4   Borderline lymphadenopathy of left paratracheal, left internal mammary, AP window, and portal region on CT scan done at the [address]:    Recent CT scan of chest abdomen pelvis done at the [facility] is to be reviewed with [facility] and then decision as to biopsy, possibly endoscopic ultrasound guided bronchoscopic biopsy of a mediastinal node  GI evaluation is planned in regards to symptoms of dysphagia for solids  The patient is to follow up with his hematologist Monico Walker upon hospital discharge  Counseling / Coordination of Care  Total floor / unit time spent today 45 minutes  Greater than 50% of total time was spent with the patient and / or family counseling and / or coordination of care  A description of the counseling / coordination of care:  Diagnostic tests, impressions and recommendations were reviewed with the patient

## 2018-06-27 NOTE — OCCUPATIONAL THERAPY NOTE
[address] [facility]     Patient Name: Liban Bray  Today's Date: 6/27/2018  Problem List  Patient Active Problem List   Diagnosis    Abnormal bleeding time    Actinic keratosis    Left rotator cuff tear    Muscle atrophy    Myalgia    Transient disorder of initiating or maintaining sleep    Weakness of left arm    Encounter for monitoring of hydroxychloroquine therapy    Pre-op examination    Preoperative clearance    Dysphagia    Lymphadenopathy    Knee effusion, right    Oral candidiasis     Past Medical History  Past Medical History:   Diagnosis Date    Leg pain     bilateral leg pain (chronic)    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    Neoplasm of skin     lasr assessed 5/14/13, 5/25/15     Past Surgical History  Past Surgical History:   Procedure Laterality Date    ROTATOR CUFF REPAIR      ROTATOR CUFF REPAIR Left 3/1/2018    Procedure: SHOULDER ARTHROSCOPY WITH ROTATOR CUFF REPAIR, SUBACROMIAL DECOMPRESSION, LIMITED SYNOVECTOMY;  Surgeon: Huseyin iSerra MD;  Location: [address] [place] [address];  Service: Orthopedics    SHOULDER ARTHROSCOPY      2015    TONSILLECTOMY        06/27/18 1057   Note Type   Note type Eval/Treat   Restrictions/Precautions   Weight Bearing Precautions Per Order No   Other Precautions Pain   Pain Assessment   Pain Score 4   Pain Location Knee   Pain Orientation Right   Pain Descriptors Sore   Pain Frequency Intermittent   Hospital Pain Intervention(s) Medication (See MAR); Repositioned; Ambulation/increased activity; Distraction; Emotional support   Home Living   Type of [address] [place] [address] Two level; Able to live on main level with bedroom/bathroom;Stairs to enter without rails  (3 [address], Bilevel with 8 steps to main fl)   Bathroom Shower/Tub Walk-in shower   Bathroom Toilet Standard   Bathroom Equipment (none)   [address] (none)   Prior Function   Level of [place] Independent with ADLs and functional mobility   Lives With Spouse   Receives Help From Family   ADL Assistance Independent   IADLs Independent   Falls in the last 6 months 0   Vocational Full time employment   Comments recently changed work duties to accomodate for declining stamina (elevator repair)   Lifestyle   Autonomy I ADL, IADL, drives, active, employed  No AD or equip use  Psychosocial   Psychosocial (WDL) WDL   Subjective   Subjective " I was in OP PT  for general stiffness and weakness and has to stop as it exacerbated my symptoms"     ADL   Where Assessed Edge of bed   Eating Assistance 7  Independent   Grooming Assistance 7  Independent   UB Bathing Assistance 7  Independent   LB Bathing Assistance 5  Supervision/Setup   UB Dressing Assistance 7  Independent   LB Dressing Assistance 5  Supervision/Setup   Toileting Assistance  7  Independent   Additional Comments general stiffness limits ROM LB > UB   Bed Mobility   Additional Comments NT, reports I    Transfers   Sit to Stand 7  Independent   Stand to Sit 7  Independent   Stand pivot 7  Independent   Toilet transfer 7  Independent   Additional items Standard toilet  (has adjacent sink for support at home prn)   Additional Comments R knee ace wrapped, tolerates WB, declines AD   Functional Mobility   Functional Mobility 7  Independent   Additional items (none)   Balance   Static Sitting Normal   Dynamic Sitting Good   Static Standing Fair +   Dynamic Standing Fair +   Activity Tolerance   Activity Tolerance Patient limited by pain   Nurse Made Aware yes   RUE Assessment   RUE Assessment WFL  (4/5)   LUE Assessment   LUE Assessment WFL  (4/5, recent DC OP therapy for L RTC tear with good results )   Hand Function   Gross Motor Coordination Functional   Fine Motor Coordination Functional   Vision-Basic Assessment   Current Vision Wears glasses all the time   Cognition   Overall Cognitive Status [facility]   Arousal/Participation Alert; Cooperative   Attention Within functional limits   Orientation Level Oriented X4 Memory Within functional limits   Following Commands Follows all commands and directions without difficulty   Assessment   Assessment Pt is a 61 y o  male seen for OT evaluation s/p admit to [facility] on 6/26/2018 w/ Dysphagia  He presents with a 30 lb weight loss over the past year  He feels generally weak  He has gotten so weak that it is hard for 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  He has had night sweats in the past 2 months  He has had difficulty with swallowing hard foods such as meets and pills, however, he is able to swallow softer foods and liquids without difficulty  Workup done at the [facility] included CT scan the of the chest, abdomen and pelvis the in June 2018 revealing left paratracheal lymph nodes measuring 1 3 x 0 8 cm, left internal mammary lymph nodes measuring 1 3 x 0 9 cm, AP window lymph node measuring 1 4 x 1 2 cm  Also  With R knee effusion s/p arthrocentesis 6/26  Comorbidities affecting pt's functional performance at time of assessment include: cancer history as above, myalgia , L RTC tear with recovery   Personal factors affecting pt at time of IE include:steps to enter environment  Prior to admission, pt was I ADL, IADL, active , employed  Recently changed work duties to accomodate for declining stamina (elevator repair)  Upon evaluation: Pt demonstrates I to S ADL, functional transfers, mobility with good toleraance, safety  Continues with general stiffness, weakness, decreased endurance as previous  Pt not in need of continued skilled OT tx while in the hospital   Defer strengthening needs to PT as indicated  From OT standpoint, recommendation at time of d/c would be Home Independent  DC OT      Goals   Patient Goals home, continue employment, general strengthening/conditioning   Plan   OT Frequency Eval only   Additional Treatment Session   Start Time 1110   End Time 1121   Treatment Assessment Pt seen for OT treatment alongside evaluation  Trained in [place] [address] [place]  Educated in shower chair as needed (narrow based acceptable due to small shower area) Trained modified tech for LB reach for cares with R knee pain, general stiffness and decreased ROM  Recommended LBS, reacher use item retrieval from standing      Recommendation   Recommendation PT consult   OT Discharge Recommendation Home independent   OT - OK to Discharge Yes   Barthel Index   Feeding 10   Bathing 5   Grooming Score 5   Dressing Score 10   Bladder Score 10   Bowels Score 10   Toilet Use Score 10   Transfers (Bed/Chair) Score 15   Mobility (Level Surface) Score 15   Stairs Score 0  (NT)   Barthel Index Score 90   Modified [place] Scale   Modified [place]io Scale 2      Nina Alanis, MOTR/L

## 2018-06-28 ENCOUNTER — ANESTHESIA EVENT (INPATIENT)
Dept: GASTROENTEROLOGY | Facility: HOSPITAL | Age: 58
DRG: 092 | End: 2018-06-28
Payer: COMMERCIAL

## 2018-06-28 ENCOUNTER — ANESTHESIA (INPATIENT)
Dept: GASTROENTEROLOGY | Facility: HOSPITAL | Age: 58
DRG: 092 | End: 2018-06-28
Payer: COMMERCIAL

## 2018-06-28 PROBLEM — G72.9 MYOPATHY: Status: ACTIVE | Noted: 2017-11-13

## 2018-06-28 PROCEDURE — 99233 SBSQ HOSP IP/OBS HIGH 50: CPT | Performed by: NURSE PRACTITIONER

## 2018-06-28 PROCEDURE — 88305 TISSUE EXAM BY PATHOLOGIST: CPT | Performed by: PATHOLOGY

## 2018-06-28 PROCEDURE — 43239 EGD BIOPSY SINGLE/MULTIPLE: CPT | Performed by: INTERNAL MEDICINE

## 2018-06-28 PROCEDURE — 0DB18ZX EXCISION OF UPPER ESOPHAGUS, VIA NATURAL OR ARTIFICIAL OPENING ENDOSCOPIC, DIAGNOSTIC: ICD-10-PCS | Performed by: INTERNAL MEDICINE

## 2018-06-28 PROCEDURE — 99255 IP/OBS CONSLTJ NEW/EST HI 80: CPT | Performed by: PSYCHIATRY & NEUROLOGY

## 2018-06-28 RX ORDER — SODIUM CHLORIDE 9 MG/ML
INJECTION, SOLUTION INTRAVENOUS CONTINUOUS PRN
Status: DISCONTINUED | OUTPATIENT
Start: 2018-06-28 | End: 2018-06-28 | Stop reason: SURG

## 2018-06-28 RX ORDER — LIDOCAINE HYDROCHLORIDE 10 MG/ML
INJECTION, SOLUTION INFILTRATION; PERINEURAL AS NEEDED
Status: DISCONTINUED | OUTPATIENT
Start: 2018-06-28 | End: 2018-06-28 | Stop reason: SURG

## 2018-06-28 RX ORDER — PANTOPRAZOLE SODIUM 40 MG/1
40 TABLET, DELAYED RELEASE ORAL
Status: DISCONTINUED | OUTPATIENT
Start: 2018-06-28 | End: 2018-06-29 | Stop reason: HOSPADM

## 2018-06-28 RX ORDER — PROPOFOL 10 MG/ML
INJECTION, EMULSION INTRAVENOUS AS NEEDED
Status: DISCONTINUED | OUTPATIENT
Start: 2018-06-28 | End: 2018-06-28 | Stop reason: SURG

## 2018-06-28 RX ORDER — PROPOFOL 10 MG/ML
INJECTION, EMULSION INTRAVENOUS CONTINUOUS PRN
Status: DISCONTINUED | OUTPATIENT
Start: 2018-06-28 | End: 2018-06-28 | Stop reason: SURG

## 2018-06-28 RX ADMIN — NYSTATIN 500000 UNITS: 100000 SUSPENSION ORAL at 13:57

## 2018-06-28 RX ADMIN — NYSTATIN 500000 UNITS: 100000 SUSPENSION ORAL at 08:31

## 2018-06-28 RX ADMIN — PANTOPRAZOLE SODIUM 40 MG: 40 TABLET, DELAYED RELEASE ORAL at 18:09

## 2018-06-28 RX ADMIN — PROPOFOL 140 MCG/KG/MIN: 10 INJECTION, EMULSION INTRAVENOUS at 12:20

## 2018-06-28 RX ADMIN — PROPOFOL 100 MG: 10 INJECTION, EMULSION INTRAVENOUS at 12:20

## 2018-06-28 RX ADMIN — NYSTATIN 500000 UNITS: 100000 SUSPENSION ORAL at 18:10

## 2018-06-28 RX ADMIN — NYSTATIN 500000 UNITS: 100000 SUSPENSION ORAL at 21:15

## 2018-06-28 RX ADMIN — SODIUM CHLORIDE: 0.9 INJECTION, SOLUTION INTRAVENOUS at 11:13

## 2018-06-28 RX ADMIN — LIDOCAINE HYDROCHLORIDE 100 MG: 10 INJECTION, SOLUTION INFILTRATION; PERINEURAL at 12:20

## 2018-06-28 NOTE — PROGRESS NOTES
06/28/18 [facility]   Spiritual Beliefs/Perceptions   Concept of God Accepting   Relationship with God Close   Spiritual Strengths Lola; Prayer   Support Systems Spouse/significant other;Children;Family members   Plan of Care   Comments Introduces self and begins to establish a caring relationship with pt and family  Provided a safe space for pt and his family to express their thoughts and emotions       Assessment Completed by: Unit visit

## 2018-06-28 NOTE — ANESTHESIA POSTPROCEDURE EVALUATION
Post-Op Assessment Note      CV Status:  Stable    Mental Status:  Alert and awake    Hydration Status:  Euvolemic    PONV Controlled:  Controlled    Airway Patency:  Patent    Post Op Vitals Reviewed: Yes          Staff: CRNA           /76 (06/28/18 1232)    Temp      Pulse 90 (06/28/18 1232)   Resp 16 (06/28/18 1232)    SpO2 96 % (06/28/18 1232)

## 2018-06-28 NOTE — DISCHARGE INSTRUCTIONS
IMPRESSIONS: Mild esophagitis seen  Normal stomach  Normal, symmetrical,   and patent pylorus  Normal duodenum  RECOMMENDATIONS: Anti-reflux measures: Raise the head of the bed 4 to 6   inches  Avoid smoking  Avoid excess coffee, tea or other caffeinated   beverages  Avoid garments that fit tightly through the abdomen  Avoid   eating before bed  Resume regular diet as tolerated  Begin medication as   prescribed  Begin taking omeprazole (Prilosec) 20 mg PO twice a day

## 2018-06-28 NOTE — CONSULTS
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  Exam remains relatively unchanged from previously documented exams   -Plan of care discussed with patient/family  They are agreeable to obtaining LP while admitted  We also discussed the possibility of administering a 5 day course of IVIG, patient/family currently discussing  -LP ordered, CSF/serum studies placed  -Continue to monitor and notify with changes    2  Dysphagia   -Progressive over past 3 months  Intermittent with symptoms occurring with solids and pills, no regurgitation, no issues with liquids  -EGD performed earlier today  Mild candida esophagitis   -Has been cleared by speech for regular diet  -Antifungals per primary team    3  Lymphadenopathy  -CT CAP in 6/2018 revleaed borderline lymphadenopathy of left paratracheal, left internal mammary, AP windo, and portal region on CT scan performed at [address] going to review with Radiology to decide on possibly biopsy  -Paraneoplastic CSF/serum studies ordered as above    History of Present Illness     Reason for Consult / Principal Problem: Dysphagia    HPI: Alistair Carrizales is a 61 y o  male who presented from rheumatologist office on 06/26 with concerns of worsening dysphasia  Patient also has history of muscle weakness/atrophy since February 2017, which reportedly started after viral upper respiratory illness associated with fevers and myalgias    Patient states upper respiratory illness lasted approximately 1 week and when it did not resolve, he was treated with Z-Imtiaz, which caused an allergic reaction  He has had weakness/atrophy and mild this since that time  He states symptoms have not necessarily worsen since then, but have fluctuated slightly with some periods better than others  After the Z-Imtiaz, he was treated with a 10 day course of steroids, which seemed to improve his symptoms slightly  He was seen by rheumatologist at this time, we decided from a low-dose steroids  This again seemed to improve his symptoms, the patient did not want to be on steroids long-term, so he weaned himself off  Patient then started seeing a neurologist for evaluation of proximal weakness and atrophy  He was last seen by Dr Humberto aCnchola as stated below, and history further summarized there  Patient recently seen by Dr Humberto Canchola of [facility] on 04/04/2018, previously seen by Dr Yang Florence of [facility]  History paraphrased from Dr Rina Spencer note as follows:    Patient presented for evaluation of proximal limb weakness following a flu like viral illness in February 2017  Patient states that he has become slightly weaker in recent months, clearly progressing beyond post viral period  Patient reporting needing to use his arms to lift himself out of a low chair and generally not tolerating exercise well  Also complaining of very mild dysphagia described as intermittent  Also reported occasional fluctuating myalgias  Patient's most recent CK was normal at 95 in June 2017, but he was on corticosteroids at this time  Patient did have 2 EMG studies at [facility]  The 1st was on 11/21/2017, more recent was on 03/20 3/18  Both studies she indicated myelopathic process  Importantly, no abnormal spontaneous activity (see no fibrillation potentials) to suggest there is active muscle fiber necrosis  Dr Rina Spencer impression indicated that history seemed consistent with mild myopathic process  Possibly post viral myositis with symptoms worsening after viral infection    Also possibly mild progressive myopathy  Doubt inclusion body myopathy  Since that time, patient has had a muscle biopsy, which revealed type 2 mild fiber atrophy of moderately severe degree  There was no active myonecrosis, regeneration, sizable inflammation  He was seen recently by a rheumatologist as above and was instructed to come to the ED for further evaluation/workup  Inpatient consult to Neurology  Consult performed by: Hernan Sanchez  Consult ordered by: Jyoti Arias          Review of Systems  12 point ROS performed, as stated above, all others negative  Historical Information   Past Medical History:   Diagnosis Date    Leg pain     bilateral leg pain (chronic)    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    Neoplasm of skin     lasr assessed 5/14/13, 5/25/15     Past Surgical History:   Procedure Laterality Date    ROTATOR CUFF REPAIR      ROTATOR CUFF REPAIR Left 3/1/2018    Procedure: SHOULDER ARTHROSCOPY WITH ROTATOR CUFF REPAIR, SUBACROMIAL DECOMPRESSION, LIMITED SYNOVECTOMY;  Surgeon: Huseyin Sierra MD;  Location: [address] [place] [address];  Service: Orthopedics    SHOULDER ARTHROSCOPY      2015    TONSILLECTOMY       Social History   History   Alcohol Use    0 6 oz/week    1 Standard drinks or equivalent per week     Comment: social vodka     History   Drug Use No     History   Smoking Status    Former Smoker    Packs/day: 1 00    Years: 15 00    Types: Cigarettes    Quit date: 2/26/1986   Smokeless Tobacco    Never Used     Family History:   Family History   Problem Relation Age of Onset    Hypertension Mother     Diabetes Father     Arthritis Father     Diabetes Other        Review of previous medical records was completed      Meds/Allergies   all current active meds have been reviewed and current meds:   Current Facility-Administered Medications   Medication Dose Route Frequency    acetaminophen (TYLENOL) tablet 650 mg  650 mg Oral Q6H PRN    enoxaparin (LOVENOX) subcutaneous injection 40 mg  40 mg Subcutaneous Q24H [address]    lidocaine (PF) (XYLOCAINE-MPF) 1 % injection 10 mL  10 mL Infiltration Once    magnesium hydroxide (MILK OF MAGNESIA) 400 mg/5 mL oral suspension 30 mL  30 mL Oral Daily PRN    nystatin (MYCOSTATIN) oral suspension 500,000 Units  500,000 Units Swish & Swallow 4x Daily    ondansetron (ZOFRAN) injection 4 mg  4 mg Intravenous Q6H PRN    pantoprazole (PROTONIX) EC tablet 40 mg  40 mg Oral BID AC       Allergies   Allergen Reactions    Azithromycin Rash and Hives       Objective   Vitals:Blood pressure 111/83, pulse 79, temperature 98 4 °F (36 9 °C), temperature source Oral, resp  rate 18, height 5' 7" (1 702 m), weight 66 2 kg (146 lb), SpO2 96 %  ,Body mass index is 22 87 kg/m²  Intake/Output Summary (Last 24 hours) at 06/28/18 1517  Last data filed at 06/28/18 1445   Gross per 24 hour   Intake             1310 ml   Output              725 ml   Net              585 ml       Invasive Devices: Invasive Devices     Peripheral Intravenous Line            Peripheral IV 06/26/18 Left Antecubital 2 days          Epidural Line            Nerve Block Catheter 03/01/18 119 days                Physical Exam   Constitutional: He is oriented to person, place, and time  No distress  HENT:   Head: Normocephalic and atraumatic  Eyes: EOM are normal  Pupils are equal, round, and reactive to light  Neck: Normal range of motion  Neck supple  Cardiovascular: Normal rate and regular rhythm  Pulmonary/Chest: Effort normal and breath sounds normal    Abdominal: Soft  Neurological: He is oriented to person, place, and time  He has a normal Finger-Nose-Finger Test    Reflex Scores:       Tricep reflexes are 2+ on the right side and 2+ on the left side  Bicep reflexes are 3+ on the right side and 3+ on the left side  Brachioradialis reflexes are 3+ on the right side and 3+ on the left side  Patellar reflexes are 3+ on the right side and 3+ on the left side         Achilles reflexes are 2+ on the right side and 2+ on the left side  Skin: Skin is warm and dry  He is not diaphoretic  Psychiatric: He has a normal mood and affect  His speech is normal and behavior is normal  Judgment and thought content normal    Flat affect     Neurologic Exam     Mental Status   Oriented to person, place, and time  Attention: normal  Concentration: normal    Speech: speech is normal   Level of consciousness: alert    Cranial Nerves     CN II   Visual fields full to confrontation  CN III, IV, VI   Pupils are equal, round, and reactive to light  Extraocular motions are normal    Upgaze: normal  Downgaze: normal  Conjugate gaze: present    CN V   Right facial sensation deficit: none    CN VII   Right facial weakness: none    CN VIII   CN VIII normal      CN XI   CN XI normal      CN XII   CN XII normal    Decreased palate elevation, but symmetric     Motor Exam     Strength   Strength 5/5 except as noted  Atrophy noted throughout, proximal greater than distal    Right deltoid 4+/5, left deltoid 5-/5  Triceps 5-/5 bilaterally  Right hip 4-/5, left hip 4+/5  Right knee extension 5-/5    Patient also able to the end stand from sitting position 20 times in a row, without use of arms       Sensory Exam   Sensation intact to light touch, pinprick, vibration throughout     Gait, Coordination, and Reflexes     Coordination   Finger to nose coordination: normal    Tremor   Resting tremor: absent  Intention tremor: absent    Reflexes   Right brachioradialis: 3+  Left brachioradialis: 3+  Right biceps: 3+  Left biceps: 3+  Right triceps: 2+  Left triceps: 2+  Right patellar: 3+  Left patellar: 3+  Right achilles: 2+  Left achilles: 2+  Right plantar: normal  Left plantar: normal  Right Martins: absent  Left Martins: absent  Right ankle clonus: absent  Left crossed adductor  Left ankle clonus, few beats       Lab Results: I have personally reviewed pertinent reports       No results found for this or any previous visit (from the past 24 hour(s))  Imaging Studies: I have personally reviewed pertinent reports  and I have personally reviewed pertinent films in PACS     EKG, Pathology, and Other Studies: I have personally reviewed pertinent reports         VTE Prophylaxis: Enoxaparin (Lovenox)

## 2018-06-28 NOTE — MEDICAL STUDENT
MEDICAL STUDENT  Inpatient H&P for TRAINING ONLY   Not Part of Legal Medical Record       H&P Exam - Dyan Horton 61 y o  male MRN: [id number]    Unit/Bed#: [facility] 627-01 Encounter: [id number]    Assessment/Plan:  1  Muscle Atrophy:  -started Feb 2017 after a flu-like illness  -sed rate: 83 (up from 10 on 5/1/2017)  -[facility] [place]   -muscle biopsy: which showed atrophy of Type 2 muscle fibers   -various rheumatologic and infectious disease laboratories were done (in Dr Emily Gonzalez note  from 6/26): of these CRP was elevated  -neurology consulted: plan for LP on Friday: with CSF studies including paraneoplastic panel    -d/c lovenox   2  Lymphadenopathy:  -CT chest, abd and pelvis June 2018:    -left paratracheal lymph nodes: 1 3 x  8   -left internal mammary lymph nodes: 1 3 x  9   -AP window lymph node : 1 4 x 1 2  -evaluate these with CT again to decide if bx possible: Dr Nick Jones may have disc images, if unable to access may need to repeat CT scan  3  Dysphagia:  -EGD today: mild esophagitis, normal stomach, pylorus and duodenum  -difficulty swallowing solids, can swallow liquids  -cleared by speech for regular diet: no oropharyngeal dysfunction : regular diet with thin liquids  4  Knee effusion  -arthrocentesis performed by arthro, provided increased mobility   -no bacteria, RBC 84, WBC: 10,842, no crystals   -x-ray of right knee negative   4  Oral candidiasis  -nystatin swish and swallow  -follow up pathology from esophageal bx           History of Present Illness    Dyan Horton is a 61year old male otherwise healthy who was sent to the ED by Dr Nick Jones on 6/26 for multiple symptom, including muscle weakness and weight loss  In February of 2017, he had a flu like illness which was treated with azithromycin, which he got a rash in response to  1-2 weeks after this he began having stiffness and weakness in his proximal muscles    He was prescribed a steroid taper, which provided relief  However as soon as he was done the taper, his symptoms returned  Dr Inocencia Patterson at [facility] then prescribed him with low dose steroids (5-10 mg) which he took for a few months and provided some relief so that he was able to go about his daily activities  He stopped taking the steroids in the spring of 2017 because he was concerned about side effects from chronic steroid use  He has had a 30 pound weight loss within the last year and has noticed significantly decreased bulk in his muscles  He has had a waddling and unsteady gait in this time  He has been seen at [facility] and has previously had various rheumatologic and infectious disease labs, referenced in Dr Fuller Gain note on 6/26  He has also had a muscle biopsy, which showed atrophy of type 2 muscle fibers  Over the last few months, he has begun having difficulty with swallowing solids  Food gets stuck in his throat, especially pills and he needs to take water with his food to aid in swallowing  He is able to swallow food if he cuts it into small pieces  Recently, he has been having night sweats and chills a few times per week  Within the last week he has had swelling in his ankles with pain  He also had swelling in his right knee, which orthopedic surgery evaluated and did an arthrocentesis on 6/26       ROS: General ROS: positive for  - night sweats and weight loss  Respiratory ROS: no cough, shortness of breath, or wheezing  Gastrointestinal ROS: no abdominal pain, change in bowel habits, or black or bloody stools  Musculoskeletal ROS: positive for - gait disturbance, joint pain, joint stiffness, joint swelling, muscle pain and muscular weakness      Historical Information   Past Medical History:   Diagnosis Date    Leg pain     bilateral leg pain (chronic)    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    Neoplasm of skin     lasr assessed 5/14/13, 5/25/15     Past Surgical History:   Procedure Laterality Date    ROTATOR CUFF REPAIR      ROTATOR CUFF REPAIR Left 3/1/2018    Procedure: SHOULDER ARTHROSCOPY WITH ROTATOR CUFF REPAIR, SUBACROMIAL DECOMPRESSION, LIMITED SYNOVECTOMY;  Surgeon: Sarita Sanders MD;  Location: [address] [place] [address];  Service: Orthopedics    SHOULDER ARTHROSCOPY      2015    TONSILLECTOMY       Social History   History   Alcohol Use    0 6 oz/week    1 Standard drinks or equivalent per week     Comment: social vodka     History   Drug Use No     History   Smoking Status    Former Smoker    Packs/day: 1 00    Years: 15 00    Types: Cigarettes    Quit date: 2/26/1986   Smokeless Tobacco    Never Used     Family History:   Family History   Problem Relation Age of Onset    Hypertension Mother     Diabetes Father     Arthritis Father     Diabetes Other        Meds/Allergies   all medications and allergies reviewed  Allergies   Allergen Reactions    Azithromycin Rash and Hives       Objective   First Vitals:   Blood Pressure: 132/79 (06/26/18 1059)  Pulse: 80 (06/26/18 1059)  Temperature: (!) 96 8 °F (36 °C) (06/26/18 1059)  Temp Source: Tympanic (06/26/18 1059)  Respirations: 17 (06/26/18 1059)  Height: 5' 7" (170 2 cm) (06/26/18 1542)  Weight - Scale: 66 2 kg (146 lb) (06/26/18 1542)  SpO2: 99 % (06/26/18 1059)    Current Vitals:   Blood Pressure: 111/83 (06/28/18 1500)  Pulse: 79 (06/28/18 1500)  Temperature: 98 4 °F (36 9 °C) (06/28/18 1500)  Temp Source: Oral (06/28/18 1500)  Respirations: 18 (06/28/18 1500)  Height: 5' 7" (170 2 cm) (06/26/18 1542)  Weight - Scale: 66 2 kg (146 lb) (06/26/18 1542)  SpO2: 96 % (06/28/18 1500)      Intake/Output Summary (Last 24 hours) at 06/28/18 1533  Last data filed at 06/28/18 1445   Gross per 24 hour   Intake             1310 ml   Output              725 ml   Net              585 ml       Invasive Devices     Peripheral Intravenous Line            Peripheral IV 06/26/18 Left Antecubital 2 days          Epidural Line            Nerve Block Catheter 03/01/18 119 days Physical Exam:   General: alert, awake and oriented, no acute distress  Resting comfortably in chair    Respiratory: normal air movement bilaterally, no rales or rhonchi, no wheezes  Cardiovascular: normal rate, normal S1 and S2, no murmurs  Abdominal: soft, non-tender, non-distended, bowel sounds present    Lab Results:   Lab Results   Component Value Date     06/26/2018    K 4 2 06/26/2018     06/26/2018    CO2 31 06/26/2018    ANIONGAP 5 06/26/2018    BUN 15 06/26/2018    CREATININE 0 68 06/26/2018    GLUCOSE 79 06/26/2018    CALCIUM 10 0 06/26/2018    AST 21 06/26/2018    ALT 30 06/26/2018    ALKPHOS 113 06/26/2018    PROT 8 7 (H) 06/26/2018    BILITOT 0 41 06/26/2018    EGFR 105 06/26/2018     Lab Results   Component Value Date    WBC 11 03 (H) 06/26/2018    HGB 13 0 06/26/2018    HCT 41 5 06/26/2018    MCV 93 06/26/2018     (H) 06/26/2018       Imaging: pertinent imaging reviewed

## 2018-06-28 NOTE — PROGRESS NOTES
06/28/18 1500   Clinical Encounter Type   Visited With Patient and family together   Routine Visit Introduction

## 2018-06-28 NOTE — CONSULTS
Rheumatology consultation on Mellisa Auguste:    Impression:    1  Chronic progressive proximal myopathy, noninflammatory with normal CPK and negative quad biopsy for necrosis/inflammatory cells/vasculitis, of unknown etiology  Idiopathic inflammatory myositis is excluded  Muscle biopsy from last month at [facility] showed type 2 muscle atrophy  No evidence for drug or statin use, chronic steroids although patient does like to drink vodka  Alcohol intake seem to be rather low and unlikely to be an etiologic factor  Lack of distal involvement and absence of inclusion body on muscle biopsy would be against inclusion body myopathy  Differential diagnosis this might be paraneoplastic, undiagnosed autoimmune disorder, degenerative type  myopathy, post viral disorder  2   Acute onset inflammatory arthritis right knee/ankle of 3 weeks duration with sed rate of 83  Right knee cell count was a class 2 inflammatory fluid with 10,000 white cells but no crystals  Rule out Lyme disease since the patient is from [place]  The patient continues to have generalized stiffness with some shoulder contractures but laboratory workup for connective tissue disease, SLE, RA have been unrewarding  PMR seems less likely at this time  3   Weight loss, night sweats, lymphadenopathy may point to malignancy, TB? Or part of a reactive process of a undiagnosed auto inflammatory disease  4   Dysphagia with evidence for esophagitis  Recommendation:    1  Obtain further workup including Lyme Western blot, Anca testing, C3, C4, total complement, myositis antibody panel which should include some neoplasm associated antibodies, repeat REBECCA, RF, quantitative immunoglobulins and check for IgG 4 disease  Also check QuantiFERON gold test for TB    2  Patient is due for spinal tap tomorrow      3   Await hematology opinion about value of lymph node biopsy although I wonder if it might just show at [facility] process      4   In view of his inflammatory oligo arthritis, stiff shoulders and elevated sed rate, might favor a course of steroids at 40 mg a day or higher if his Lyme test is negative  Understand that neurology may favor IVIG trial which is at been shown to help treatment resistant dermatomyositis, polymyositis as well as chronic immune demyelinating disorders etc    HPI this 58-year-old [address]  was admitted to the hospital because of progressive weight loss, night sweats, lymphadenopathy and increasing proximal weakness of his arms and legs and difficulty getting out of a chair  His symptoms began approximately 14-16 months ago when he had an episode of flu with diffuse aches and pains and fevers and could not get out of bed  He slowly recovered after 1 week  Afterwards he began to have some joint pain and generalized stiffness with weakness  He did have some respiratory problems or bronchitis and was given prednisone 60 mg a day which seem to help some  The steroids were tapered to 10 mg a day by May of 2017  He saw Dr Frankie Ocampo, Rheumatology at [facility] who found evidence for a inflammatory arthropathy and suggested prednisone for awhile and started Plaquenil 200 mg a day  He did not stay on the Plaquenil more than a month  Over the past year he has had progressive weakness and began losing weight  In January of this year he saw a neurologist at [facility] [place] [id number], who began a series of laboratory workup  CPK and aldolase tests were negative and he underwent several EMG test, confirming some type of myopathy of his arms and legs, more proximal without fibrillations  Laboratory workup was negative for myasthenia gravis and other types of neuropathies  Muscle biopsy was finally done on his right quad 1 month ago, with the report showing nonspecific changes but evidence for type 2 muscle atrophy    There was no inflammation or vasculitis or regeneration or inclusion bodies or evidence for mitochondrial disorder or other abnormalities  In the meantime the patient did see a Dr Arpan Green at [facility] neurology for 2nd opinion, who voiced that he continue workup in [facility]  Diagnosis was not clear but there was no feeling that this was a [employer]  Patient denies having fasciculations  Three weeks ago his right knee became swollen and tender he has some trouble walking and he also had swelling to his right ankle  He denied any tick bites, erythema margin jessica type bull's-eye rash  He does [place] in [place]  It should be mentioned that he had serology tests over a year ago that showed positive IgG antibodies to [facility] spotted fever and to Brandy, but the Infectious Disease consult in [place] felt this was old long-term exposure perhaps from his childhood  The patient did use to raise rabbits as a child  Other symptoms include 30 lb weight loss some night sweats stiffness all over including shoulders  No rashes of dermatomyositis, oral ulcers, sicca complaints, Raynaud's  He has had some difficulty swallowing  EGD did show esophagitis but no description for Candida  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  Family history apparently is entirely negative for any muscle disorder, muscle dystrophy, rheumatoid arthritis, SLE or autoimmune diseases in general     Past medical history as per chart  Rotator cuff repair bilaterally  Tonsillectomy  Some skin cancers  Social history positive for alcohol 1 vodka every night, stop smoking  Review of system was reviewed    Physical exam reveals a fatigued-appearing man who was examined in his chair  Vital signs stable  Skin dry without any erythematous plaques, psoriasis, or any changes of dermatomyositis  HEENT:  Pink and moist conjunctiva  EOM intact  Oral mucosa moist and free of any ulcers  No fasciculations of the tongue  Cranial nerve seem to be intact    No adenopathy of the neck  Chest clear  Cardiac regular  Abdomen nontender  He did have some mild proximal wasting of his upper deltoid proximal upper arms as well as his quads  Muscle testing revealed actually quite strong 5+ neck flexors and extensors, but 4+ proximal deltoid and about 3+ triceps  Biceps was 4 5  Distal strength of the wrist and interosseous muscles intact any had good strength  Proximal hip flexor week about 3+ as well as at doctor  3+   Dorsiflexion of the feet intact 5+ any had no foot drop  And reflexes were intact  Laboratory tests from last year revealed negative REBECCA, rheumatoid factor and CCP  He had mild elevation of sed rate  Current blood test revealed sed rate of 83, CK of 35  Synovial fluid revealed 10,800 white cells right knee with no crystals mostly polys

## 2018-06-28 NOTE — MALNUTRITION/BMI
This medical record reflects one or more clinical indicators suggestive of malnutrition  Malnutrition Findings:   Malnutrition type: Chronic illness  Degree of Malnutrition: Malnutrition of moderate degree (12 8% wt loss x5 months; mildly depleted muscle mass/ muscle atrophy; treated with liberal diet and oral nutrition supplements)  Malnutrition Characteristics: Muscle loss, Weight loss            Body mass index is 22 87 kg/m²  See Nutrition note dated 6/28/18 for additional details  Completed nutrition assessment is viewable in the nutrition documentation

## 2018-06-28 NOTE — ANESTHESIA PREPROCEDURE EVALUATION
Review of Systems/Medical History  Patient summary reviewed  Chart reviewed  No history of anesthetic complications     Cardiovascular  Negative cardio ROS    Pulmonary  Negative pulmonary ROS        GI/Hepatic  Dysphagia,          Negative  ROS        Endo/Other  Negative endo/other ROS      GYN       Hematology  Negative hematology ROS      Musculoskeletal    Arthritis     Neurology      Comment: Weakness and weight loss Psychology   Negative psychology ROS              Physical Exam    Airway    Mallampati score: I  TM Distance: >3 FB  Neck ROM: full     Dental   No notable dental hx     Cardiovascular  Comment: Negative ROS,     Pulmonary      Other Findings        Anesthesia Plan  ASA Score- 2     Anesthesia Type- IV sedation with anesthesia with ASA Monitors  Additional Monitors:   Airway Plan:         Plan Factors-  Patient did not smoke on day of surgery  Induction-     Postoperative Plan-     Informed Consent- Anesthetic plan and risks discussed with patient  I personally reviewed this patient with the CRNA  Discussed and agreed on the Anesthesia Plan with the CRNA  Jane Manrique

## 2018-06-28 NOTE — OP NOTE
**** GI/ENDOSCOPY REPORT ****     PATIENT NAME: Blayne Mcgee - VISIT ID:  Patient ID: [id number]   YOB: 1960     INTRODUCTION: EGD with Dilatation - A 61 male patient presents for an   inpatient EGD with Dilatation at [address] [place] [address]  INDICATIONS: Dysphagia  CONSENT: The benefits, risks, and alternatives to the procedure were   discussed and informed consent was obtained from the patient  PREPARATION:  EKG, pulse, pulse oximetry and blood pressure were monitored   throughout the procedure  MEDICATIONS: Anesthesia-check records     PROCEDURE:  The endoscope was passed with ease through the mouth under   direct visualization and advanced to the 2nd portion of the duodenum  The   scope was withdrawn and the mucosa was carefully examined  FINDINGS:  Mild LA Class B esophagitis was found in the esophagus  Multiple random biopsies was taken from the proximal third of the   esophagus  The stomach appeared to be normal  The pylorus appeared to be   normal, symmetrical, and patent  The duodenum appeared to be normal      COMPLICATIONS: There were no complications  IMPRESSIONS: Mild esophagitis seen  Normal stomach  Normal, symmetrical,   and patent pylorus  Normal duodenum  RECOMMENDATIONS: Anti-reflux measures: Raise the head of the bed 4 to 6   inches  Avoid smoking  Avoid excess coffee, tea or other caffeinated   beverages  Avoid garments that fit tightly through the abdomen  Avoid   eating before bed  Resume regular diet as tolerated  Begin medication as   prescribed  Begin taking omeprazole (Prilosec) 20 mg PO twice a day  ESTIMATED BLOOD LOSS:     PROCEDURE CODES:     ICD-9 Codes: 787 2 DYSPHAGIA 530 10 Esophagitis, unspecified     ICD-10 Codes: R13 10 Dysphagia, unspecified K20 9 Esophagitis, unspecified     PERFORMED BY: SUMMER Salas  on 06/28/2018    Version 1, electronically signed by SUMMER Garcia  on 06/28/2018 at 12:35

## 2018-06-28 NOTE — OP NOTE
**** GI/ENDOSCOPY REPORT ****     PATIENT NAME: Abdelrahman Ayala - VISIT ID:  Patient ID: [id number]   YOB: 1960     INTRODUCTION: EGD with Dilatation - A 61 male patient presents for an   inpatient EGD with Dilatation at [address] [place] [address]  INDICATIONS: Dysphagia  CONSENT: The benefits, risks, and alternatives to the procedure were   discussed and informed consent was obtained from the patient  PREPARATION:  EKG, pulse, pulse oximetry and blood pressure were monitored   throughout the procedure  MEDICATIONS: Anesthesia-check records     PROCEDURE:  The endoscope was passed with ease through the mouth under   direct visualization and advanced to the 2nd portion of the duodenum  The   scope was withdrawn and the mucosa was carefully examined  FINDINGS:  Mild LA Class B esophagitis was found in the esophagus  Multiple random biopsies was taken from the proximal third of the   esophagus  The stomach appeared to be normal  The pylorus appeared to be   normal, symmetrical, and patent  The duodenum appeared to be normal      COMPLICATIONS: There were no complications  IMPRESSIONS: Mild esophagitis seen  Normal stomach  Normal, symmetrical,   and patent pylorus  Normal duodenum  RECOMMENDATIONS: Anti-reflux measures: Raise the head of the bed 4 to 6   inches  Avoid smoking  Avoid excess coffee, tea or other caffeinated   beverages  Avoid garments that fit tightly through the abdomen  Avoid   eating before bed  Resume regular diet as tolerated  Begin medication as   prescribed  Begin taking omeprazole (Prilosec) 20 mg PO twice a day  ESTIMATED BLOOD LOSS:     PROCEDURE CODES: 68322 - EGD flexible; with biopsy     ICD-9 Codes: 787 2 DYSPHAGIA 530 10 Esophagitis, unspecified     ICD-10 Codes: R13 10 Dysphagia, unspecified K20 9 Esophagitis, unspecified     PERFORMED BY: SUMMER Howe  on 06/28/2018    Version 2, modified and electronically signed by SUMMER Lau  on   06/28/2018 at 12:36, superseding version 1 signed on 06/28/2018 at 12:35

## 2018-06-29 ENCOUNTER — APPOINTMENT (INPATIENT)
Dept: RADIOLOGY | Facility: HOSPITAL | Age: 58
DRG: 092 | End: 2018-06-29
Payer: COMMERCIAL

## 2018-06-29 ENCOUNTER — TELEPHONE (OUTPATIENT)
Dept: FAMILY MEDICINE CLINIC | Facility: CLINIC | Age: 58
End: 2018-06-29

## 2018-06-29 VITALS
OXYGEN SATURATION: 97 % | HEIGHT: 67 IN | HEART RATE: 86 BPM | RESPIRATION RATE: 20 BRPM | BODY MASS INDEX: 22.91 KG/M2 | DIASTOLIC BLOOD PRESSURE: 82 MMHG | WEIGHT: 146 LBS | TEMPERATURE: 98.7 F | SYSTOLIC BLOOD PRESSURE: 111 MMHG

## 2018-06-29 LAB
APPEARANCE CSF: CLEAR
BACTERIA SPEC BFLD CULT: NO GROWTH
C GATTII+NEOFOR DNA CSF QL NAA+NON-PROBE: NOT DETECTED
C3 SERPL-MCNC: 153 MG/DL (ref 90–180)
C4 SERPL-MCNC: 29 MG/DL (ref 10–40)
CMV DNA CSF QL NAA+NON-PROBE: NOT DETECTED
E COLI K1 DNA CSF QL NAA+NON-PROBE: NOT DETECTED
EV RNA CSF QL NAA+NON-PROBE: NOT DETECTED
GLUCOSE CSF-MCNC: 57 MG/DL (ref 50–80)
GP B STREP DNA CSF QL NAA+NON-PROBE: NOT DETECTED
GRAM STN SPEC: NORMAL
HAEM INFLU DNA CSF QL NAA+NON-PROBE: NOT DETECTED
HHV6 DNA CSF QL NAA+NON-PROBE: NOT DETECTED
HSV1 DNA CSF QL NAA+NON-PROBE: NOT DETECTED
HSV2 DNA CSF QL NAA+NON-PROBE: NOT DETECTED
IGA SERPL-MCNC: 453 MG/DL (ref 70–400)
IGG SERPL-MCNC: 1370 MG/DL (ref 700–1600)
IGM SERPL-MCNC: 109 MG/DL (ref 40–230)
INR PPP: 1.13 (ref 0.86–1.17)
L MONOCYTOG DNA CSF QL NAA+NON-PROBE: NOT DETECTED
N MEN DNA CSF QL NAA+NON-PROBE: NOT DETECTED
PARECHOVIRUS A RNA CSF QL NAA+NON-PROBE: NOT DETECTED
PROT CSF-MCNC: 53 MG/DL (ref 15–45)
PROTHROMBIN TIME: 14.6 SECONDS (ref 11.8–14.2)
RBC # CSF MANUAL: 1 UL (ref 0–10)
S PNEUM DNA CSF QL NAA+NON-PROBE: NOT DETECTED
TOTAL CELLS COUNTED BLD: NO
TUBE # CSF: 4
VZV DNA CSF QL NAA+NON-PROBE: NOT DETECTED
WBC # CSF AUTO: 0 /UL (ref 0–5)

## 2018-06-29 PROCEDURE — 62270 DX LMBR SPI PNXR: CPT

## 2018-06-29 PROCEDURE — 86480 TB TEST CELL IMMUN MEASURE: CPT | Performed by: INTERNAL MEDICINE

## 2018-06-29 PROCEDURE — 87798 DETECT AGENT NOS DNA AMP: CPT | Performed by: PHYSICIAN ASSISTANT

## 2018-06-29 PROCEDURE — 009U3ZX DRAINAGE OF SPINAL CANAL, PERCUTANEOUS APPROACH, DIAGNOSTIC: ICD-10-PCS | Performed by: PSYCHIATRY & NEUROLOGY

## 2018-06-29 PROCEDURE — 99232 SBSQ HOSP IP/OBS MODERATE 35: CPT | Performed by: INTERNAL MEDICINE

## 2018-06-29 PROCEDURE — 88184 FLOWCYTOMETRY/ TC 1 MARKER: CPT | Performed by: PHYSICIAN ASSISTANT

## 2018-06-29 PROCEDURE — 99239 HOSP IP/OBS DSCHRG MGMT >30: CPT | Performed by: NURSE PRACTITIONER

## 2018-06-29 PROCEDURE — 82784 ASSAY IGA/IGD/IGG/IGM EACH: CPT | Performed by: INTERNAL MEDICINE

## 2018-06-29 PROCEDURE — 86162 COMPLEMENT TOTAL (CH50): CPT | Performed by: INTERNAL MEDICINE

## 2018-06-29 PROCEDURE — 86038 ANTINUCLEAR ANTIBODIES: CPT | Performed by: INTERNAL MEDICINE

## 2018-06-29 PROCEDURE — 88185 FLOWCYTOMETRY/TC ADD-ON: CPT

## 2018-06-29 PROCEDURE — 88108 CYTOPATH CONCENTRATE TECH: CPT | Performed by: PATHOLOGY

## 2018-06-29 PROCEDURE — 83520 IMMUNOASSAY QUANT NOS NONAB: CPT

## 2018-06-29 PROCEDURE — 83883 ASSAY NEPHELOMETRY NOT SPEC: CPT | Performed by: PHYSICIAN ASSISTANT

## 2018-06-29 PROCEDURE — 87498 ENTEROVIRUS PROBE&REVRS TRNS: CPT | Performed by: PHYSICIAN ASSISTANT

## 2018-06-29 PROCEDURE — 86255 FLUORESCENT ANTIBODY SCREEN: CPT

## 2018-06-29 PROCEDURE — 87529 HSV DNA AMP PROBE: CPT | Performed by: PHYSICIAN ASSISTANT

## 2018-06-29 PROCEDURE — 86160 COMPLEMENT ANTIGEN: CPT | Performed by: INTERNAL MEDICINE

## 2018-06-29 PROCEDURE — 85610 PROTHROMBIN TIME: CPT | Performed by: INTERNAL MEDICINE

## 2018-06-29 PROCEDURE — 86255 FLUORESCENT ANTIBODY SCREEN: CPT | Performed by: PHYSICIAN ASSISTANT

## 2018-06-29 PROCEDURE — 82945 GLUCOSE OTHER FLUID: CPT | Performed by: PHYSICIAN ASSISTANT

## 2018-06-29 PROCEDURE — 82164 ANGIOTENSIN I ENZYME TEST: CPT | Performed by: PHYSICIAN ASSISTANT

## 2018-06-29 PROCEDURE — 86255 FLUORESCENT ANTIBODY SCREEN: CPT | Performed by: INTERNAL MEDICINE

## 2018-06-29 PROCEDURE — 87070 CULTURE OTHR SPECIMN AEROBIC: CPT | Performed by: PHYSICIAN ASSISTANT

## 2018-06-29 PROCEDURE — 82784 ASSAY IGA/IGD/IGG/IGM EACH: CPT | Performed by: PHYSICIAN ASSISTANT

## 2018-06-29 PROCEDURE — 99232 SBSQ HOSP IP/OBS MODERATE 35: CPT | Performed by: PHYSICIAN ASSISTANT

## 2018-06-29 PROCEDURE — 89051 BODY FLUID CELL COUNT: CPT | Performed by: PHYSICIAN ASSISTANT

## 2018-06-29 PROCEDURE — 83519 RIA NONANTIBODY: CPT | Performed by: PHYSICIAN ASSISTANT

## 2018-06-29 PROCEDURE — 82040 ASSAY OF SERUM ALBUMIN: CPT | Performed by: PHYSICIAN ASSISTANT

## 2018-06-29 PROCEDURE — 82164 ANGIOTENSIN I ENZYME TEST: CPT | Performed by: INTERNAL MEDICINE

## 2018-06-29 PROCEDURE — 83916 OLIGOCLONAL BANDS: CPT | Performed by: PHYSICIAN ASSISTANT

## 2018-06-29 PROCEDURE — 86617 LYME DISEASE ANTIBODY: CPT | Performed by: PHYSICIAN ASSISTANT

## 2018-06-29 PROCEDURE — 86235 NUCLEAR ANTIGEN ANTIBODY: CPT | Performed by: INTERNAL MEDICINE

## 2018-06-29 PROCEDURE — 87496 CYTOMEG DNA AMP PROBE: CPT | Performed by: PHYSICIAN ASSISTANT

## 2018-06-29 PROCEDURE — 86617 LYME DISEASE ANTIBODY: CPT | Performed by: INTERNAL MEDICINE

## 2018-06-29 PROCEDURE — 87653 STREP B DNA AMP PROBE: CPT | Performed by: PHYSICIAN ASSISTANT

## 2018-06-29 PROCEDURE — 86618 LYME DISEASE ANTIBODY: CPT | Performed by: PHYSICIAN ASSISTANT

## 2018-06-29 PROCEDURE — 87532 HHV-6 DNA AMP PROBE: CPT | Performed by: PHYSICIAN ASSISTANT

## 2018-06-29 PROCEDURE — 82787 IGG 1 2 3 OR 4 EACH: CPT | Performed by: INTERNAL MEDICINE

## 2018-06-29 PROCEDURE — 89050 BODY FLUID CELL COUNT: CPT | Performed by: PHYSICIAN ASSISTANT

## 2018-06-29 PROCEDURE — 84157 ASSAY OF PROTEIN OTHER: CPT | Performed by: PHYSICIAN ASSISTANT

## 2018-06-29 PROCEDURE — 86430 RHEUMATOID FACTOR TEST QUAL: CPT | Performed by: INTERNAL MEDICINE

## 2018-06-29 RX ORDER — PANTOPRAZOLE SODIUM 40 MG/1
40 TABLET, DELAYED RELEASE ORAL
Qty: 60 TABLET | Refills: 0 | Status: SHIPPED | OUTPATIENT
Start: 2018-06-29 | End: 2018-08-28

## 2018-06-29 RX ORDER — LIDOCAINE HYDROCHLORIDE 10 MG/ML
20 INJECTION, SOLUTION INFILTRATION; PERINEURAL
Status: COMPLETED | OUTPATIENT
Start: 2018-06-29 | End: 2018-06-29

## 2018-06-29 RX ORDER — PANTOPRAZOLE SODIUM 40 MG/1
40 TABLET, DELAYED RELEASE ORAL
Qty: 60 TABLET | Refills: 0 | Status: SHIPPED | OUTPATIENT
Start: 2018-06-29 | End: 2018-06-29

## 2018-06-29 RX ADMIN — PANTOPRAZOLE SODIUM 40 MG: 40 TABLET, DELAYED RELEASE ORAL at 06:26

## 2018-06-29 RX ADMIN — NYSTATIN 500000 UNITS: 100000 SUSPENSION ORAL at 08:22

## 2018-06-29 RX ADMIN — LIDOCAINE HYDROCHLORIDE 2 ML: 10 INJECTION, SOLUTION INFILTRATION; PERINEURAL at 11:00

## 2018-06-29 NOTE — PROGRESS NOTES
CT from 6/20/18 was reviewed  The requested lymph nodes are either too small or not accessible for biopsy

## 2018-06-29 NOTE — ASSESSMENT & PLAN NOTE
· Starting approximately February of 2017 after a flu-like illness, tx with zpack with reported reaction including rash  · Reported myalgia, weakness in muscles (generalized)   with atrophy that is getting worse over the past few months, difficulty swallowing  After his evaluation he was noted to have lymphadenopathy and was referred to Dr Leonides Thakkar  · He was evaluated at [facility] [place] [facility] in [place] by Rheumatology and Neurology, workup was negative for serum protein electrophoresis, immunofixation, rheumatoid factor however CRP was elevated, CPK was low, vitamin B12, TSH, T4, REBECCA, HIV, CMV, DNA antibodies, aldolase, Sjogren antibodies, Boland antibodies, ACR H receptor with negative  sed rate was elevated at 31, Abby-Barr virus IgG positive however the IgM was negative  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 [facility]  · He had EMG x 2 showed 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, 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   · Biopsy revealed 2 myofiber atrophy of moderately severe degree  · - witnessed neuro exam with no real change from prior exam doc by neuro dr chin  · MRI of the cervical spine showed multilevel degenerative changes no evidence of spinal canal stenosis or cord impingement  · CT scan of the chest abdomen and pelvis on June 2018 showed left paratracheal lymph nodes measuring 1 3 x 0 8 cm, left internal mammary lymph nodes measuring 1 3 x 0 9 cm, AP window lymph node measuring 1 4 x 1 2 cm, no evidence of pericardial effusion, no evidence of masses in the liver or the spleen or the pancreas   Borderline enlarged lymph nodes at the portal region  · Has received corticosteroids with improvement and Plaquenil without improvement  · muscle biopsy inconclusive   · Tomorrow placed consult to ir (lovenox on hold) for LP see neuro note and Lymph node biopsy  · Follows with [facility] in [place] currently no diagnosis has been established   · He reports that his next evaluation will be a lumbar puncture with CSF studies with neurologist Dr Case Pean this Friday (which we will do tomorrow and then start IVIG tx as prev discussed with Dr Charlie Rush (neuromuscular specialist)  · Previously evaluated by Rheumatology Dr Kendy Pretty?  6-8 mo ago   · To be seen by rheum later this evening

## 2018-06-29 NOTE — PROGRESS NOTES
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  Exam remains relatively unchanged from previously documented exams   -Patient undewent LP this morning  Tolerated well/without complaints   -Serum and CSF studies pending  -Patient did not want to stay in hospital for 5 day course of IVIG, unable to go to infusion center center closed on Sundays and on a holiday, July 4th next week  Patient has follow-up with Dr Darryn Galvin of [facility] scheduled in July, will let them initiate IVIG   -No further inpatient neurologic recommendations     2  Dysphagia   -Progressive over past 3 months  Intermittent with symptoms occurring with solids and pills, no regurgitation, no issues with liquids  -EGD performed yesterday  Mild candida esophagitis   -Has been cleared by speech for regular diet  -Antifungals per primary team     3  Lymphadenopathy  -CT CAP in 6/2018 revleaed borderline lymphadenopathy of left paratracheal, left internal mammary, AP windo, and portal region on CT scan performed at [address] [place] [address] Nw to be biopsied secondary to size/location  -Paraneoplastic CSF/serum studies ordered as above    Subjective:   No acute events overnight  Patient underwent LP this morning, without complications  Patient currently without complaints  Denies CP, SOB, headache, dizziness, vision changes, N/V, abdominal pain, weakness or numbness  ROS:  12 point ROS performed, as stated above, all others negative  Medications:   All current active meds have been reviewed and current meds:  Scheduled Meds:  Current Facility-Administered Medications:  acetaminophen 650 mg Oral Q6H PRN Khadar Ardon MD   [START ON 7/2/2018] enoxaparin 40 mg Subcutaneous Q24H [address] MARK Burk   lidocaine (PF) 10 mL Infiltration Once Beauty MD Trey   magnesium hydroxide 30 mL Oral Daily PRN Khadar Ardon MD   nystatin 500,000 Units Swish & Swallow 4x Daily Khadar Ardon MD   ondansetron 4 mg Intravenous Q6H PRN Khadar Ardon MD   pantoprazole 40 mg Oral BID AC Tabatha Mack MD     Continuous Infusions:   PRN Meds:   acetaminophen    magnesium hydroxide    ondansetron     Vitals: Blood pressure 103/70, pulse 71, temperature 97 6 °F (36 4 °C), temperature source Oral, resp  rate 18, height 5' 7" (1 702 m), weight 66 2 kg (146 lb), SpO2 99 %  ,Body mass index is 22 87 kg/m²  Physical Exam:  Physical Exam   Constitutional: No distress  HENT:   Head: Normocephalic and atraumatic  Neck: Normal range of motion  Neck supple  Cardiovascular: Normal rate and regular rhythm  Pulmonary/Chest: Effort normal and breath sounds normal    Abdominal: Soft  Bowel sounds are normal    Skin: Skin is warm and dry  He is not diaphoretic  Psychiatric: He has a normal mood and affect  His behavior is normal  Judgment and thought content normal    Flattened affect     Neurologic Exam    Mental Status   Oriented to person, place, and time  Attention: normal  Concentration: normal    Speech: speech is normal   Level of consciousness: alert     Cranial Nerves      CN II   Visual fields full to confrontation       CN III, IV, VI   Pupils are equal, round, and reactive to light  Extraocular motions are normal    Upgaze: normal  Downgaze: normal  Conjugate gaze: present     CN V   Right facial sensation deficit: none     CN VII   Right facial weakness: none     CN VIII   CN VIII normal       CN XI   CN XI normal       CN XII   CN XII normal    Decreased palate elevation, but symmetric      Motor Exam      Strength   Strength 5/5 except as noted  Atrophy noted throughout, proximal greater than distal    Motor exam limited, patient laying flat secondary to recent LP  Moving extremities x4 to antigravity   strength 4+/5 currently, but patient tired/with post anesthesia effects         Sensory Exam   Sensation intact to light touch     Gait, Coordination, and Reflexes      Gait deferred secondary to recent LP         Lab Results: I have personally reviewed pertinent reports  Recent Results (from the past 24 hour(s))   IgG, IgA, IgM    Collection Time: 06/29/18  6:02 AM   Result Value Ref Range     0 (H) 70 0 - 400 0 mg/dL    IGG 1,370 0 700 0-1,600 0 mg/dL     0 40 0 - 230 0 mg/dL   C3 complement    Collection Time: 06/29/18  6:02 AM   Result Value Ref Range    C3 Complement 153 0 90 0 - 180 0 mg/dL   C4 complement    Collection Time: 06/29/18  6:02 AM   Result Value Ref Range    C4, COMPLEMENT 29 0 10 0 - 40 0 mg/dL   Protime-INR    Collection Time: 06/29/18  6:02 AM   Result Value Ref Range    Protime 14 6 (H) 11 8 - 14 2 seconds    INR 1 13 0 86 - 1 17     Imaging Studies: I have personally reviewed pertinent reports and I have personally reviewed pertinent films in PACS  EKG, Pathology, and Other Studies: I have personally reviewed pertinent reports      VTE Prophylaxis: Enoxaparin (Lovenox)

## 2018-06-29 NOTE — ASSESSMENT & PLAN NOTE
· Esophageal dysphagia x 3 months of intermittent sx with solids and pills, no regurgitation, no issue with liquids   · Difficulty swallowing solids and has to drink liquids assist with swallowing     · Likely secondary to his unspecified myopathy versus Candida esophagitis  · Cleared by speech for reg diet, no oropharyngeal dysfunction, recommend reg diet with thins  · Continue nystatin swish and swallow    ·  EGD revealed some esophagitis continue bid ppi biopsy performed   ·

## 2018-06-29 NOTE — PROGRESS NOTES
The patient was seen and examined, he still have morning stiffness, swelling of the hands  Yesterday he tolerated solid diet very well without any dysphagia or odynophagia  Denies any fever chills night sweats  We appreciate Neurology and Rheumatology input  The CT scan was reviewed by IR for possible lymph node biopsy, these are 2 small lymph nodes not accessible for biopsy  1  Patient is going for lumbar puncture today  2  I believe patient could benefit from corticosteroids along with IVIG for 5 days after consultation with Neurology  3    This could be set up as an outpatient as well however I believe given the 1st dose of IVIG/prednisone as inpatient is vital to assess response of his symptoms and depends on the results of the LP to rule out paraneoplastic syndrome or autoimmune connective tissue disease

## 2018-06-29 NOTE — PROGRESS NOTES
Patient returned from LP  Instructed to remain laying flat until about 1240  Resting comfortably with call bell in reach

## 2018-06-29 NOTE — ASSESSMENT & PLAN NOTE
· CT scan of the chest abdomen and pelvis on June 2018 showed left paratracheal lymph nodes measuring 1 3 x 0 8 cm, left internal mammary lymph nodes measuring 1 3 x 0 9 cm, AP window lymph node measuring 1 4 x 1 2 cm, no evidence of pericardial effusion, no evidence of masses in the liver or the spleen or the pancreas  Borderline enlarged lymph nodes at the portal region  · Discussed with heme/onc, consider EBUS pending results of EGD tomorrow  · Believes Dr Carol Enamorado has discs of images   If unable to access these images may need to repeat ct scan  · Appreciate oncology input

## 2018-06-29 NOTE — PROGRESS NOTES
Progress Note- Kelley Liriano 61 y o  male MRN: [id number]    Unit/Bed#: [facility] 627-01 Encounter: [id number]      Assessment and Plan:    Esophageal Dysphagia  -Cleared by speech, no oropharyngeal dysfunction  -3 months of intermittent symptoms with solids and pills, no symptoms of GERD, no issues with liquids  -EGD 6/28 with mild grade B esophagitis otherwise unremarkable, ? EOE follow-up pathology  -Continue PPI 20mg BID  -Also may have swallowing dysfunction due to underlying myopathy     Unintentional weight loss  Muscle Weakness  Lymphadenopathy  -Ongoing workup with oncology, heumatology, neurology  -For LP today  ______________________________________________________________________    Subjective:     He denies any swallowing dysfunction while eating dinner yesterday  No odynophagia   Rosalva abdominal pain     Medication Administration - last 24 hours from 06/28/2018 1020 to 06/29/2018 1020       Date/Time Order Dose Route Action Action by     06/29/2018 0822 nystatin (MYCOSTATIN) oral suspension 500,000 Units 500,000 Units Swish & Swallow Given Ashley Gould RN     06/28/2018 2115 nystatin (MYCOSTATIN) oral suspension 500,000 Units 500,000 Units Swish & Swallow Given Carlos Oglesby RN     06/28/2018 1810 nystatin (MYCOSTATIN) oral suspension 500,000 Units 500,000 Units Swish & Swallow Given Maria Antonia Parks RN     06/28/2018 1357 nystatin (MYCOSTATIN) oral suspension 500,000 Units 500,000 Units Swish & Swallow Given Maria Antonia Parks RN     06/28/2018 1249 sodium chloride 0 9 % infusion 0 mL/hr  Stopped Renay Saxena RN     06/28/2018 1225 sodium chloride 0 9 % infusion   Intravenous Anesthesia Volume Adjustment Caro Villagomez CRNA     06/28/2018 1113 sodium chloride 0 9 % infusion   Intravenous New Bag Caro Villagomez CRNA     06/29/2018 0626 pantoprazole (PROTONIX) EC tablet 40 mg 40 mg Oral Given Carlos Oglesby RN     06/28/2018 1809 pantoprazole (PROTONIX) EC tablet 40 mg 40 mg Oral Given Gabi Hedrick RN          Objective:     Vitals: Blood pressure 103/70, pulse 71, temperature 97 6 °F (36 4 °C), temperature source Oral, resp  rate 18, height 5' 7" (1 702 m), weight 66 2 kg (146 lb), SpO2 99 %  ,Body mass index is 22 87 kg/m²        Intake/Output Summary (Last 24 hours) at 06/29/18 1020  Last data filed at 06/29/18 3294   Gross per 24 hour   Intake              710 ml   Output              975 ml   Net             -265 ml       Physical Exam:   General Appearance: Awake and alert, in no acute distress  Abdomen: Soft, non-tender, non-distended; bowel sounds normal; no masses or no organomegaly    Invasive Devices     Peripheral Intravenous Line            Peripheral IV 06/26/18 Left Antecubital 2 days          Epidural Line            Nerve Block Catheter 03/01/18 120 days                Lab Results:  Admission on 06/26/2018   Component Date Value    WBC 06/26/2018 11 03*    RBC 06/26/2018 4 48     Hemoglobin 06/26/2018 13 0     Hematocrit 06/26/2018 41 5     MCV 06/26/2018 93     MCH 06/26/2018 29 0     MCHC 06/26/2018 31 3*    RDW 06/26/2018 13 2     MPV 06/26/2018 9 5     Platelets 40/82/1285 426*    nRBC 06/26/2018 0     Neutrophils Relative 06/26/2018 67     Immat GRANS % 06/26/2018 1     Lymphocytes Relative 06/26/2018 18     Monocytes Relative 06/26/2018 12     Eosinophils Relative 06/26/2018 1     Basophils Relative 06/26/2018 1     Neutrophils Absolute 06/26/2018 7 49     Immature Grans Absolute 06/26/2018 0 08     Lymphocytes Absolute 06/26/2018 1 97     Monocytes Absolute 06/26/2018 1 29*    Eosinophils Absolute 06/26/2018 0 15     Basophils Absolute 06/26/2018 0 05     Sodium 06/26/2018 137     Potassium 06/26/2018 4 2     Chloride 06/26/2018 101     CO2 06/26/2018 31     Anion Gap 06/26/2018 5     BUN 06/26/2018 15     Creatinine 06/26/2018 0 68     Glucose 06/26/2018 79     Calcium 06/26/2018 10 0     AST 06/26/2018 21     ALT 06/26/2018 30     Alkaline Phosphatase 06/26/2018 113     Total Protein 06/26/2018 8 7*    Albumin 06/26/2018 3 5     Total Bilirubin 06/26/2018 0 41     eGFR 06/26/2018 105     Total CK 06/26/2018 35*    Sed Rate 06/26/2018 83*    Body Fluid Culture, Ster* 06/26/2018 No growth     Gram Stain Result 06/26/2018 3+ Polys     Gram Stain Result 06/26/2018 No bacteria seen     RBC,SYNOVIAL 06/26/2018 84*    WBC, Fluid 06/26/2018 81642*    Crystals, Synovial Fluid 06/26/2018 No Crystals Seen     Total Counted 06/26/2018 100     Neutrophil % Synovial 06/26/2018 76     Lymph % Synovial 06/26/2018 19     Monocyte % Synovial 06/26/2018 5     IGA 06/29/2018 453 0*    IGG 06/29/2018 1370 0     IGM 06/29/2018 109 0     C3 Complement 06/29/2018 153 0     C4, COMPLEMENT 06/29/2018 29 0     Protime 06/29/2018 14 6*    INR 06/29/2018 1 13        Imaging Studies: I have personally reviewed pertinent imaging studies

## 2018-06-29 NOTE — PROGRESS NOTES
Progress Note - Mellisa Auguste 1960, 61 y o  male MRN: [id number]    Unit/Bed#: [facility] 627-01 Encounter: [id number]    Primary Care Provider: Chace Smith DO   Date and time admitted to hospital: 6/26/2018 11:01 AM        Lymphadenopathy   Assessment & Plan    · CT scan of the chest abdomen and pelvis on June 2018 showed left paratracheal lymph nodes measuring 1 3 x 0 8 cm, left internal mammary lymph nodes measuring 1 3 x 0 9 cm, AP window lymph node measuring 1 4 x 1 2 cm, no evidence of pericardial effusion, no evidence of masses in the liver or the spleen or the pancreas  Borderline enlarged lymph nodes at the portal region  · Discussed with heme/onc, consider EBUS pending results of EGD tomorrow  · Believes Dr Carol Enamorado has discs of images  If unable to access these images may need to repeat ct scan  · Appreciate oncology input         Myopathy   Assessment & Plan    · Starting approximately February of 2017 after a flu-like illness, tx with zpack with reported reaction including rash  · Reported myalgia, weakness in muscles (generalized)   with atrophy that is getting worse over the past few months, difficulty swallowing  After his evaluation he was noted to have lymphadenopathy and was referred to Dr Carol Enamorado  · He was evaluated at [facility] in [place] by Rheumatology and Neurology, workup was negative for serum protein electrophoresis, immunofixation, rheumatoid factor however CRP was elevated, CPK was low, vitamin B12, TSH, T4, REBECCA, HIV, CMV, DNA antibodies, aldolase, Sjogren antibodies, Boland antibodies, ACR H receptor with negative  sed rate was elevated at 31, Abby-Barr virus IgG positive however the IgM was negative   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 [facility]  · He had EMG x 2 showed 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, 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   · Biopsy revealed 2 myofiber atrophy of moderately severe degree  · - witnessed neuro exam with no real change from prior exam doc by neuro dr chin  · MRI of the cervical spine showed multilevel degenerative changes no evidence of spinal canal stenosis or cord impingement  · CT scan of the chest abdomen and pelvis on June 2018 showed left paratracheal lymph nodes measuring 1 3 x 0 8 cm, left internal mammary lymph nodes measuring 1 3 x 0 9 cm, AP window lymph node measuring 1 4 x 1 2 cm, no evidence of pericardial effusion, no evidence of masses in the liver or the spleen or the pancreas  Borderline enlarged lymph nodes at the portal region  · Has received corticosteroids with improvement and Plaquenil without improvement  · muscle biopsy inconclusive   · Tomorrow placed consult to ir (lovenox on hold) for LP see neuro note and Lymph node biopsy  · Follows with [facility] in [place] currently no diagnosis has been established   · He reports that his next evaluation will be a lumbar puncture with CSF studies with neurologist Dr Marleen Cat this Friday (which we will do tomorrow and then start IVIG tx as prev discussed with Dr Leandro Farias (neuromuscular specialist)  · Previously evaluated by Rheumatology Dr Mcdonnell? 6-8 mo ago   · To be seen by rheum later this evening         Oral candidiasis   Assessment & Plan    · Although previously on low dose steroids , question immunnosuppressive state  · nystatin swish swallow  · Gastroenterology input appreciated, EGD to evaluate for esophageal candidiasis  , however , although noted on tongue pt noted to have esophagitis   · - continue ppi         Knee effusion, right   Assessment & Plan    · New onset, nontraumatic, unknown etiology  Recently had right ankle swelling that resolved     · Appreciate ortho input, s/p joint aspiration 6/26, no crystals seen  · R knee XR negative         * Dysphagia   Assessment & Plan    · Esophageal dysphagia x 3 months of intermittent sx with solids and pills, no regurgitation, no issue with liquids   · Difficulty swallowing solids and has to drink liquids assist with swallowing     · Likely secondary to his unspecified myopathy versus Candida esophagitis  · Cleared by speech for reg diet, no oropharyngeal dysfunction, recommend reg diet with thins  · Continue nystatin swish and swallow  ·  EGD revealed some esophagitis continue bid ppi biopsy performed   ·             VTE Pharmacologic Prophylaxis:   Pharmacologic: Enoxaparin (Lovenox) on hold tomorrow am due to procedure   Mechanical VTE Prophylaxis in Place: Yes    Patient Centered Rounds: I have performed bedside rounds with nursing staff today  Discussions with Specialists or Other Care Team Provider: nursing     Education and Discussions with Family / Patient: patient     Time Spent for Care: 2 - 3 hours on whole case   More than 50% of total time spent on counseling and coordination of care as described above  Current Length of Stay: 3 day(s)    Current Patient Status: Inpatient   Certification Statement: The patient will continue to require additional inpatient hospital stay due to ongoiing treatment ir tomorrow for biopsy and lp     Discharge Plan: depending on eval by pt     Code Status: Level 1 - Full Code      Subjective:   Over two hours spent in pts room with neurology with extensive examination and questioning in regards to clarification of pts symptoms and map of care pt has already received  Pain all over pt patient  Objective:     Vitals:   Temp (24hrs), Av 3 °F (36 8 °C), Min:97 9 °F (36 6 °C), Max:98 6 °F (37 °C)    HR:  [71-90] 71  Resp:  [16-18] 18  BP: ()/(58-83) 112/71  SpO2:  [96 %-99 %] 99 %  Body mass index is 22 87 kg/m²  Input and Output Summary (last 24 hours):        Intake/Output Summary (Last 24 hours) at 18 3902  Last data filed at 18 0312   Gross per 24 hour   Intake              710 ml   Output              950 ml   Net             -240 ml Physical Exam:     Physical Exam   Constitutional: No distress  HENT:   limited rom in head and neck    Cardiovascular: Normal rate and regular rhythm  Exam reveals no gallop and no friction rub  No murmur heard  Pulmonary/Chest: No respiratory distress  He has no wheezes  He has no rales  He exhibits no tenderness  Musculoskeletal: He exhibits no edema, tenderness or deformity  Skin: He is not diaphoretic  Additional Data:     Labs:      Results from last 7 days  Lab Units 06/26/18  1122   WBC Thousand/uL 11 03*   HEMOGLOBIN g/dL 13 0   HEMATOCRIT % 41 5   PLATELETS Thousands/uL 426*   NEUTROS PCT % 67   LYMPHS PCT % 18   MONOS PCT % 12   EOS PCT % 1       Results from last 7 days  Lab Units 06/26/18  1122   SODIUM mmol/L 137   POTASSIUM mmol/L 4 2   CHLORIDE mmol/L 101   CO2 mmol/L 31   BUN mg/dL 15   CREATININE mg/dL 0 68   CALCIUM mg/dL 10 0   TOTAL PROTEIN g/dL 8 7*   BILIRUBIN TOTAL mg/dL 0 41   ALK PHOS U/L 113   ALT U/L 30   AST U/L 21   GLUCOSE RANDOM mg/dL 79                     * I Have Reviewed All Lab Data Listed Above  * Additional Pertinent Lab Tests Reviewed:  All Labs Within Last 24 Hours Reviewed    Imaging:    Imaging Reports Reviewed Today Include: reviewed     Recent Cultures (last 7 days):       Results from last 7 days  Lab Units 06/26/18  1638   GRAM STAIN RESULT  3+ Polys  No bacteria seen   BODY FLUID CULTURE, STERILE  No growth       Last 24 Hours Medication List:     Current Facility-Administered Medications:  acetaminophen 650 mg Oral Q6H PRN Vasile Peacock MD   [START ON 7/2/2018] enoxaparin 40 mg Subcutaneous Q24H [facility] MARK Burk   lidocaine (PF) 10 mL Infiltration Once Annemarie Garcia MD   magnesium hydroxide 30 mL Oral Daily PRN Vasile Peacock MD   nystatin 500,000 Units Swish & Swallow 4x Daily Vasile Peacock MD   ondansetron 4 mg Intravenous Q6H PRN Vasile Peacock MD   pantoprazole 40 mg Oral BID Heidi Mirza MD        Today, Patient Was Seen By: MARK Guo    ** Please Note: Dictation voice to text software may have been used in the creation of this document   **

## 2018-06-29 NOTE — DISCHARGE SUMMARY
Discharge Summary - [address] [facility]    Patient Information: Albert Mast 61 y o  male MRN: [id number]  Unit/Bed#: [facility] 627-01 Encounter: [id number]    Discharging Physician / Practitioner: MARK Castillo  PCP: Leonidas Segovia DO  Admission Date: 6/26/2018  Discharge Date: 06/29/18    Disposition:     Home    Reason for Admission: Progressive esophagitis     Discharge Diagnoses:     Principal Problem:    Dysphagia  Active Problems:    Myopathy    Lymphadenopathy    Knee effusion, right    Oral candidiasis  Resolved Problems:    * No resolved hospital problems  *      Consultations During Hospital Stay:  · Dr Saima Gomez neurology  · Dr Ramón Herrera  · Dr Adan Higuera oncology  · Dr Ailyn Velazquez orthopedics    Procedures Performed:     · CDF studies with negative comprehensive pcr  · CSF culture negative   · CSF total protein was 53  · ACE from CSF in process  · IGI synthesis of CSF fluid in process  · Lumbar puncture image: successful  · Right knee: No acute osseous abnormality  ·   Significant Findings / Test Results:     · See above     Incidental Findings:   · None     Test Results Pending at Discharge (will require follow up): · Above mentioned csf fluid results pending      Outpatient Tests Requested:  · Follow up with pcp in one week   · Follow up with his neurologist as scheduled     Complications:  none    Hospital Course:     Albert Mast is a 61 y o  male patient who originally presented to the hospital on 6/26/2018 due to progressive dysphagia  Pt has an extensive history already determined and also still being worked up  Pt has a proximal myopathy under work up by his neuromuscular physician Dr Karina Umaña at [facility]  He had been send for a chest CT when there were findings of lymphadenopathy along with dysphagia for which he was admitted to the hospital   He was seen by gi on admission and underwent an egd which demonstrated a mild LA class B esophagitis  BIopsies were performed for eosinophilic esophagitis  He should follow up with  Gi as an outpt for mgmt of heartburn and further evaluation  He should continue to take PPI 20mg bid for the next 2 - 3 months  If he continue with this problem she should follow up with an outpt manometry for further evaluation  Pt was then seen by Dr Leonides Thakkar oncology who were following him for the enlarged lymph nodes, and a 30 pound weight loss over the last year  Orthopedics had seen him in regards to his history of polyarthralgias  Pt was noted to have an effusions with pain which was aspirated with no further surgical intervention per ortho  Neurology were consulted to see pt however upon review Dr Cj Vang noted that 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 Systemic illness vs paraneoplastic disease  Prior plan per pts physician was to have an LP performed  After a very long discussion between services and the patient and his wife that an LP would be performed with the necessary test being sent out for review  Pt was offered a trial of IVIG to help with his symptoms but eventually it was determined that the pt should undergo this treatment through Dr Jessica Michelle facility in order to manage all future treatments  Pt was in agreement to do this  Pt was also seen by our Rheumatologist (please read full report)   He agreed with Neurology work up  Recommending rl lyme western blot, anca testing C3, C4, total complement, myositis antibody panel which should include some neoplasm associated antibodies, repeat REBECCA, RF, quantitative immunoglobulins and check for IgG 4 disease  Also check QuantiFERON gold test for TB  He felt that biopsy may demonstrated a reactive process  He did favor a course of steroids starting at 40mg a day or higher if the lyme test was negative  He recognized that neuro wanted to start out with IVIG treatment  Pt should follow up with his current team to ensure completion and appropriate care since they know him well and are aware of the workup pt has undergone this fare  Pt was supposed to see his neuro doctor ilia guan for ongoing care  Please see full work up and review outstanding labs and csf studies still in process at this time     Condition at Discharge: fair     Discharge Day Visit / Exam:     Subjective:  Pt is doing ok  He still feels weak and understands and requests that he has final treatment through his neurology team  He has no pain at this time  Vitals: Blood Pressure: 134/72 (06/29/18 1148)  Pulse: 71 (06/29/18 1148)  Temperature: 98 6 °F (37 °C) (06/29/18 1148)  Temp Source: Oral (06/29/18 1148)  Respirations: 20 (06/29/18 1148)  Height: 5' 7" (170 2 cm) (06/26/18 1542)  Weight - Scale: 66 2 kg (146 lb) (06/26/18 1542)  SpO2: 99 % (06/29/18 1148)  Exam:   Physical Exam   Constitutional: He is oriented to person, place, and time  No distress  HENT:   Head: Normocephalic and atraumatic  Eyes: Right eye exhibits no discharge  Left eye exhibits no discharge  No scleral icterus  Neck: No JVD present  No tracheal deviation present  No thyromegaly present  Cardiovascular: Normal rate  Exam reveals no gallop and no friction rub  No murmur heard  Pulmonary/Chest: No stridor  No respiratory distress  He has no wheezes  He has no rales  He exhibits no tenderness  Abdominal: He exhibits no distension and no mass  There is no tenderness  There is no rebound and no guarding  Musculoskeletal: He exhibits no edema, tenderness or deformity  Severe lower extremity atophy noted    Lymphadenopathy:     He has no cervical adenopathy  Neurological: He is oriented to person, place, and time  Skin: No rash noted  He is not diaphoretic  No erythema  No pallor  Psychiatric: He has a normal mood and affect         Discussion with Family: today sister is at bedside /yesterday his wife and daughter at bedside     Discharge instructions/Information to patient and family:   See after visit summary for information provided to patient and family  Provisions for Follow-Up Care:  See after visit summary for information related to follow-up care and any pertinent home health orders  Planned Readmission: no plan      Discharge Statement:  I spent  60 minutes discharging the patient  This time was spent on the day of discharge  I had direct contact with the patient on the day of discharge  Greater than 50% of the total time was spent examining patient, answering all patient questions, arranging and discussing plan of care with patient as well as directly providing post-discharge instructions  Additional time then spent on discharge activities  Discharge Medications:  See after visit summary for reconciled discharge medications provided to patient and family        ** Please Note: This note has been constructed using a voice recognition system **

## 2018-06-29 NOTE — PHYSICAL THERAPY NOTE
Physical Therapy Cancellation Note    PT currently off floor for lumbar puncture  PT will follow and eval as medically appropriate    Nayeli Flores, PT

## 2018-06-30 LAB

## 2018-07-01 LAB
ANNOTATION COMMENT IMP: NORMAL
GAMMA INTERFERON BACKGROUND BLD IA-ACNC: 0.03 IU/ML
M TB IFN-G BLD-IMP: NEGATIVE
M TB IFN-G CD4+ BCKGRND COR BLD-ACNC: <0.01 IU/ML
M TB IFN-G CD4+ T-CELLS BLD-ACNC: 0.02 IU/ML
MITOGEN IGNF BLD-ACNC: 4.03 IU/ML
QUANTIFERON-TB GOLD IN TUBE: NORMAL
SERVICE CMNT-IMP: NORMAL

## 2018-07-02 ENCOUNTER — TRANSITIONAL CARE MANAGEMENT (OUTPATIENT)
Dept: FAMILY MEDICINE CLINIC | Facility: CLINIC | Age: 58
End: 2018-07-02

## 2018-07-02 LAB
ACE SERPL-CCNC: 31 U/L (ref 14–82)
ALB CSF/SERPL: 7 {RATIO} (ref 0–8)
ALBUMIN CSF-MCNC: 27 MG/DL (ref 11–48)
ALBUMIN SERPL-MCNC: 4 G/DL (ref 3.5–5.5)
BACTERIA CSF CULT: NO GROWTH
CH50 SERPL-ACNC: >60 U/ML
IGG CSF-MCNC: 4.5 MG/DL (ref 0–8.6)
IGG SERPL-MCNC: 1257 MG/DL (ref 700–1600)
IGG SERPL-MCNC: 1305 MG/DL (ref 700–1600)
IGG SYNTH RATE SER+CSF CALC-MRATE: -1.9 MG/DAY
IGG/ALB CLEAR SER+CSF-RTO: 0.5 (ref 0–0.7)
IGG/ALB CSF: 0.17 {RATIO} (ref 0–0.25)
IGG1 SER-MCNC: 852 MG/DL (ref 248–810)
IGG2 SER-MCNC: 312 MG/DL (ref 130–555)
IGG3 SER-MCNC: 74 MG/DL (ref 15–102)
IGG4 SER-MCNC: 23 MG/DL (ref 2–96)
RHEUMATOID FACT SER QL LA: NEGATIVE
RYE IGE QN: NEGATIVE

## 2018-07-03 LAB
B BURGDOR IGG PATRN CSF IB-IMP: NEGATIVE
B BURGDOR IGG SER IA-ACNC: 0.45
B BURGDOR IGM PATRN CSF IB-IMP: NEGATIVE
B BURGDOR IGM SER IA-ACNC: 0.14
B BURGDOR18KD IGG CSF QL IB: ABNORMAL
B BURGDOR23KD IGG CSF QL IB: ABNORMAL
B BURGDOR23KD IGM CSF QL IB: ABNORMAL
B BURGDOR28KD IGG CSF QL IB: ABNORMAL
B BURGDOR30KD IGG CSF QL IB: ABNORMAL
B BURGDOR39KD IGG CSF QL IB: ABNORMAL
B BURGDOR39KD IGM CSF QL IB: ABNORMAL
B BURGDOR41KD IGG CSF QL IB: PRESENT
B BURGDOR41KD IGM CSF QL IB: ABNORMAL
B BURGDOR45KD IGG CSF QL IB: ABNORMAL
B BURGDOR58KD IGG CSF QL IB: ABNORMAL
B BURGDOR66KD IGG CSF QL IB: ABNORMAL
B BURGDOR93KD IGG CSF QL IB: ABNORMAL
CMV DNA SERPL NAA+PROBE-ACNC: NEGATIVE IU/ML
CMV DNA SERPL NAA+PROBE-LOG IU: NORMAL LOG10 IU/ML
OLIGOCLONAL BANDS.IT SER+CSF QL: NORMAL

## 2018-07-04 LAB
C-ANCA TITR SER IF: NORMAL TITER
MYELOPEROXIDASE AB SER IA-ACNC: <9 U/ML (ref 0–9)
P-ANCA ATYPICAL TITR SER IF: NORMAL TITER
P-ANCA TITR SER IF: NORMAL TITER
PROTEINASE3 AB SER IA-ACNC: <3.5 U/ML (ref 0–3.5)

## 2018-07-05 ENCOUNTER — TELEPHONE (OUTPATIENT)
Dept: HEMATOLOGY ONCOLOGY | Facility: CLINIC | Age: 58
End: 2018-07-05

## 2018-07-05 NOTE — CASE MANAGEMENT
Notification of Inpatient Admission/Inpatient Authorization Request  This is a Notification of Inpatient Admission/Request for Inpatient Authorization to our facility [facility]  Please be advised that this patient is currently in our facility under Inpatient Status  Below you will find the Attending Physician and Facilitys information including NPI# and contact information for the Utilization  assigned to the [facility] where the patient is receiving services  Please feel free to contact the Utilization Review Department with any questions  Patient Information:  PATIENT NAME: Kat Hung  MRN: [id number]  YOB: 1960    PRESENTATION DATE: 6/26/2018 11:01 AM  IP ADMISSION DATE: 6/26/18 1233  DISCHARGE DATE: 6/29/2018  5:27 PM   DISPOSITION: Home/Self Care    Attending Physician:  SUMMER Hopper  [facility] Practitioner ID- [id number]  [address] [place] [address], [address] [place] [address]  Phone 1: [phone]  Fax: [phone]  Ari Banuelos RN Utilization Manager Signed   Case Management Date of Service: 6/27/2018  9:38 AM           Initial Clinical Review     Thank you,  [facility] in the [facility] by Donal Vega for 2017  Network Utilization Review Department  Phone: [phone]; Fax [phone]  ATTENTION: The Network Utilization Review Department is now centralized for our 7 Facilities  Make a note that we have a new phone and fax numbers for our Department  Please call with any questions or concerns to [phone] and carefully follow the prompts so that you are directed to the right person  All voicemails are confidential  Fax any determinations, approvals, denials, and requests for initial or continue stay review clinical to [phone]   Due to HIGH CALL volume, it would be easier if you could please send faxed requests to expedite your requests and in part, help us provide discharge notifications faster      Admission: Date/Time/Statement: 6/26/18 @ 1233            Orders Placed This Encounter   Procedures    Inpatient Admission (expected length of stay for this patient is greater than two midnights)       Standing Status:   Standing       Number of Occurrences:   1       Order Specific Question:   Admitting Physician       Answer:   Bib Ayoub [[id number]]       Order Specific Question:   Level of Care       Answer:   Med Surg [16]       Order Specific Question:   Estimated length of stay       Answer:   More than 2 Midnights       Order Specific Question:   Certification       Answer:   I certify that inpatient services are medically necessary for this patient for a duration of greater than two midnights  See H&P and MD Progress Notes for additional information about the patient's course of treatment  ED: Date/Time/Mode of Arrival:             ED Arrival Information      Expected Arrival Acuity Means of Arrival Escorted By Service Admission Type     6/26/2018 10:43 6/26/2018 10:54 Urgent Walk-In Self General Medicine Urgent     Arrival Complaint     weakness        Chief Complaint:        Chief Complaint   Patient presents with    Weakness - Generalized       Pt presents with generalized weakness and weight loss  Pt sent here by ocologist     Weight Loss   History of Illness: Vangie Cortez a 61 y  o  male who presents with progressive dysphagia to solids   The patient has a history of a proximal myopathy which at this point has been undiagnosed   He was following up  for lymphadenopathy who recommended an inpatient evaluation for dysphagia   Reports having to take liquids with solids to help assist with swallowing   He reports a 30 lb weight loss over past year   Denies any change in appetite   He also reports that he has developed a new onset of right knee pain with swelling   He does report having night sweats       ED Vital Signs:            ED Triage Vitals [06/26/18 1059]   Temperature Pulse Respirations Blood Pressure SpO2   (!) 96 8 °F (36 °C) 80 17 132/79 99 % RA sat       Temp Source Heart Rate Source Patient Position - Orthostatic VS BP Location FiO2 (%)   Tympanic Monitor Sitting Left arm --       Pain Score           4                Wt Readings from Last 1 Encounters:   06/26/18 66 2 kg (146 lb)   Abnormal Labs/Diagnostic Test Results: 6/26 - Wbc 11 03 - Plt 426 - T Protein 8 7  Sed Rate 83  Synovial Fluid -  6/26 - WBC - 94875 - RBC 84 -  Gram stain - no bacteria seen - no crystals      XR right Knee  pending     ED Treatment:            Medication Administration from 06/26/2018 1043 to 06/26/2018 1501        Date/Time Order Dose Route       06/26/2018 1123 sodium chloride 0 9 % bolus 1,000 mL 1,000 mL Intravenous   Past Medical/Surgical History:        Past Medical History:   Diagnosis Date    Leg pain      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    Neoplasm of skin     Admitting Diagnosis: Myalgia [M79 1]  Lymphadenopathy [R59 1]  Weakness [R53 1]  Weight loss [R63 4]  Knee effusion, right [M25 461]  Dysphagia [R13 10]  Muscular atrophy, unspecified site [M62 50]     Age/Sex: 61 y o  male     Assessment/Plan:       Dysphagia   Assessment & Plan     New onset over the past few weeks  Difficulty swallowing solids and has to drink liquids assist with swallowing  Denies any dysphagia with liquids  Likely secondary to his unspecified myopathy versus Candida esophagitis  Check swallow evaluation  Start nystatin swish and swallow  Consult Gastroenterology for possible endoscopy           Knee effusion, right   Assessment & Plan     New onset  Denies any history gout or injury to the right knee  He will likely require an arthrocentesis with fluid studies to evaluate infectious versus inflammatory arthritis    Consult Orthopedics for aspiration           Lymphadenopathy   Assessment & Plan     CT scan of the chest abdomen and pelvis on June 2018 showed left paratracheal lymph nodes measuring 1 3 x 0 8 cm, left internal mammary lymph nodes measuring 1 3 x 0 9 cm, AP window lymph node measuring 1 4 x 1 2 cm, no evidence of pericardial effusion, no evidence of masses in the liver or the spleen or the pancreas   Borderline enlarged lymph nodes at the portal region  At this time lymph nodes are likely too small to biopsy  Likely follow up with Oncology as an outpatient to arrange PET scan to evaluate for abnormal metabolic activity           Muscle atrophy   Assessment & Plan     Starting approximately February of 2017 after a flu-like illness  [facility] states that he took azithromycin and had an allergic reaction, consisting of a rash, and has had progressive proximal muscle weakness since then  [facility] his evaluation he was noted to have lymphadenopathy and was referred to Dr Jaspal Rodrigues recommended inpatient evaluation for his dysphagia  "Follows with Graciela any preceding [place] (for 2nd opinion) currently no diagnosis has been established after serological studies, EMG testing, muscle biopsy  He reports that his next evaluation will be a lumbar puncture with CSF studies at [facility] with neurologist Dr Javy Cobian approximately 2 weeks  [facility] is requesting  this evaluation be done while hospitalized here           Oral candidiasis   Assessment & Plan     Start a statin swish swallow  Gastroenterology to evaluate for possible EGD to evaluate for esophageal candidiasis          Admission Orders:  Scheduled Meds:   Current Facility-Administered Medications:  acetaminophen 650 mg Oral Q6H PRN   lidocaine (PF) 10 mL Infiltration Once   magnesium hydroxide 30 mL Oral Daily PRN   nystatin 500,000 Units Swish & Swallow 4x Daily   ondansetron 4 mg Intravenous Q6H PRN      Nursing orders - VS q 4 - up and OOB as tolerated - Ambulate q shift - diet dysphagia - mechanical soft - thin liquids      Orthopedics - right knee effusion   Differential includes inflammatory process including gout or psuedogout, myopathy, or infectious process which is lowest on the differential  Weight bearing as tolerated -  Aspiration R knee - ( see labs ) - ( 80 cc of fluid removed  On 6/26 )      Medical Oncology - plan biopsy - GI eval for dysphagia  Of solids

## 2018-07-05 NOTE — TELEPHONE ENCOUNTER
PT'S WIFE STATED THAT PT WAS I/P FOR 4 DAYS LAST WEEK  WAS RELEASED ON Thursday, BUT IS WONDERING ABOUT WHAT THE NEXT STEP WILL BE   THEY FEEL LIKE THEY WERE "LEFT HANGING"  PT HAD A LOT OF TESTS DONE, ARE THOSE RESULTS BACK AS THEY HAVEN'T HEARD ANY RESULTS OF TESTING  WHAT SHOULD THEY DO NEXT?   PLEASE CALL TO DISCUSS, TXS

## 2018-07-05 NOTE — TELEPHONE ENCOUNTER
Patient has f/u appt with dr Zeke Kendrick on 7/27/18  Will need to come in sooner as hospital f/u  Please schedule    Thanks

## 2018-07-06 ENCOUNTER — OFFICE VISIT (OUTPATIENT)
Dept: HEMATOLOGY ONCOLOGY | Facility: CLINIC | Age: 58
End: 2018-07-06
Payer: COMMERCIAL

## 2018-07-06 VITALS
OXYGEN SATURATION: 98 % | TEMPERATURE: 98.6 F | SYSTOLIC BLOOD PRESSURE: 116 MMHG | RESPIRATION RATE: 18 BRPM | HEIGHT: 67 IN | DIASTOLIC BLOOD PRESSURE: 74 MMHG | WEIGHT: 143 LBS | BODY MASS INDEX: 22.44 KG/M2 | HEART RATE: 88 BPM

## 2018-07-06 DIAGNOSIS — G72.9 MYOPATHY: ICD-10-CM

## 2018-07-06 DIAGNOSIS — R59.1 LYMPHADENOPATHY: Primary | ICD-10-CM

## 2018-07-06 LAB — MISCELLANEOUS LAB TEST RESULT: NORMAL

## 2018-07-06 PROCEDURE — 99215 OFFICE O/P EST HI 40 MIN: CPT | Performed by: INTERNAL MEDICINE

## 2018-07-06 NOTE — LETTER
July 6, 2018     Uri Neely, [address] [facility] [address]    Patient: Harish Garcia   YOB: 1960   Date of Visit: 7/6/2018       Dear Dr Val Pradhan: Thank you for referring Harish Garcia to me for evaluation  Below are my notes for this consultation  If you have questions, please do not hesitate to call me  I look forward to following your patient along with you  Sincerely,        Claudia Bhatt MD        CC: MD Hayder Ashton MD Candise Fray, MD  7/6/2018  5:06 PM  Sign at close encounter  Hematology Outpatient Follow - Up Note  Harish Garcia 61 y o  male MRN: @ Encounter: [id number]        Date:  7/6/2018        Assessment/ Plan:  1  Atrophy of the muscles, fusion of the right knee, arthralgia, elevated CRP, since February 2017, he was evaluated at [facility] in [facility] by Rheumatology and Urology, workup was negative except for elevation of sed rate, CRP, muscle biopsy was not conclusive for type 2 myositis, he was treated intermittently with corticosteroids and Plaquenil  With worsening of the symptoms in the past 2 months, night sweats, CT scan showed lymphadenopathy in the hilar, mammary, lillian hepaticus area measuring up to 1 4 centimeter, evaluated again at [facility] and they recommended CT/PET scan to rule out lymphoma  2  The patient was admitted to [facility] in June 2018, status post LP, EGD showed candidal esophagitis, he was treated with Diflucan, now with resolution of dysphagia, he will follow up with Rheumatology and neurology  3  Patient to have IVIG 35 gram every other day for the next 5 days with Solu-Cortef 100 milligram IV prior to each IVIG and Tylenol 650 by recommendation of Neurology in [facility] as well as [facility] in your   4   Will order CT/ PET scan to rule out lymphoproliferative disorder associated with autoimmune disorders by recommendation of [facility] in [place] hopefully in the next 6-8 weeks after finishing IVIG especially if the patient still have weight loss, intermittent night sweats              HPI: 76-year-old  male who in february 2017 reported myalgia, weakness more in the proximal than the distal muscles with atrophy that is getting worse over the past few months, difficulty swallowing for liquid, he had an EMG indicative of chronic myopathy process however no active myositis, he was evaluated at [facility] in [place] by Rheumatology and Neurology, workup was negative for serum protein electrophoresis, immunofixation, rheumatoid factor however CRP was elevated, CPK was low, vitamin B12, TSH, T4, REBECCA, HIV, CMV, DNA antibodies, aldolase, Sjogren antibodies, Boland antibodies, ACR H receptor with negative  He sed rate was elevated at 31, Abby-Barr virus IgG positive however the IgM was negative  He had EMG on November 2017 showed 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  He was evaluated by neurology at [facility] scheduled to have LP  And MRI of the cervical spine showed multilevel degenerative changes no evidence of spinal canal stenosis or cord impingement  CT scan of the chest abdomen and pelvis on June 2018 showed left paratracheal lymph nodes measuring 1 3 x 0 8 cm, left internal mammary lymph nodes measuring 1 3 x 0 9 cm, AP window lymph node measuring 1 4 x 1 2 cm, no evidence of pericardial effusion, no evidence of masses in the liver or the spleen or the pancreas   Borderline enlarged lymph nodes at the portal region  Blood work on April 2018 showed no evidence of anemia, leukopenia or leukocytosis, normal differential, aldolase 3 9, CPK 29, C-reactive protein 41 5, PTT 32   He told me he received his in the past corticosteroids with improvement he received also Plaquenil without improvement  In the past 2 months the patient start having dysphagia to solids, 20 lb weight loss in 2 months, night sweats, fatigue, muscle pain, right knee pain with effusion  He could not turn his head, reported fatigue in the afternoon  Quit smoking 35 years ago, he drinks alcohol intermittently  Family history significant for sister with ovarian and breast cancer, the family does not know about BRCA1/ 2 mutation     Interval History: he was admitted to the hospital at the day of the initial office visit, EGD showed candidal esophagitis, no evidence of malignancy, no evidence of CMV, he had a lumbar puncture with extensive workup on the lumbar puncture came negative for malignancy, no evidence of increased leukocytes, lymphocytes  He was evaluated by Neurology and Rheumatology, batteries of workup were ordered, he had arthrocentesis of the right knee, with leukocytosis, no evidence of bacteria, culture was negative, no evidence of crystal arthropathy             Test Results:    Imaging: Xr Knee 1 Or 2 Vw Right    Result Date: 6/27/2018  Narrative: RIGHT KNEE INDICATION:   Right knee pain  COMPARISON:  None VIEWS:  XR KNEE 1 OR 2 VW RIGHT FINDINGS: There is no acute fracture or dislocation  There is no joint effusion  No significant degenerative changes  No lytic or blastic lesions are seen  Soft tissues are unremarkable  Impression: No acute osseous abnormality  Workstation performed: [id number]     Fl Lumbar Puncture    Result Date: 6/29/2018  Narrative: FLUOROSCOPICALLY GUIDED LUMBAR PUNCTURE  INDICATION:  Myopathy  FLUOROSCOPY TIME:   16 minutes IMAGES:  6    TECHNIQUE:   Consent was obtained after fully explaining the procedure to the patient  Risks and benefits of procedure were described and understood  Precautions to avoid spinal headache were reviewed  1% lidocaine was infiltrated at the puncture site    Utilizing left paravertebral approach, a 20 gauge spinal needle was advanced under fluoroscopic guidance into the subarachnoid space at the L1 - L2 level, utilizing sterile technique  Once in position, approximately 22 cc of clear, colorless CSF were removed and placed into 4 tubes which subsequently were transported to the lab for requested analysis  Opening pressure: 13 cm H20  The needle was removed  The patient tolerated the procedure well  The patient was discharged from the department with appropriate instructions  Impression: Successful fluoroscopically guided lumbar puncture  The above findings and procedure were reviewed with Dr Shavon Aleman  Procedure was performed by Jaelyn Bowers PA-C under the direct supervision of Dr Brice Colmenares  Workstation performed: [id number]       Labs:   Lab Results   Component Value Date    WBC 11 03 (H) 06/26/2018    HGB 13 0 06/26/2018    HCT 41 5 06/26/2018    MCV 93 06/26/2018     (H) 06/26/2018     Lab Results   Component Value Date     06/26/2018    K 4 2 06/26/2018     06/26/2018    CO2 31 06/26/2018    ANIONGAP 5 06/26/2018    BUN 15 06/26/2018    CREATININE 0 68 06/26/2018    GLUCOSE 79 06/26/2018    CALCIUM 10 0 06/26/2018    AST 21 06/26/2018    ALT 30 06/26/2018    ALKPHOS 113 06/26/2018    PROT 8 7 (H) 06/26/2018    BILITOT 0 41 06/26/2018    EGFR 105 06/26/2018       No results found for: IRON, TIBC, FERRITIN    No results found for: [id number]      ROS:   Review of Systems   Constitutional: Positive for fatigue  Negative for activity change, appetite change, chills, diaphoresis, fever and unexpected weight change  HENT: Negative for congestion, dental problem, facial swelling, hearing loss, mouth sores, nosebleeds, postnasal drip, rhinorrhea, sore throat, trouble swallowing and voice change  Eyes: Negative for photophobia, pain, discharge, redness, itching and visual disturbance  Respiratory: Negative for cough, choking, chest tightness, shortness of breath and wheezing  Cardiovascular: Negative for chest pain, palpitations and leg swelling     Gastrointestinal: Negative for abdominal distention, abdominal pain, anal bleeding, blood in stool, constipation, diarrhea, nausea, rectal pain and vomiting  Endocrine: Negative for cold intolerance and heat intolerance  Genitourinary: Negative for decreased urine volume, difficulty urinating, dysuria, flank pain, frequency, hematuria and urgency  Musculoskeletal: Negative for arthralgias, back pain, gait problem, joint swelling, myalgias, neck pain and neck stiffness  Skin: Negative for color change, pallor, rash and wound  Allergic/Immunologic: Negative for immunocompromised state  Neurological: Negative for dizziness, tremors, seizures, syncope, facial asymmetry, speech difficulty, weakness, light-headedness, numbness and headaches  Hematological: Negative for adenopathy  Does not bruise/bleed easily  Psychiatric/Behavioral: Negative for agitation, confusion, decreased concentration, dysphoric mood and sleep disturbance  The patient is not nervous/anxious  All other systems reviewed and are negative  Current Medications: Reviewed  Allergies: Reviewed  PMH/FH/SH:  Reviewed      Physical Exam:    Body surface area is 1 75 meters squared  Wt Readings from Last 3 Encounters:   07/06/18 64 9 kg (143 lb)   06/26/18 66 2 kg (146 lb)   06/26/18 66 2 kg (146 lb)        Temp Readings from Last 3 Encounters:   07/06/18 98 6 °F (37 °C)   06/29/18 98 7 °F (37 1 °C) (Oral)   06/26/18 97 9 °F (36 6 °C)        BP Readings from Last 3 Encounters:   07/06/18 116/74   06/29/18 111/82   06/26/18 104/70         Pulse Readings from Last 3 Encounters:   07/06/18 88   06/29/18 86   06/26/18 79        Physical Exam   Constitutional: He is oriented to person, place, and time  He appears well-developed and well-nourished  No distress  HENT:   Head: Normocephalic and atraumatic  Mouth/Throat: Oropharynx is clear and moist  No oropharyngeal exudate  Eyes: Conjunctivae and EOM are normal  Pupils are equal, round, and reactive to light     Neck: Normal range of motion  Neck supple  No tracheal deviation present  No thyromegaly present  Cardiovascular: Normal rate and regular rhythm  Exam reveals no gallop and no friction rub  No murmur heard  Pulmonary/Chest: Effort normal and breath sounds normal  No respiratory distress  He has no wheezes  He has no rales  He exhibits no tenderness  Abdominal: Soft  Bowel sounds are normal  He exhibits no distension and no mass  There is no tenderness  There is no rebound and no guarding  Musculoskeletal: Normal range of motion  He exhibits tenderness ( of the right knee)  He exhibits no edema  Lymphadenopathy:     He has no cervical adenopathy  Neurological: He is alert and oriented to person, place, and time  Skin: Skin is warm and dry  No rash noted  He is not diaphoretic  No erythema  No pallor  Psychiatric: He has a normal mood and affect  His behavior is normal  Judgment and thought content normal    Vitals reviewed  Goals and Barriers:  Current Goal: Minimize effects of disease  Barriers: None  Patient's Capacity to Self Care:  Patient is able to self care      Code Status: @[facility]@

## 2018-07-06 NOTE — PROGRESS NOTES
Hematology Outpatient Follow - Up Note  Kleber Myers 61 y o  male MRN: @ Encounter: [id number]        Date:  7/6/2018        Assessment/ Plan:  1  Atrophy of the muscles, fusion of the right knee, arthralgia, elevated CRP, since February 2017, he was evaluated at [facility] in [facility] by Rheumatology and Urology, workup was negative except for elevation of sed rate, CRP, muscle biopsy was not conclusive for type 2 myositis, he was treated intermittently with corticosteroids and Plaquenil  With worsening of the symptoms in the past 2 months, night sweats, CT scan showed lymphadenopathy in the hilar, mammary, lillian hepaticus area measuring up to 1 4 centimeter, evaluated again at [facility] and they recommended CT/PET scan to rule out lymphoma  2  The patient was admitted to [facility] in June 2018, status post LP, EGD showed candidal esophagitis, he was treated with Diflucan, now with resolution of dysphagia, he will follow up with Rheumatology and neurology  3  Patient to have IVIG 35 gram every other day for the next 5 days with Solu-Cortef 100 milligram IV prior to each IVIG and Tylenol 650 by recommendation of Neurology in [facility] as well as [facility] in [place]   4   Will order CT/ PET scan to rule out lymphoproliferative disorder associated with autoimmune disorders by recommendation of [facility] in [place] hopefully in the next 6-8 weeks after finishing IVIG especially if the patient still have weight loss, intermittent night sweats              HPI: 76-year-old  male who in february 2017 reported myalgia, weakness more in the proximal than the distal muscles with atrophy that is getting worse over the past few months, difficulty swallowing for liquid, he had an EMG indicative of chronic myopathy process however no active myositis, he was evaluated at [facility] in [place] by Rheumatology and Neurology, workup was negative for serum protein electrophoresis, immunofixation, rheumatoid factor however CRP was elevated, CPK was low, vitamin B12, TSH, T4, REBECCA, HIV, CMV, DNA antibodies, aldolase, Sjogren antibodies, Boland antibodies, ACR H receptor with negative  He sed rate was elevated at 31, Abby-Barr virus IgG positive however the IgM was negative  He had EMG on November 2017 showed 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  He was evaluated by neurology at [facility] scheduled to have LP  And MRI of the cervical spine showed multilevel degenerative changes no evidence of spinal canal stenosis or cord impingement  CT scan of the chest abdomen and pelvis on June 2018 showed left paratracheal lymph nodes measuring 1 3 x 0 8 cm, left internal mammary lymph nodes measuring 1 3 x 0 9 cm, AP window lymph node measuring 1 4 x 1 2 cm, no evidence of pericardial effusion, no evidence of masses in the liver or the spleen or the pancreas   Borderline enlarged lymph nodes at the portal region  Blood work on April 2018 showed no evidence of anemia, leukopenia or leukocytosis, normal differential, aldolase 3 9, CPK 29, C-reactive protein 41 5, PTT 32   He told me he received his in the past corticosteroids with improvement he received also Plaquenil without improvement  In the past 2 months the patient start having dysphagia to solids, 20 lb weight loss in 2 months, night sweats, fatigue, muscle pain, right knee pain with effusion  He could not turn his head, reported fatigue in the afternoon  Quit smoking 35 years ago, he drinks alcohol intermittently  Family history significant for sister with ovarian and breast cancer, the family does not know about BRCA1/ 2 mutation     Interval History: he was admitted to the hospital at the day of the initial office visit, EGD showed candidal esophagitis, no evidence of malignancy, no evidence of CMV, he had a lumbar puncture with extensive workup on the lumbar puncture came negative for malignancy, no evidence of increased leukocytes, lymphocytes  He was evaluated by Neurology and Rheumatology, batteries of workup were ordered, he had arthrocentesis of the right knee, with leukocytosis, no evidence of bacteria, culture was negative, no evidence of crystal arthropathy             Test Results:    Imaging: Xr Knee 1 Or 2 Vw Right    Result Date: 6/27/2018  Narrative: RIGHT KNEE INDICATION:   Right knee pain  COMPARISON:  None VIEWS:  XR KNEE 1 OR 2 VW RIGHT FINDINGS: There is no acute fracture or dislocation  There is no joint effusion  No significant degenerative changes  No lytic or blastic lesions are seen  Soft tissues are unremarkable  Impression: No acute osseous abnormality  Workstation performed: [id number]     Fl Lumbar Puncture    Result Date: 6/29/2018  Narrative: FLUOROSCOPICALLY GUIDED LUMBAR PUNCTURE  INDICATION:  Myopathy  FLUOROSCOPY TIME:   16 minutes IMAGES:  6    TECHNIQUE:   Consent was obtained after fully explaining the procedure to the patient  Risks and benefits of procedure were described and understood  Precautions to avoid spinal headache were reviewed  1% lidocaine was infiltrated at the puncture site  Utilizing left paravertebral approach, a 20 gauge spinal needle was advanced under fluoroscopic guidance into the subarachnoid space at the L1 - L2 level, utilizing sterile technique  Once in position, approximately 22 cc of clear, colorless CSF were removed and placed into 4 tubes which subsequently were transported to the lab for requested analysis  Opening pressure: 13 cm H20  The needle was removed  The patient tolerated the procedure well  The patient was discharged from the department with appropriate instructions  Impression: Successful fluoroscopically guided lumbar puncture  The above findings and procedure were reviewed with Dr Calixto Rodgers  Procedure was performed by Mary Anne Bowers PA-C under the direct supervision of Dr Tunde Padilla  Workstation performed: [id number]       Labs:   Lab Results   Component Value Date    WBC 11 03 (H) 06/26/2018    HGB 13 0 06/26/2018    HCT 41 5 06/26/2018    MCV 93 06/26/2018     (H) 06/26/2018     Lab Results   Component Value Date     06/26/2018    K 4 2 06/26/2018     06/26/2018    CO2 31 06/26/2018    ANIONGAP 5 06/26/2018    BUN 15 06/26/2018    CREATININE 0 68 06/26/2018    GLUCOSE 79 06/26/2018    CALCIUM 10 0 06/26/2018    AST 21 06/26/2018    ALT 30 06/26/2018    ALKPHOS 113 06/26/2018    PROT 8 7 (H) 06/26/2018    BILITOT 0 41 06/26/2018    EGFR 105 06/26/2018       No results found for: IRON, TIBC, FERRITIN    No results found for: [id number]      ROS:   Review of Systems   Constitutional: Positive for fatigue  Negative for activity change, appetite change, chills, diaphoresis, fever and unexpected weight change  HENT: Negative for congestion, dental problem, facial swelling, hearing loss, mouth sores, nosebleeds, postnasal drip, rhinorrhea, sore throat, trouble swallowing and voice change  Eyes: Negative for photophobia, pain, discharge, redness, itching and visual disturbance  Respiratory: Negative for cough, choking, chest tightness, shortness of breath and wheezing  Cardiovascular: Negative for chest pain, palpitations and leg swelling  Gastrointestinal: Negative for abdominal distention, abdominal pain, anal bleeding, blood in stool, constipation, diarrhea, nausea, rectal pain and vomiting  Endocrine: Negative for cold intolerance and heat intolerance  Genitourinary: Negative for decreased urine volume, difficulty urinating, dysuria, flank pain, frequency, hematuria and urgency  Musculoskeletal: Negative for arthralgias, back pain, gait problem, joint swelling, myalgias, neck pain and neck stiffness  Skin: Negative for color change, pallor, rash and wound  Allergic/Immunologic: Negative for immunocompromised state     Neurological: Negative for dizziness, tremors, seizures, syncope, facial asymmetry, speech difficulty, weakness, light-headedness, numbness and headaches  Hematological: Negative for adenopathy  Does not bruise/bleed easily  Psychiatric/Behavioral: Negative for agitation, confusion, decreased concentration, dysphoric mood and sleep disturbance  The patient is not nervous/anxious  All other systems reviewed and are negative  Current Medications: Reviewed  Allergies: Reviewed  PMH/FH/SH:  Reviewed      Physical Exam:    Body surface area is 1 75 meters squared  Wt Readings from Last 3 Encounters:   07/06/18 64 9 kg (143 lb)   06/26/18 66 2 kg (146 lb)   06/26/18 66 2 kg (146 lb)        Temp Readings from Last 3 Encounters:   07/06/18 98 6 °F (37 °C)   06/29/18 98 7 °F (37 1 °C) (Oral)   06/26/18 97 9 °F (36 6 °C)        BP Readings from Last 3 Encounters:   07/06/18 116/74   06/29/18 111/82   06/26/18 104/70         Pulse Readings from Last 3 Encounters:   07/06/18 88   06/29/18 86   06/26/18 79        Physical Exam   Constitutional: He is oriented to person, place, and time  He appears well-developed and well-nourished  No distress  HENT:   Head: Normocephalic and atraumatic  Mouth/Throat: Oropharynx is clear and moist  No oropharyngeal exudate  Eyes: Conjunctivae and EOM are normal  Pupils are equal, round, and reactive to light  Neck: Normal range of motion  Neck supple  No tracheal deviation present  No thyromegaly present  Cardiovascular: Normal rate and regular rhythm  Exam reveals no gallop and no friction rub  No murmur heard  Pulmonary/Chest: Effort normal and breath sounds normal  No respiratory distress  He has no wheezes  He has no rales  He exhibits no tenderness  Abdominal: Soft  Bowel sounds are normal  He exhibits no distension and no mass  There is no tenderness  There is no rebound and no guarding  Musculoskeletal: Normal range of motion   He exhibits tenderness ( of the right knee)  He exhibits no edema  Lymphadenopathy:     He has no cervical adenopathy  Neurological: He is alert and oriented to person, place, and time  Skin: Skin is warm and dry  No rash noted  He is not diaphoretic  No erythema  No pallor  Psychiatric: He has a normal mood and affect  His behavior is normal  Judgment and thought content normal    Vitals reviewed  Goals and Barriers:  Current Goal: Minimize effects of disease  Barriers: None  Patient's Capacity to Self Care:  Patient is able to self care      Code Status: @[facility]@

## 2018-07-07 LAB — ACE CSF-CCNC: 0.3 U/L (ref 0–2.8)

## 2018-07-08 LAB — MISCELLANEOUS LAB TEST RESULT: NORMAL

## 2018-07-09 ENCOUNTER — TELEPHONE (OUTPATIENT)
Dept: HEMATOLOGY ONCOLOGY | Facility: MEDICAL CENTER | Age: 58
End: 2018-07-09

## 2018-07-10 LAB
EJ: NEGATIVE
ENA RNP AB SER-ACNC: 4.6 EU/ML
KU: NEGATIVE
MI-2 ANTIBODIES: NEGATIVE
OJ: NEGATIVE
PL-12: NEGATIVE
PL-7: NEGATIVE
SRP AB SERPL QL: NEGATIVE

## 2018-07-10 NOTE — CASE MANAGEMENT
MARK Andrade Nurse Practitioner Signed Hospitalist  Discharge Summaries Date of Service: 6/29/2018  3:20 PM         []Hide copied text  Discharge Summary - [address] [facility]     Patient Information: Zetta Severin 61 y o  male MRN: [id number]  Unit/Bed#: [facility] 627-01 Encounter: [id number]     Discharging Physician / Practitioner: MARK Andrade  PCP: Garcia Morales DO  Admission Date: 6/26/2018  Discharge Date: 06/29/18     Disposition:      Home     Reason for Admission: Progressive esophagitis      Discharge Diagnoses:      Principal Problem:    Dysphagia  Active Problems:    Myopathy    Lymphadenopathy    Knee effusion, right    Oral candidiasis  Resolved Problems:    * No resolved hospital problems  *        Consultations During Hospital Stay:  · Dr Dennis Mueller neurology  · Dr Monie Alvarez  · Dr Bud Ibarra oncology  · Dr Bhavana Tena orthopedics     Procedures Performed:      · CDF studies with negative comprehensive pcr  · CSF culture negative   · CSF total protein was 53  · ACE from CSF in process  · IGI synthesis of CSF fluid in process  · Lumbar puncture image: successful  · Right knee: No acute osseous abnormality  ·    Significant Findings / Test Results:      · See above      Incidental Findings:   · None      Test Results Pending at Discharge (will require follow up): · Above mentioned csf fluid results pending      Outpatient Tests Requested:  · Follow up with pcp in one week   · Follow up with his neurologist as scheduled      Complications:  none     Hospital Course:      Zetta Severin is a 61 y o  male patient who originally presented to the hospital on 6/26/2018 due to progressive dysphagia  Pt has an extensive history already determined and also still being worked up  Pt has a proximal myopathy under work up by his neuromuscular physician Dr Khalif Araya at [facility]   He had been send for a chest CT when there were findings of lymphadenopathy along with dysphagia for which he was admitted to the hospital   He was seen by gi on admission and underwent an egd which demonstrated a mild LA class B esophagitis  BIopsies were performed for eosinophilic esophagitis  He should follow up with  Gi as an outpt for mgmt of heartburn and further evaluation  He should continue to take PPI 20mg bid for the next 2 - 3 months  If he continue with this problem she should follow up with an outpt manometry for further evaluation  Pt was then seen by Dr Miranda Gist oncology who were following him for the enlarged lymph nodes, and a 30 pound weight loss over the last year  Orthopedics had seen him in regards to his history of polyarthralgias  Pt was noted to have an effusions with pain which was aspirated with no further surgical intervention per ortho  Neurology were consulted to see pt however upon review Dr Paula Baker noted that 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 Systemic illness vs paraneoplastic disease  Prior plan per pts physician was to have an LP performed  After a very long discussion between services and the patient and his wife that an LP would be performed with the necessary test being sent out for review  Pt was offered a trial of IVIG to help with his symptoms but eventually it was determined that the pt should undergo this treatment through Dr Valerie Boykin facility in order to manage all future treatments  Pt was in agreement to do this  Pt was also seen by our Rheumatologist (please read full report)   He agreed with Neurology work up  Recommending rl lyme western blot, anca testing C3, C4, total complement, myositis antibody panel which should include some neoplasm associated antibodies, repeat REBECCA, RF, quantitative immunoglobulins and check for IgG 4 disease   Also check QuantiFERON gold test for TB  He felt that biopsy may demonstrated a reactive process  He did favor a course of steroids starting at 40mg a day or higher if the lyme test was negative  He recognized that neuro wanted to start out with IVIG treatment  Pt should follow up with his current team to ensure completion and appropriate care since they know him well and are aware of the workup pt has undergone this fare  Pt was supposed to see his neuro doctor ilia guan for ongoing care  Please see full work up and review outstanding labs and csf studies still in process at this time     Condition at Discharge: fair      Discharge Day Visit / Exam:      Subjective:  Pt is doing ok  He still feels weak and understands and requests that he has final treatment through his neurology team  He has no pain at this time  Vitals: Blood Pressure: 134/72 (06/29/18 1148)  Pulse: 71 (06/29/18 1148)  Temperature: 98 6 °F (37 °C) (06/29/18 1148)  Temp Source: Oral (06/29/18 1148)  Respirations: 20 (06/29/18 1148)  Height: 5' 7" (170 2 cm) (06/26/18 1542)  Weight - Scale: 66 2 kg (146 lb) (06/26/18 1542)  SpO2: 99 % (06/29/18 1148)  Exam:   Physical Exam   Constitutional: He is oriented to person, place, and time  No distress  HENT:   Head: Normocephalic and atraumatic  Eyes: Right eye exhibits no discharge  Left eye exhibits no discharge  No scleral icterus  Neck: No JVD present  No tracheal deviation present  No thyromegaly present  Cardiovascular: Normal rate  Exam reveals no gallop and no friction rub  No murmur heard  Pulmonary/Chest: No stridor  No respiratory distress  He has no wheezes  He has no rales  He exhibits no tenderness  Abdominal: He exhibits no distension and no mass  There is no tenderness  There is no rebound and no guarding  Musculoskeletal: He exhibits no edema, tenderness or deformity  Severe lower extremity atophy noted    Lymphadenopathy:     He has no cervical adenopathy  Neurological: He is oriented to person, place, and time  Skin: No rash noted   He is not diaphoretic  No erythema  No pallor  Psychiatric: He has a normal mood and affect          Discussion with Family: today sister is at bedside /yesterday his wife and daughter at bedside      Discharge instructions/Information to patient and family:   See after visit summary for information provided to patient and family        Provisions for Follow-Up Care:  See after visit summary for information related to follow-up care and any pertinent home health orders        Planned Readmission: no plan      Discharge Statement:  I spent  60 minutes discharging the patient  This time was spent on the day of discharge  I had direct contact with the patient on the day of discharge  Greater than 50% of the total time was spent examining patient, answering all patient questions, arranging and discussing plan of care with patient as well as directly providing post-discharge instructions    Additional time then spent on discharge activities      Discharge Medications:  See after visit summary for reconciled discharge medications provided to patient and family        ** Please Note: This note has been constructed using a voice recognition system **                              Cosigned by: Sarath Flowers DO at 7/10/2018  1:27 PM   Revision History

## 2018-07-13 ENCOUNTER — OFFICE VISIT (OUTPATIENT)
Dept: FAMILY MEDICINE CLINIC | Facility: CLINIC | Age: 58
End: 2018-07-13
Payer: COMMERCIAL

## 2018-07-13 VITALS
SYSTOLIC BLOOD PRESSURE: 116 MMHG | RESPIRATION RATE: 18 BRPM | WEIGHT: 142.4 LBS | HEART RATE: 64 BPM | BODY MASS INDEX: 22.35 KG/M2 | HEIGHT: 67 IN | TEMPERATURE: 98.6 F | DIASTOLIC BLOOD PRESSURE: 74 MMHG

## 2018-07-13 DIAGNOSIS — M70.51 SUPRAPATELLAR BURSITIS OF RIGHT KNEE: ICD-10-CM

## 2018-07-13 DIAGNOSIS — R53.83 FATIGUE, UNSPECIFIED TYPE: ICD-10-CM

## 2018-07-13 DIAGNOSIS — M25.461 KNEE EFFUSION, RIGHT: ICD-10-CM

## 2018-07-13 DIAGNOSIS — G72.9 MYOPATHY: Primary | ICD-10-CM

## 2018-07-13 PROCEDURE — 99495 TRANSJ CARE MGMT MOD F2F 14D: CPT | Performed by: FAMILY MEDICINE

## 2018-07-13 RX ORDER — ACETAMINOPHEN 325 MG/1
650 TABLET ORAL ONCE
Status: COMPLETED | OUTPATIENT
Start: 2018-07-16 | End: 2018-07-16

## 2018-07-13 NOTE — PROGRESS NOTES
Assessment/Plan:    No problem-specific Assessment & Plan notes found for this encounter  Ongoing symptoms, available results reviewed, ESR 83 but everything seems nonspecific  Has onc/rheum f/u  IVIG planned, possible steroids next  Offer right knee bursitis aspiration next week if uncomfortable or can't wait until rheum visit  Lyme testing was negative    Offer meds for mood/pain, etc but defers for now  Daughter in room  Cont protonix, states he is swallowing better         Diagnoses and all orders for this visit:    Myopathy    Knee effusion, right    Fatigue, unspecified type      suspect right knee bursitis which has been already cultured/tested and neg for infection      14d ago  No Follow-up on file  Subjective:      Patient ID: Madison Soriano is a 61 y o  male  Chief Complaint   Patient presents with    Transition of Care Management     af/rma        HPI  7# loss in 3m from last visit  Right knee swelling  Swallowing better on protonix  Autoimmune? Saw Dr Sergo Scott Rheumatology  Has EGD also  Labs reviewed in detail  No myositis  Right knee no bacteria in past but high WBC  ivig pending starting next week    Date and time hospital follow up call was made:  7/2/2018  2:41 PM  Hospital care reviewed:  Records reviewed  Patient was hopsital[facility] at:  [facility]  Date of admission:  6/26/18  Date of discharge:  6/29/18  Diagnosis:  Dysphagia  Disposition:  Home  Were the patients medicaitons reviewed and updated:  Yes  Current symptoms:  (Comment: pt states has difficulty walking  due to be stiff , he stated has been going for years now, it is not new, but pt can still walk and get around  Pt  denied  any nausea, vomitting, dizzines, SOB, chest pain , palpatations  af/rma )  Post hospital issues:  None  Should patient be enrolled in anticoag monitoring?:  No  Scheduled for follow up?:  Yes  Patients specialists:  Neurologist  Neurologist's Name:  Dr Kirill Siddiqi  Did you obtain your prescribed medications:  Yes  Do you need help managing your perscriptions or medications:  No  I have advised the patient to call PCP with any new or worsening symptoms (please type in name along with any credentials):  Lola Morgan   Living Arrangements:  Spouse or Significiant other  Support System:  Spouse  Are you recieving outpatient services:  No  Are you recieving home care services:  No  Counseling:  Patient  Counseling topics:  instructions for management, patient and family education, Importance of RX compliance  Comments:  Spoke to pt on 7/2/18, stated doing better back to eating 3 times a day,  denies any trouble eating and or swalloing  Pt states has not made follow up appointments with Rheumatology,Hematology, and Gastro, due to waiting for lab work results  pt did schedule TCM with Dr Sima Red for 7/13/18  pt aware if expirencing any chest pain or SOB, to go to the ER  if any other symtopms arise can call office there is a provider avaliable 24/7 af/rma            The following portions of the patient's history were reviewed and updated as appropriate: allergies, current medications, past family history, past medical history, past social history, past surgical history and problem list     Review of Systems   Constitutional: Positive for chills  Negative for fever and unexpected weight change  Gastrointestinal: Negative for nausea and vomiting  Genitourinary: Negative for difficulty urinating  Current Outpatient Prescriptions   Medication Sig Dispense Refill    Acetaminophen (TYLENOL) 325 MG CAPS Take by mouth daily at bedtime        pantoprazole (PROTONIX) 40 mg tablet Take 1 tablet (40 mg total) by mouth 2 (two) times a day before meals 60 tablet 0     No current facility-administered medications for this visit        Facility-Administered Medications Ordered in Other Visits   Medication Dose Route Frequency Provider Last Rate Last Dose    [START ON 7/16/2018] acetaminophen (TYLENOL) tablet 650 mg  650 mg Oral Once [facility]        [START ON 7/16/2018] alteplase (CATHFLO) injection 2 mg  2 mg Intracatheter Once PRN [facility]        [START ON 7/16/2018] heparin lock flush 100 units/mL injection 300 Units  300 Units Intracatheter Q1H PRN [facility]        [START ON 7/16/2018] hydrocortisone sodium succinate (PF) (Solu-CORTEF) injection 100 mg  100 mg Intravenous Once [facility]        [START ON 7/16/2018] immune globulin, human (GAMUNEX-C) 35 g in IVPB 350 mL  35 g Intravenous Once [facility]           Objective:    /74   Pulse 64   Temp 98 6 °F (37 °C)   Resp 18   Ht 5' 7" (1 702 m)   Wt 64 6 kg (142 lb 6 4 oz)   BMI 22 30 kg/m²        Physical Exam   Constitutional: No distress  HENT:   Head: Normocephalic  Eyes: Conjunctivae are normal    Neck: Neck supple  Cardiovascular: Normal rate and intact distal pulses  Pulmonary/Chest: Effort normal  No respiratory distress  Abdominal: Soft  Musculoskeletal: He exhibits no edema or deformity  Neurological: He is alert  Skin: Skin is warm and dry  He is not diaphoretic  Psychiatric: His behavior is normal  Thought content normal    Nursing note and vitals reviewed      generally weak  Loss of muscle tone  No spasticity         Tilda Oni, DO

## 2018-07-15 RX ORDER — ACETAMINOPHEN 325 MG/1
650 TABLET ORAL ONCE
Status: COMPLETED | OUTPATIENT
Start: 2018-07-18 | End: 2018-07-18

## 2018-07-16 ENCOUNTER — HOSPITAL ENCOUNTER (OUTPATIENT)
Dept: INFUSION CENTER | Facility: HOSPITAL | Age: 58
Discharge: HOME | End: 2018-07-16
Payer: COMMERCIAL

## 2018-07-16 VITALS
SYSTOLIC BLOOD PRESSURE: 116 MMHG | HEART RATE: 84 BPM | WEIGHT: 145.06 LBS | BODY MASS INDEX: 23.31 KG/M2 | RESPIRATION RATE: 18 BRPM | OXYGEN SATURATION: 99 % | DIASTOLIC BLOOD PRESSURE: 75 MMHG | TEMPERATURE: 98.5 F | HEIGHT: 66 IN

## 2018-07-16 PROCEDURE — 96366 THER/PROPH/DIAG IV INF ADDON: CPT

## 2018-07-16 PROCEDURE — 96375 TX/PRO/DX INJ NEW DRUG ADDON: CPT

## 2018-07-16 PROCEDURE — 96365 THER/PROPH/DIAG IV INF INIT: CPT

## 2018-07-16 RX ADMIN — ACETAMINOPHEN 650 MG: 325 TABLET ORAL at 09:29

## 2018-07-16 RX ADMIN — Medication 35 G: at 09:39

## 2018-07-16 RX ADMIN — HYDROCORTISONE SODIUM SUCCINATE 100 MG: 100 INJECTION, POWDER, FOR SOLUTION INTRAMUSCULAR; INTRAVENOUS at 09:32

## 2018-07-17 NOTE — CASE MANAGEMENT
Continued Stay Review    Date: 18       Pending auth [id number]    Vital Signs:     Temp (24hrs), Av 4 °F (36 9 °C), Min:98 2 °F (36 8 °C), Max:98 7 °F (37 1 °C)     HR:  [70-82] 80  Resp:  [17-18] 18  BP: (106-128)/(55-80) 106/64  SpO2:  [97 %] 97 %  Body mass index is 22 87 kg/m²  Scheduled Meds:   Current Facility-Administered Medications:  acetaminophen 650 mg Oral Q6H PRN   enoxaparin 40 mg Subcutaneous Q24H BECKIE   lidocaine (PF) 10 mL Infiltration Once   magnesium hydroxide 30 mL Oral Daily PRN   nystatin 500,000 Units Swish & Swallow 4x Daily   ondansetron 4 mg Intravenous Q6H PRN         Age/Sex: 61 y o  male     Assessment/Plan:    Muscle atrophy   Assessment & Plan     · Starting approximately 2017 after a flu-like illness  Reported myalgia, weakness more in the proximal than the distal muscles with atrophy that is getting worse over the past few months, difficulty swallowing  He states that he took azithromycin and had an allergic reaction, consisting of a rash, and has had progressive proximal muscle weakness since then  After his evaluation he was noted to have lymphadenopathy and was referred to Dr Nick Jones  ? 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 He had EMG x 2 showed 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, 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   ?  MRI of the cervical spine showed multilevel degenerative changes no evidence of spinal canal stenosis or cord impingement  ? CT scan of the chest abdomen and pelvis on June 2018 showed left paratracheal lymph nodes measuring 1 3 x 0 8 cm, left internal mammary lymph nodes measuring 1 3 x 0 9 cm, AP window lymph node measuring 1 4 x 1 2 cm, no evidence of pericardial effusion, no evidence of masses in the liver or the spleen or the pancreas  Borderline enlarged lymph nodes at the portal region  ? Has received corticosteroids with improvement and Plaquenil without improvement  ? muscle biopsy inconclusive   · Follows with [facility] currently no diagnosis has been established   · He reports that his next evaluation will be a lumbar puncture with CSF studies with neurologist Dr Madhuri Cannon this Friday  Consider inpatient if not discharged before then  (however he states she wants him to get all dx studies at [address])  · Previously evaluated by Rheumatology Dr Fadumo Boucher? 6-8 mo ago who recommend neuro eval, patient and family requesting rheum eval           * Dysphagia   Assessment & Plan     · Esophageal dysphagia x 3 months of intermittent sx with solids and pills, no regurgitation, no issue with liquids   · Difficulty swallowing solids and has to drink liquids assist with swallowing     · Likely secondary to his unspecified myopathy versus Candida esophagitis  · Cleared by speech for reg diet, no oropharyngeal dysfunction, recommend reg diet with thins  · Continue nystatin swish and swallow  · Plan for EGD to r/o obstruction lesion, ring, EOE for tomorrow   · NPO at midnight           Lymphadenopathy   Assessment & Plan     · CT scan of the chest abdomen and pelvis on June 2018 showed left paratracheal lymph nodes measuring 1 3 x 0 8 cm, left internal mammary lymph nodes measuring 1 3 x 0 9 cm, AP window lymph node measuring 1 4 x 1 2 cm, no evidence of pericardial effusion, no evidence of masses in the liver or the spleen or the pancreas   Borderline enlarged lymph nodes at the portal region  · Discussed with heme/onc, consider EBUS pending results of EGD tomorrow  ? Believes Dr Lalita Lai has discs of images  If unable to access these images may need to repeat ct scan  · Appreciate oncology input           Knee effusion, right   Assessment & Plan     · New onset, nontraumatic, unknown etiology  Recently had right ankle swelling that resolved  · Appreciate ortho input, s/p joint aspiration 6/26, no crystals seen  · R knee XR negative           Oral candidiasis   Assessment & Plan     · Start nystatin swish swallow  · Gastroenterology input appreciated, EGD to evaluate for esophageal candidiasis                Certification Statement: The patient will continue to require additional inpatient hospital stay due to EGD tomorrow, possible biopsy of lymph nodes and lumbar puncture     Discharge Plan: TO BE DETERMINED

## 2018-07-17 NOTE — CASE MANAGEMENT
Continued Stay Review    Date: 17     Pending auth [id number]      Vital Signs:     Temp (24hrs), Av 3 °F (36 8 °C), Min:97 9 °F (36 6 °C), Max:98 6 °F (37 °C)     HR:  [71-90] 71  Resp:  [16-18] 18  BP: ()/(58-83) 112/71  SpO2:  [96 %-99 %] 99 %  Body mass index is 22 87 kg/m²    Medications:     Current Facility-Administered Medications:  acetaminophen 650 mg Oral Q6H PRN   [START ON 2018] enoxaparin 40 mg Subcutaneous Q24H BECKIE   lidocaine (PF) 10 mL Infiltration Once   magnesium hydroxide 30 mL Oral Daily PRN   nystatin 500,000 Units Swish & Swallow 4x Daily   ondansetron 4 mg Intravenous Q6H PRN   pantoprazole 40 mg Oral BID AC       Abnormal Labs/Diagnostic Results:    EGD  18     IMPRESSIONS: Mild esophagitis seen  Normal stomach  Normal, symmetrical,   and patent pylorus  Normal duodenum  RECOMMENDATIONS: Anti-reflux measures: Raise the head of the bed 4 to 6   inches  Avoid smoking  Avoid excess coffee, tea or other caffeinated   beverages  Avoid garments that fit tightly through the abdomen  Avoid   eating before bed  Resume regular diet as tolerated  Begin medication as   prescribed  Begin taking omeprazole (Prilosec) 20 mg PO twice a day    Age/Sex: 61 y o  male     Assessment/Plan:     INTERNAL MEDICINE  Lymphadenopathy   Assessment & Plan     · CT scan of the chest abdomen and pelvis on 2018 showed left paratracheal lymph nodes measuring 1 3 x 0 8 cm, left internal mammary lymph nodes measuring 1 3 x 0 9 cm, AP window lymph node measuring 1 4 x 1 2 cm, no evidence of pericardial effusion, no evidence of masses in the liver or the spleen or the pancreas  Borderline enlarged lymph nodes at the portal region  · Discussed with heme/onc, consider EBUS pending results of EGD tomorrow  ? Believes Dr Dru Salter has discs of images   If unable to access these images may need to repeat ct scan  · Appreciate oncology input           Myopathy   Assessment & Plan     · Starting approximately February of 2017 after a flu-like illness, tx with zpack with reported reaction including rash  · Reported myalgia, weakness in muscles (generalized)   with atrophy that is getting worse over the past few months, difficulty swallowing  After his evaluation he was noted to have lymphadenopathy and was referred to Dr Adan Higuera  ? 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 He had EMG x 2 showed 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, 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   ? Biopsy revealed 2 myofiber atrophy of moderately severe degree  ? - witnessed neuro exam with no real change from prior exam doc by neuro dr guan  ? MRI of the cervical spine showed multilevel degenerative changes no evidence of spinal canal stenosis or cord impingement  ? CT scan of the chest abdomen and pelvis on June 2018 showed left paratracheal lymph nodes measuring 1 3 x 0 8 cm, left internal mammary lymph nodes measuring 1 3 x 0 9 cm, AP window lymph node measuring 1 4 x 1 2 cm, no evidence of pericardial effusion, no evidence of masses in the liver or the spleen or the pancreas  Borderline enlarged lymph nodes at the portal region  ? Has received corticosteroids with improvement and Plaquenil without improvement  ? muscle biopsy inconclusive   ?  Tomorrow placed consult to ir (lovenox on hold) for LP see neuro note and Lymph node biopsy  · Follows with [facility] currently no diagnosis has been established   · He reports that his next evaluation will be a lumbar puncture with CSF studies with neurologist Dr Marleen Cat this Friday (which we will do tomorrow and then start IVIG tx as prev discussed with Dr Leandro Farias (neuromuscular specialist)  · Previously evaluated by Rheumatology Dr Mcdonnell? 6-8 mo ago   · To be seen by rheum later this evening           Oral candidiasis   Assessment & Plan     · Although previously on low dose steroids , question immunnosuppressive state  · nystatin swish swallow  · Gastroenterology input appreciated, EGD to evaluate for esophageal candidiasis  , however , although noted on tongue pt noted to have esophagitis   · - continue ppi           Knee effusion, right   Assessment & Plan     · New onset, nontraumatic, unknown etiology  Recently had right ankle swelling that resolved  · Appreciate ortho input, s/p joint aspiration 6/26, no crystals seen  · R knee XR negative           * Dysphagia   Assessment & Plan     · Esophageal dysphagia x 3 months of intermittent sx with solids and pills, no regurgitation, no issue with liquids   · Difficulty swallowing solids and has to drink liquids assist with swallowing     · Likely secondary to his unspecified myopathy versus Candida esophagitis  · Cleared by speech for reg diet, no oropharyngeal dysfunction, recommend reg diet with thins  · Continue nystatin swish and swallow  ·  EGD revealed some esophagitis continue bid ppi biopsy performed   ·           CONSULT NEUROLOGY      1  Myopathy  -Patient with muscle weakness/atrophy since 2/2017  States weakness has not gotten worse, but fluctuates with periods that are better than others  Unclear if this is primary vs secondary (i e  paraneoplastic, autoimmune) etiology  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  Exam remains relatively unchanged from previously documented exams   -Plan of care discussed with patient/family    They are agreeable to obtaining LP while admitted  We also discussed the possibility of administering a 5 day course of IVIG, patient/family currently discussing  -LP ordered, CSF/serum studies placed  -Continue to monitor and notify with changes     2  Dysphagia   -Progressive over past 3 months  Intermittent with symptoms occurring with solids and pills, no regurgitation, no issues with liquids  -EGD performed earlier today  Mild candida esophagitis   -Has been cleared by speech for regular diet  -Antifungals per primary team     3  Lymphadenopathy  -CT CAP in 6/2018 revleaed borderline lymphadenopathy of left paratracheal, left internal mammary, AP windo, and portal region on CT scan performed at [address] going to review with Radiology to decide on possibly biopsy  -Paraneoplastic CSF/serum studies ordered as above    CONSULT  RHEUMATOLOGY   Impression:     1  Chronic progressive proximal myopathy, noninflammatory with normal CPK and negative quad biopsy for necrosis/inflammatory cells/vasculitis, of unknown etiology  Idiopathic inflammatory myositis is excluded  Muscle biopsy from last month at [facility] showed type 2 muscle atrophy  No evidence for drug or statin use, chronic steroids although patient does like to drink vodka  Alcohol intake seem to be rather low and unlikely to be an etiologic factor  Lack of distal involvement and absence of inclusion body on muscle biopsy would be against inclusion body myopathy  Differential diagnosis this might be paraneoplastic, undiagnosed autoimmune disorder, degenerative type  myopathy, post viral disorder      2  Acute onset inflammatory arthritis right knee/ankle of 3 weeks duration with sed rate of 83  Right knee cell count was a class 2 inflammatory fluid with 10,000 white cells but no crystals  Rule out Lyme disease since the patient is from [place]    The patient continues to have generalized stiffness with some shoulder contractures but laboratory workup for connective tissue disease, SLE, RA have been unrewarding  PMR seems less likely at this time      3   Weight loss, night sweats, lymphadenopathy may point to malignancy, TB? Or part of a reactive process of a undiagnosed auto inflammatory disease      4  Dysphagia with evidence for esophagitis        Recommendation:     1  Obtain further workup including Lyme Western blot, Anca testing, C3, C4, total complement, myositis antibody panel which should include some neoplasm associated antibodies, repeat REBECCA, RF, quantitative immunoglobulins and check for IgG 4 disease  Also check QuantiFERON gold test for TB     2   Patient is due for spinal tap tomorrow      3  Await hematology opinion about value of lymph node biopsy although I wonder if it might just show at [facility] process       4  In view of his inflammatory oligo arthritis, stiff shoulders and elevated sed rate, might favor a course of steroids at 40 mg a day or higher if his Lyme test is negative    Understand that neurology may favor IVIG trial which is at been shown to help treatment resistant dermatomyositis, polymyositis as well as chronic immune demyelinating disorders etc     NUTRITION    Malnutrition Findings:   Malnutrition type: Chronic illness  Degree of Malnutrition: Malnutrition of moderate degree (12 8% wt loss x5 months; mildly depleted muscle mass/ muscle atrophy; treated with liberal diet and oral nutrition supplements)  Malnutrition Characteristics: Muscle loss, Weight loss      Body mass index is 22 87 kg/m²         Discharge Plan:  TO BE DETERMINED

## 2018-07-18 ENCOUNTER — HOSPITAL ENCOUNTER (OUTPATIENT)
Dept: INFUSION CENTER | Facility: HOSPITAL | Age: 58
Discharge: HOME | End: 2018-07-18
Payer: COMMERCIAL

## 2018-07-18 VITALS
TEMPERATURE: 99.2 F | HEART RATE: 90 BPM | HEIGHT: 66 IN | WEIGHT: 145.06 LBS | SYSTOLIC BLOOD PRESSURE: 135 MMHG | OXYGEN SATURATION: 96 % | RESPIRATION RATE: 18 BRPM | BODY MASS INDEX: 23.31 KG/M2 | DIASTOLIC BLOOD PRESSURE: 82 MMHG

## 2018-07-18 LAB — MISCELLANEOUS LAB TEST RESULT: NORMAL

## 2018-07-18 PROCEDURE — 96375 TX/PRO/DX INJ NEW DRUG ADDON: CPT

## 2018-07-18 PROCEDURE — 96365 THER/PROPH/DIAG IV INF INIT: CPT

## 2018-07-18 PROCEDURE — 96366 THER/PROPH/DIAG IV INF ADDON: CPT

## 2018-07-18 RX ORDER — ACETAMINOPHEN 325 MG/1
650 TABLET ORAL ONCE
Status: COMPLETED | OUTPATIENT
Start: 2018-07-20 | End: 2018-07-20

## 2018-07-18 RX ADMIN — ACETAMINOPHEN 650 MG: 325 TABLET ORAL at 09:16

## 2018-07-18 RX ADMIN — Medication 35 G: at 10:18

## 2018-07-18 RX ADMIN — HYDROCORTISONE SODIUM SUCCINATE 100 MG: 100 INJECTION, POWDER, FOR SOLUTION INTRAMUSCULAR; INTRAVENOUS at 09:16

## 2018-07-19 ENCOUNTER — TELEPHONE (OUTPATIENT)
Dept: HEMATOLOGY ONCOLOGY | Facility: CLINIC | Age: 58
End: 2018-07-19

## 2018-07-20 ENCOUNTER — HOSPITAL ENCOUNTER (OUTPATIENT)
Dept: INFUSION CENTER | Facility: HOSPITAL | Age: 58
Discharge: HOME | End: 2018-07-20
Payer: COMMERCIAL

## 2018-07-20 VITALS
OXYGEN SATURATION: 99 % | WEIGHT: 145.06 LBS | BODY MASS INDEX: 23.31 KG/M2 | SYSTOLIC BLOOD PRESSURE: 137 MMHG | DIASTOLIC BLOOD PRESSURE: 77 MMHG | RESPIRATION RATE: 18 BRPM | HEART RATE: 65 BPM | TEMPERATURE: 98.2 F

## 2018-07-20 PROCEDURE — 96365 THER/PROPH/DIAG IV INF INIT: CPT

## 2018-07-20 PROCEDURE — 96375 TX/PRO/DX INJ NEW DRUG ADDON: CPT

## 2018-07-20 PROCEDURE — 96366 THER/PROPH/DIAG IV INF ADDON: CPT

## 2018-07-20 RX ORDER — ACETAMINOPHEN 325 MG/1
650 TABLET ORAL ONCE
Status: COMPLETED | OUTPATIENT
Start: 2018-07-23 | End: 2018-07-23

## 2018-07-20 RX ADMIN — Medication 35 G: at 10:23

## 2018-07-20 RX ADMIN — HYDROCORTISONE SODIUM SUCCINATE 100 MG: 100 INJECTION, POWDER, FOR SOLUTION INTRAMUSCULAR; INTRAVENOUS at 09:16

## 2018-07-20 RX ADMIN — ACETAMINOPHEN 650 MG: 325 TABLET, FILM COATED ORAL at 09:16

## 2018-07-22 RX ORDER — ACETAMINOPHEN 325 MG/1
650 TABLET ORAL ONCE
Status: COMPLETED | OUTPATIENT
Start: 2018-07-25 | End: 2018-07-25

## 2018-07-23 ENCOUNTER — HOSPITAL ENCOUNTER (OUTPATIENT)
Dept: INFUSION CENTER | Facility: HOSPITAL | Age: 58
Discharge: HOME | End: 2018-07-23
Payer: COMMERCIAL

## 2018-07-23 VITALS
BODY MASS INDEX: 23.46 KG/M2 | SYSTOLIC BLOOD PRESSURE: 127 MMHG | DIASTOLIC BLOOD PRESSURE: 80 MMHG | WEIGHT: 145.94 LBS | OXYGEN SATURATION: 98 % | HEART RATE: 86 BPM | HEIGHT: 66 IN | RESPIRATION RATE: 18 BRPM | TEMPERATURE: 98.6 F

## 2018-07-23 PROCEDURE — 96375 TX/PRO/DX INJ NEW DRUG ADDON: CPT

## 2018-07-23 PROCEDURE — 96365 THER/PROPH/DIAG IV INF INIT: CPT

## 2018-07-23 PROCEDURE — 96366 THER/PROPH/DIAG IV INF ADDON: CPT

## 2018-07-23 RX ADMIN — HYDROCORTISONE SODIUM SUCCINATE 100 MG: 100 INJECTION, POWDER, FOR SOLUTION INTRAMUSCULAR; INTRAVENOUS at 09:06

## 2018-07-23 RX ADMIN — ACETAMINOPHEN 650 MG: 325 TABLET ORAL at 09:06

## 2018-07-23 RX ADMIN — Medication 35 G: at 09:41

## 2018-07-25 ENCOUNTER — HOSPITAL ENCOUNTER (OUTPATIENT)
Dept: INFUSION CENTER | Facility: HOSPITAL | Age: 58
Discharge: HOME | End: 2018-07-25
Payer: COMMERCIAL

## 2018-07-25 VITALS
WEIGHT: 147.49 LBS | BODY MASS INDEX: 23.7 KG/M2 | TEMPERATURE: 98.5 F | HEIGHT: 66 IN | DIASTOLIC BLOOD PRESSURE: 85 MMHG | SYSTOLIC BLOOD PRESSURE: 131 MMHG | OXYGEN SATURATION: 96 % | RESPIRATION RATE: 18 BRPM | HEART RATE: 89 BPM

## 2018-07-25 PROCEDURE — 96375 TX/PRO/DX INJ NEW DRUG ADDON: CPT

## 2018-07-25 PROCEDURE — 96365 THER/PROPH/DIAG IV INF INIT: CPT

## 2018-07-25 PROCEDURE — 96366 THER/PROPH/DIAG IV INF ADDON: CPT

## 2018-07-25 RX ADMIN — Medication 35 G: at 10:01

## 2018-07-25 RX ADMIN — ACETAMINOPHEN 650 MG: 325 TABLET, FILM COATED ORAL at 09:33

## 2018-07-25 RX ADMIN — HYDROCORTISONE SODIUM SUCCINATE 100 MG: 100 INJECTION, POWDER, FOR SOLUTION INTRAMUSCULAR; INTRAVENOUS at 09:33

## 2018-08-08 ENCOUNTER — TELEPHONE (OUTPATIENT)
Dept: GASTROENTEROLOGY | Facility: CLINIC | Age: 58
End: 2018-08-08

## 2018-08-08 ENCOUNTER — HOSPITAL ENCOUNTER (OUTPATIENT)
Dept: RADIOLOGY | Age: 58
Discharge: HOME | End: 2018-08-08
Payer: COMMERCIAL

## 2018-08-08 DIAGNOSIS — R59.1 LYMPHADENOPATHY: ICD-10-CM

## 2018-08-08 LAB — GLUCOSE SERPL-MCNC: 91 MG/DL (ref 65–140)

## 2018-08-08 PROCEDURE — A9552 F18 FDG: HCPCS

## 2018-08-08 PROCEDURE — 78815 PET IMAGE W/CT SKULL-THIGH: CPT

## 2018-08-08 PROCEDURE — 82948 REAGENT STRIP/BLOOD GLUCOSE: CPT

## 2018-08-08 RX ADMIN — IOHEXOL 5 ML: 240 INJECTION, SOLUTION INTRATHECAL; INTRAVASCULAR; INTRAVENOUS; ORAL at 07:16

## 2018-08-08 NOTE — TELEPHONE ENCOUNTER
Thad patient      [employer] called to give you information about getting a retroactive auth for visit on 6-28-18   You are to call [facility] wise at [phone]

## 2018-08-09 NOTE — TELEPHONE ENCOUNTER
I called and spoke to [employer] [facility] and she is aware they have to call the hospital and get the information from them because he was in-patient for his egd

## 2018-08-10 ENCOUNTER — TELEPHONE (OUTPATIENT)
Dept: HEMATOLOGY ONCOLOGY | Facility: CLINIC | Age: 58
End: 2018-08-10

## 2018-08-10 NOTE — TELEPHONE ENCOUNTER
Pt's wife [place] Done calling for results of PET scan  Results in EPIC and recommend robert bx  Did not inform Dandre Done of results   Please call

## 2018-08-21 ENCOUNTER — TELEPHONE (OUTPATIENT)
Dept: HEMATOLOGY ONCOLOGY | Facility: CLINIC | Age: 58
End: 2018-08-21

## 2018-08-21 ENCOUNTER — TRANSCRIBE ORDERS (OUTPATIENT)
Dept: ADMINISTRATIVE | Facility: HOSPITAL | Age: 58
End: 2018-08-21

## 2018-08-21 ENCOUNTER — HOSPITAL ENCOUNTER (OUTPATIENT)
Dept: RADIOLOGY | Facility: HOSPITAL | Age: 58
Discharge: HOME | End: 2018-08-21
Payer: COMMERCIAL

## 2018-08-21 DIAGNOSIS — M06.4 INFLAMMATORY POLYARTHROPATHY (HCC): ICD-10-CM

## 2018-08-21 DIAGNOSIS — M06.4 INFLAMMATORY POLYARTHROPATHY (HCC): Primary | ICD-10-CM

## 2018-08-21 PROCEDURE — 72200 X-RAY EXAM SI JOINTS: CPT

## 2018-08-21 PROCEDURE — 72040 X-RAY EXAM NECK SPINE 2-3 VW: CPT

## 2018-08-21 PROCEDURE — 72110 X-RAY EXAM L-2 SPINE 4/>VWS: CPT

## 2018-08-21 NOTE — TELEPHONE ENCOUNTER
Stated that the pt saw Dr Thad Maynard and he req that we order a bx for this patient  Benedicto Cuba is aware that the pt has an appt on 8 28 18 with Dr Shiela Marin but wanted to get the ball rolling with the bx beforehand    Req a call back to discuss

## 2018-08-22 NOTE — TELEPHONE ENCOUNTER
Spoke with Teodoro Gomez regarding recommendation of biopsy from Dr Carisa Monahan, will D/W Dr Consuelo Figueredo and get back to patient's wife

## 2018-08-28 ENCOUNTER — OFFICE VISIT (OUTPATIENT)
Dept: HEMATOLOGY ONCOLOGY | Facility: CLINIC | Age: 58
End: 2018-08-28
Payer: COMMERCIAL

## 2018-08-28 VITALS
DIASTOLIC BLOOD PRESSURE: 82 MMHG | TEMPERATURE: 97.3 F | BODY MASS INDEX: 22.76 KG/M2 | HEIGHT: 66 IN | WEIGHT: 141.6 LBS | SYSTOLIC BLOOD PRESSURE: 120 MMHG

## 2018-08-28 DIAGNOSIS — R59.1 LYMPHADENOPATHY: Primary | ICD-10-CM

## 2018-08-28 PROCEDURE — 99214 OFFICE O/P EST MOD 30 MIN: CPT | Performed by: INTERNAL MEDICINE

## 2018-08-28 NOTE — LETTER
August 28, 2018     Darren Wood, [address] [id number]    Patient: Shai Tesfaye   YOB: 1960   Date of Visit: 8/28/2018       Dear Dr Eleanor Francisco: Thank you for referring Shai Tesfaye to me for evaluation  Below are my notes for this consultation  If you have questions, please do not hesitate to call me  I look forward to following your patient along with you  Sincerely,        Elise Guerrero MD        CC: MD Rito Alarcon MD Gerold Jenny, MD  8/28/2018  3:20 PM  Sign at close encounter  Hematology Outpatient Follow - Up Note  Shai Tesfaye 61 y o  male MRN: @ Encounter: [id number]        Date:  8/28/2018        Assessment/ Plan:     1  Constellation of symptoms of inflammatory arthritis, the patient is followed by Rheumatology, he was tested negative for HLA B27, CRP is high, he got significant benefit on steroids, marginal benefit on of IVIG, he will follow up with Rheumatology  2  Bilateral internal iliac lymphadenopathy, by PET scan, the question rule out lymphoproliferative disorder, I will send the patient to IR for core biopsy of the lymph node to rule out low-grade lymphoma associated with inflammatory arthritis  3   The case was discussed with Dr Rosalva Hayward        HPI: 40-year-old  male who in february 2017 reported myalgia, weakness more in the proximal than the distal muscles with atrophy that is getting worse over the past few months, difficulty swallowing for liquid, he had an EMG indicative of chronic myopathy process however no active myositis, he was evaluated at [facility] in [place] by Rheumatology and Neurology, workup was negative for serum protein electrophoresis, immunofixation, rheumatoid factor however CRP was elevated, CPK was low, vitamin B12, TSH, T4, REBECCA, HIV, CMV, DNA antibodies, aldolase, Sjogren antibodies, Boland antibodies, ACR H receptor with negative  He sed rate was elevated at 31, Abby-Barr virus IgG positive however the IgM was negative  He had EMG on November 2017 showed 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  He was evaluated by neurology at [facility] scheduled to have LP  And MRI of the cervical spine showed multilevel degenerative changes no evidence of spinal canal stenosis or cord impingement  CT scan of the chest abdomen and pelvis on June 2018 showed left paratracheal lymph nodes measuring 1 3 x 0 8 cm, left internal mammary lymph nodes measuring 1 3 x 0 9 cm, AP window lymph node measuring 1 4 x 1 2 cm, no evidence of pericardial effusion, no evidence of masses in the liver or the spleen or the pancreas   Borderline enlarged lymph nodes at the portal region  Blood work on April 2018 showed no evidence of anemia, leukopenia or leukocytosis, normal differential, aldolase 3 9, CPK 29, C-reactive protein 41 5, PTT 32   He told me he received his in the past corticosteroids with improvement he received also Plaquenil without improvement  In the past 2 months the patient start having dysphagia to solids, 20 lb weight loss in 2 months, night sweats, fatigue, muscle pain, right knee pain with effusion  He could not turn his head, reported fatigue in the afternoon  Quit smoking 35 years ago, he drinks alcohol intermittently  Family history significant for sister with ovarian and breast cancer, the family does not know about BRCA1/ 2 mutation       Interval History:  Significant improvement on prednisone, his heaves IVIG as well, however when reducing prednisone history having the arthritis /arthralgias      ECOG score        Test Results:    Imaging: Xr Spine Cervical 2 Or 3 Vw Injury    Result Date: 8/22/2018  Narrative: CERVICAL SPINE INDICATION:   M06 4: Inflammatory polyarthropathy  COMPARISON:  None VIEWS:  XR SPINE CERVICAL 2 OR 3 VW INJURY Images: 4 FINDINGS: No evidence of fracture   Mild loss of the normal cervical lordosis  The bodies of C6 and C7 appear fused  Degenerative changes in the facet joints and uncovertebral joints at multiple levels  The prevertebral soft tissues are within normal limits  The lung apices are clear  Impression: Degenerative changes  Workstation performed: [id number]     Xr Spine Lumbar Minimum 4 Views Non Injury    Result Date: 8/22/2018  Narrative: LUMBAR SPINE INDICATION:   M06 4: Inflammatory polyarthropathy  COMPARISON:  None VIEWS:  XR SPINE LUMBAR MINIMUM 4 VIEWS NON INJURY Images: 4 FINDINGS: There is no evidence of acute fracture or destructive osseous lesion  Alignment is unremarkable  Mild spurring at multiple levels  Mild narrowing of the right SI joint compared to the left  The pedicles appear intact  Soft tissues are unremarkable  Impression: 1  Mild degenerative spurring in the lumbar spine  Narrowing of the right sacroiliac joint  Workstation performed: [id number]     Xr Sacroiliac Joints < 3 Views    Result Date: 8/22/2018  Narrative: SACROILIAC JOINTS INDICATION:   M06 4: Inflammatory polyarthropathy  COMPARISON:  None VIEWS:  XR SACROILIAC JOINTS < 3 VIEWS Images: 2 FINDINGS: Mild narrowing of the right SI joint compared to the left  Sacral arcuate lines appear intact  No fracture or pathologic bone lesions seen  Some demineralization on the right side of the sacrum  Mild arthritic changes in both hips  Impression: 1  Mild narrowing of the right sacroiliac joint compared to the left  In addition there is some demineralization seen on the right side of the sacrum  No definite osseous destruction  Workstation performed: [id number]     Nm Pet Ct Skull Base To Mid Thigh    Result Date: 8/8/2018  Narrative: PET/CT SCAN INDICATION: Abnormal CT, adenopathy, evaluate for neoplasm,R59 1: Generalized enlarged lymph nodes MODIFIER: PI COMPARISON: Outside CT 6/20/2018 CELL TYPE:  None TECHNIQUE:   8 1 mCi F-18-FDG administered IV   Multiplanar attenuation corrected and non attenuation corrected PET images are available for interpretation, and contiguous, low dose, axial CT sections were obtained from the skull base through the femurs   Oral contrast material (7 5 cc Omnipaque-240 in 300 cc water) was administered  Intravenous contrast material was not utilized  This examination, like all CT scans performed in the [facility], was performed utilizing techniques to minimize radiation dose exposure, including the use of iterative reconstruction and automated exposure control  Fasting serum glucose: 91 mg/dl FINDINGS: VISUALIZED BRAIN:   No acute abnormalities are seen  HEAD/NECK:   There is a physiologic distribution of FDG  No FDG avid cervical adenopathy is seen  CT images: Unremarkable  CHEST: There are several mildly hypermetabolic nodes in the thorax: Mildly hypermetabolic left internal mammary node image 84 series 3 measuring 1 2 x 0 7 cm, SUV 1 8  Right paratracheal node image 83 measures 1 3 x 1 1 cm, SUV 2 1  AP window node image 84 measures 1 9 x 1 2 cm, SUV 2 5  Superior mediastinal node image 72 measures 1 3 x 1 2 cm, SUV 2 3  There is a mildly hypermetabolic right axillary node, though uncertain if this is just related to the right upper extremity injection  This measures 1 5 x 0 7 cm, SUV 2 4  No hypermetabolic hilar or left axillary adenopathy  CT images: Otherwise unremarkable  ABDOMEN:   No FDG avid soft tissue lesions are seen  CT images: Colon diverticula  PELVIS: There are mildly hypermetabolic bilateral pelvic sidewall external iliac nodes  Example left external iliac node image 215 measures 1 6 x 0 6 cm, SUV 3 1  Left pelvic sidewall node image 219 measures 1 9 x 1 cm, SUV 3 5  Right external iliac node image 222 measures 1 7 x 0 8 cm, SUV 2 5  Adjacent right external iliac node image 223 measures 1 3 x 0 6 cm, SUV 1 7  CT images: Otherwise stable   OSSEOUS STRUCTURES: There is a small nonspecific hypermetabolic focus in the left anterior acetabular region, SUV 4 5  Bilateral shoulder activity may be degenerative  No additional FDG avid lesions are seen  CT images: No significant findings  Impression: 1  Nonspecific mildly hypermetabolic thoracic and pelvic nodes  Etiology such as a low-grade lymphoproliferative disorder are not excluded  Consider tissue sampling for diagnosis  2   Nonspecific hypermetabolic focus in the left anterior acetabular region  This may be correlated with robert biopsy results  Workstation performed: [id number]       Labs:   Lab Results   Component Value Date    WBC 11 03 (H) 06/26/2018    HGB 13 0 06/26/2018    HCT 41 5 06/26/2018    MCV 93 06/26/2018     (H) 06/26/2018     Lab Results   Component Value Date     06/26/2018    K 4 2 06/26/2018     06/26/2018    CO2 31 06/26/2018    BUN 15 06/26/2018    CREATININE 0 68 06/26/2018    GLUCOSE 108 (H) 05/01/2017    CALCIUM 10 0 06/26/2018    AST 21 06/26/2018    ALT 30 06/26/2018    ALKPHOS 113 06/26/2018    PROT 6 8 05/01/2017    BILITOT 0 4 05/01/2017    EGFR 105 06/26/2018       No results found for: IRON, TIBC, FERRITIN    No results found for: HROLVILD85      ROS:   Review of Systems   Constitutional: Negative for activity change, appetite change, chills, diaphoresis, fatigue, fever and unexpected weight change  HENT: Negative for congestion, dental problem, facial swelling, hearing loss, mouth sores, nosebleeds, postnasal drip, rhinorrhea, sore throat, trouble swallowing and voice change  Eyes: Negative for photophobia, pain, discharge, redness, itching and visual disturbance  Respiratory: Negative for cough, choking, chest tightness, shortness of breath and wheezing  Cardiovascular: Negative for chest pain, palpitations and leg swelling  Gastrointestinal: Negative for abdominal distention, abdominal pain, anal bleeding, blood in stool, constipation, diarrhea, nausea, rectal pain and vomiting     Endocrine: Negative for cold intolerance and heat intolerance  Genitourinary: Negative for decreased urine volume, difficulty urinating, dysuria, flank pain, frequency, hematuria and urgency  Musculoskeletal: Positive for arthralgias, back pain and myalgias  Negative for gait problem, joint swelling, neck pain and neck stiffness  Skin: Negative for color change, pallor, rash and wound  Allergic/Immunologic: Negative for immunocompromised state  Neurological: Negative for dizziness, tremors, seizures, syncope, facial asymmetry, speech difficulty, weakness, light-headedness, numbness and headaches  Hematological: Negative for adenopathy  Does not bruise/bleed easily  Psychiatric/Behavioral: Negative for agitation, confusion, decreased concentration, dysphoric mood and sleep disturbance  The patient is not nervous/anxious  All other systems reviewed and are negative  Current Medications: Reviewed  Allergies: Reviewed  PMH/FH/SH:  Reviewed      Physical Exam:    Body surface area is 1 73 meters squared  Wt Readings from Last 3 Encounters:   08/28/18 64 2 kg (141 lb 9 6 oz)   07/25/18 66 9 kg (147 lb 7 8 oz)   07/23/18 66 2 kg (145 lb 15 1 oz)        Temp Readings from Last 3 Encounters:   08/28/18 (!) 97 3 °F (36 3 °C) (Tympanic)   07/25/18 98 5 °F (36 9 °C) (Temporal)   07/23/18 98 6 °F (37 °C) (Temporal)        BP Readings from Last 3 Encounters:   08/28/18 120/82   07/25/18 131/85   07/23/18 127/80         Pulse Readings from Last 3 Encounters:   07/25/18 89   07/23/18 86   07/20/18 65        Physical Exam   Constitutional: He is oriented to person, place, and time  He appears well-developed and well-nourished  No distress  HENT:   Head: Normocephalic and atraumatic  Mouth/Throat: Oropharynx is clear and moist  No oropharyngeal exudate  Eyes: Conjunctivae and EOM are normal  Pupils are equal, round, and reactive to light  Neck: Normal range of motion  Neck supple  No tracheal deviation present  No thyromegaly present  Cardiovascular: Normal rate and regular rhythm  Exam reveals no gallop and no friction rub  No murmur heard  Pulmonary/Chest: Effort normal and breath sounds normal  No respiratory distress  He has no wheezes  He has no rales  He exhibits no tenderness  Abdominal: Soft  Bowel sounds are normal  He exhibits no distension and no mass  There is no tenderness  There is no rebound and no guarding  Musculoskeletal: Normal range of motion  He exhibits tenderness  He exhibits no edema  Lymphadenopathy:     He has no cervical adenopathy  Neurological: He is alert and oriented to person, place, and time  Skin: Skin is warm and dry  No rash noted  He is not diaphoretic  No erythema  No pallor  Psychiatric: He has a normal mood and affect  His behavior is normal  Judgment and thought content normal    Vitals reviewed  Goals and Barriers:  Current Goal: Minimize effects of disease  Barriers: None  Patient's Capacity to Self Care:  Patient is able to self care      Code Status: [unfilled]

## 2018-08-28 NOTE — PROGRESS NOTES
Hematology Outpatient Follow - Up Note  Madisonnancy Soriano 61 y o  male MRN: @ Encounter: [id number]        Date:  8/28/2018        Assessment/ Plan:     1  Constellation of symptoms of inflammatory arthritis, the patient is followed by Rheumatology, he was tested negative for HLA B27, CRP is high, he got significant benefit on steroids, marginal benefit on of IVIG, he will follow up with Rheumatology  2  Bilateral internal iliac lymphadenopathy, by PET scan, the question rule out lymphoproliferative disorder, I will send the patient to IR for core biopsy of the lymph node to rule out low-grade lymphoma associated with inflammatory arthritis  3   The case was discussed with Dr Kevin Haile        HPI: 53-year-old  male who in february 2017 reported myalgia, weakness more in the proximal than the distal muscles with atrophy that is getting worse over the past few months, difficulty swallowing for liquid, he had an EMG indicative of chronic myopathy process however no active myositis, he was evaluated at [facility] in [place] by Rheumatology and Neurology, workup was negative for serum protein electrophoresis, immunofixation, rheumatoid factor however CRP was elevated, CPK was low, vitamin B12, TSH, T4, REBECCA, HIV, CMV, DNA antibodies, aldolase, Sjogren antibodies, Boland antibodies, ACR H receptor with negative  He sed rate was elevated at 31, Abby-Barr virus IgG positive however the IgM was negative  He had EMG on November 2017 showed 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  He was evaluated by neurology at [facility] scheduled to have LP  And MRI of the cervical spine showed multilevel degenerative changes no evidence of spinal canal stenosis or cord impingement  CT scan of the chest abdomen and pelvis on June 2018 showed left paratracheal lymph nodes measuring 1 3 x 0 8 cm, left internal mammary lymph nodes measuring 1 3 x 0 9 cm, AP window lymph node measuring 1 4 x 1 2 cm, no evidence of pericardial effusion, no evidence of masses in the liver or the spleen or the pancreas   Borderline enlarged lymph nodes at the portal region  Blood work on April 2018 showed no evidence of anemia, leukopenia or leukocytosis, normal differential, aldolase 3 9, CPK 29, C-reactive protein 41 5, PTT 32   He told me he received his in the past corticosteroids with improvement he received also Plaquenil without improvement  In the past 2 months the patient start having dysphagia to solids, 20 lb weight loss in 2 months, night sweats, fatigue, muscle pain, right knee pain with effusion  He could not turn his head, reported fatigue in the afternoon  Quit smoking 35 years ago, he drinks alcohol intermittently  Family history significant for sister with ovarian and breast cancer, the family does not know about BRCA1/ 2 mutation      Interval History:  Significant improvement on prednisone, his heaves IVIG as well, however when reducing prednisone history having the arthritis /arthralgias      ECOG score        Test Results:    Imaging: Xr Spine Cervical 2 Or 3 Vw Injury    Result Date: 8/22/2018  Narrative: CERVICAL SPINE INDICATION:   M06 4: Inflammatory polyarthropathy  COMPARISON:  None VIEWS:  XR SPINE CERVICAL 2 OR 3 VW INJURY Images: 4 FINDINGS: No evidence of fracture  Mild loss of the normal cervical lordosis  The bodies of C6 and C7 appear fused  Degenerative changes in the facet joints and uncovertebral joints at multiple levels  The prevertebral soft tissues are within normal limits  The lung apices are clear  Impression: Degenerative changes  Workstation performed: [id number]     Xr Spine Lumbar Minimum 4 Views Non Injury    Result Date: 8/22/2018  Narrative: LUMBAR SPINE INDICATION:   M06 4: Inflammatory polyarthropathy   COMPARISON:  None VIEWS:  XR SPINE LUMBAR MINIMUM 4 VIEWS NON INJURY Images: 4 FINDINGS: There is no evidence of acute fracture or destructive osseous lesion  Alignment is unremarkable  Mild spurring at multiple levels  Mild narrowing of the right SI joint compared to the left  The pedicles appear intact  Soft tissues are unremarkable  Impression: 1  Mild degenerative spurring in the lumbar spine  Narrowing of the right sacroiliac joint  Workstation performed: [id number]     Xr Sacroiliac Joints < 3 Views    Result Date: 8/22/2018  Narrative: SACROILIAC JOINTS INDICATION:   M06 4: Inflammatory polyarthropathy  COMPARISON:  None VIEWS:  XR SACROILIAC JOINTS < 3 VIEWS Images: 2 FINDINGS: Mild narrowing of the right SI joint compared to the left  Sacral arcuate lines appear intact  No fracture or pathologic bone lesions seen  Some demineralization on the right side of the sacrum  Mild arthritic changes in both hips  Impression: 1  Mild narrowing of the right sacroiliac joint compared to the left  In addition there is some demineralization seen on the right side of the sacrum  No definite osseous destruction  Workstation performed: [id number]     Nm Pet Ct Skull Base To Mid Thigh    Result Date: 8/8/2018  Narrative: PET/CT SCAN INDICATION: Abnormal CT, adenopathy, evaluate for neoplasm,R59 1: Generalized enlarged lymph nodes MODIFIER: PI COMPARISON: Outside CT 6/20/2018 CELL TYPE:  None TECHNIQUE:   8 1 mCi F-18-FDG administered IV  Multiplanar attenuation corrected and non attenuation corrected PET images are available for interpretation, and contiguous, low dose, axial CT sections were obtained from the skull base through the femurs   Oral contrast material (7 5 cc Omnipaque-240 in 300 cc water) was administered  Intravenous contrast material was not utilized  This examination, like all CT scans performed in the [facility], was performed utilizing techniques to minimize radiation dose exposure, including the use of iterative reconstruction and automated exposure control   Fasting serum glucose: 91 mg/dl FINDINGS: VISUALIZED BRAIN:   No acute abnormalities are seen  HEAD/NECK:   There is a physiologic distribution of FDG  No FDG avid cervical adenopathy is seen  CT images: Unremarkable  CHEST: There are several mildly hypermetabolic nodes in the thorax: Mildly hypermetabolic left internal mammary node image 84 series 3 measuring 1 2 x 0 7 cm, SUV 1 8  Right paratracheal node image 83 measures 1 3 x 1 1 cm, SUV 2 1  AP window node image 84 measures 1 9 x 1 2 cm, SUV 2 5  Superior mediastinal node image 72 measures 1 3 x 1 2 cm, SUV 2 3  There is a mildly hypermetabolic right axillary node, though uncertain if this is just related to the right upper extremity injection  This measures 1 5 x 0 7 cm, SUV 2 4  No hypermetabolic hilar or left axillary adenopathy  CT images: Otherwise unremarkable  ABDOMEN:   No FDG avid soft tissue lesions are seen  CT images: Colon diverticula  PELVIS: There are mildly hypermetabolic bilateral pelvic sidewall external iliac nodes  Example left external iliac node image 215 measures 1 6 x 0 6 cm, SUV 3 1  Left pelvic sidewall node image 219 measures 1 9 x 1 cm, SUV 3 5  Right external iliac node image 222 measures 1 7 x 0 8 cm, SUV 2 5  Adjacent right external iliac node image 223 measures 1 3 x 0 6 cm, SUV 1 7  CT images: Otherwise stable  OSSEOUS STRUCTURES: There is a small nonspecific hypermetabolic focus in the left anterior acetabular region, SUV 4 5  Bilateral shoulder activity may be degenerative  No additional FDG avid lesions are seen  CT images: No significant findings  Impression: 1  Nonspecific mildly hypermetabolic thoracic and pelvic nodes  Etiology such as a low-grade lymphoproliferative disorder are not excluded  Consider tissue sampling for diagnosis  2   Nonspecific hypermetabolic focus in the left anterior acetabular region  This may be correlated with robert biopsy results   Workstation performed: [id number]       Labs:   Lab Results   Component Value Date    WBC 11 03 (H) 06/26/2018    HGB 13 0 06/26/2018    HCT 41 5 06/26/2018    MCV 93 06/26/2018     (H) 06/26/2018     Lab Results   Component Value Date     06/26/2018    K 4 2 06/26/2018     06/26/2018    CO2 31 06/26/2018    BUN 15 06/26/2018    CREATININE 0 68 06/26/2018    GLUCOSE 108 (H) 05/01/2017    CALCIUM 10 0 06/26/2018    AST 21 06/26/2018    ALT 30 06/26/2018    ALKPHOS 113 06/26/2018    PROT 6 8 05/01/2017    BILITOT 0 4 05/01/2017    EGFR 105 06/26/2018       No results found for: IRON, TIBC, FERRITIN    No results found for: [id number]      ROS:   Review of Systems   Constitutional: Negative for activity change, appetite change, chills, diaphoresis, fatigue, fever and unexpected weight change  HENT: Negative for congestion, dental problem, facial swelling, hearing loss, mouth sores, nosebleeds, postnasal drip, rhinorrhea, sore throat, trouble swallowing and voice change  Eyes: Negative for photophobia, pain, discharge, redness, itching and visual disturbance  Respiratory: Negative for cough, choking, chest tightness, shortness of breath and wheezing  Cardiovascular: Negative for chest pain, palpitations and leg swelling  Gastrointestinal: Negative for abdominal distention, abdominal pain, anal bleeding, blood in stool, constipation, diarrhea, nausea, rectal pain and vomiting  Endocrine: Negative for cold intolerance and heat intolerance  Genitourinary: Negative for decreased urine volume, difficulty urinating, dysuria, flank pain, frequency, hematuria and urgency  Musculoskeletal: Positive for arthralgias, back pain and myalgias  Negative for gait problem, joint swelling, neck pain and neck stiffness  Skin: Negative for color change, pallor, rash and wound  Allergic/Immunologic: Negative for immunocompromised state  Neurological: Negative for dizziness, tremors, seizures, syncope, facial asymmetry, speech difficulty, weakness, light-headedness, numbness and headaches  Hematological: Negative for adenopathy  Does not bruise/bleed easily  Psychiatric/Behavioral: Negative for agitation, confusion, decreased concentration, dysphoric mood and sleep disturbance  The patient is not nervous/anxious  All other systems reviewed and are negative  Current Medications: Reviewed  Allergies: Reviewed  PMH/FH/SH:  Reviewed      Physical Exam:    Body surface area is 1 73 meters squared  Wt Readings from Last 3 Encounters:   08/28/18 64 2 kg (141 lb 9 6 oz)   07/25/18 66 9 kg (147 lb 7 8 oz)   07/23/18 66 2 kg (145 lb 15 1 oz)        Temp Readings from Last 3 Encounters:   08/28/18 (!) 97 3 °F (36 3 °C) (Tympanic)   07/25/18 98 5 °F (36 9 °C) (Temporal)   07/23/18 98 6 °F (37 °C) (Temporal)        BP Readings from Last 3 Encounters:   08/28/18 120/82   07/25/18 131/85   07/23/18 127/80         Pulse Readings from Last 3 Encounters:   07/25/18 89   07/23/18 86   07/20/18 65        Physical Exam   Constitutional: He is oriented to person, place, and time  He appears well-developed and well-nourished  No distress  HENT:   Head: Normocephalic and atraumatic  Mouth/Throat: Oropharynx is clear and moist  No oropharyngeal exudate  Eyes: Conjunctivae and EOM are normal  Pupils are equal, round, and reactive to light  Neck: Normal range of motion  Neck supple  No tracheal deviation present  No thyromegaly present  Cardiovascular: Normal rate and regular rhythm  Exam reveals no gallop and no friction rub  No murmur heard  Pulmonary/Chest: Effort normal and breath sounds normal  No respiratory distress  He has no wheezes  He has no rales  He exhibits no tenderness  Abdominal: Soft  Bowel sounds are normal  He exhibits no distension and no mass  There is no tenderness  There is no rebound and no guarding  Musculoskeletal: Normal range of motion  He exhibits tenderness  He exhibits no edema  Lymphadenopathy:     He has no cervical adenopathy     Neurological: He is alert and oriented to person, place, and time  Skin: Skin is warm and dry  No rash noted  He is not diaphoretic  No erythema  No pallor  Psychiatric: He has a normal mood and affect  His behavior is normal  Judgment and thought content normal    Vitals reviewed  Goals and Barriers:  Current Goal: Minimize effects of disease  Barriers: None  Patient's Capacity to Self Care:  Patient is able to self care      Code Status: @[facility]@

## 2018-08-31 ENCOUNTER — TELEPHONE (OUTPATIENT)
Dept: HEMATOLOGY ONCOLOGY | Facility: CLINIC | Age: 58
End: 2018-08-31

## 2018-08-31 NOTE — TELEPHONE ENCOUNTER
Patient's wife Jaxson Maradiaga called regarding biopsy  Dr Carol Enamorado spoke with Dr Danny Glynn, lymph nodes are not accessible for biopsy  He suggests repeating CT scan in 3 months  Patient's wife informed  Will have  set up CT scan and f/u

## 2018-09-04 DIAGNOSIS — R59.1 LYMPHADENOPATHY: Primary | ICD-10-CM

## 2018-09-04 DIAGNOSIS — R59.9 ADENOPATHY: Primary | ICD-10-CM

## 2018-10-19 ENCOUNTER — PATIENT OUTREACH (OUTPATIENT)
Dept: FAMILY MEDICINE CLINIC | Facility: CLINIC | Age: 58
End: 2018-10-19

## 2018-10-25 ENCOUNTER — OFFICE VISIT (OUTPATIENT)
Dept: FAMILY MEDICINE CLINIC | Facility: CLINIC | Age: 58
End: 2018-10-25
Payer: COMMERCIAL

## 2018-10-25 VITALS
RESPIRATION RATE: 16 BRPM | HEART RATE: 88 BPM | TEMPERATURE: 98 F | HEIGHT: 66 IN | SYSTOLIC BLOOD PRESSURE: 120 MMHG | DIASTOLIC BLOOD PRESSURE: 80 MMHG | BODY MASS INDEX: 24.43 KG/M2 | WEIGHT: 152 LBS

## 2018-10-25 DIAGNOSIS — J06.9 ACUTE URI: Primary | ICD-10-CM

## 2018-10-25 DIAGNOSIS — H10.33 ACUTE CONJUNCTIVITIS OF BOTH EYES, UNSPECIFIED ACUTE CONJUNCTIVITIS TYPE: ICD-10-CM

## 2018-10-25 DIAGNOSIS — Z23 NEEDS FLU SHOT: ICD-10-CM

## 2018-10-25 PROCEDURE — 90682 RIV4 VACC RECOMBINANT DNA IM: CPT

## 2018-10-25 PROCEDURE — 99213 OFFICE O/P EST LOW 20 MIN: CPT | Performed by: NURSE PRACTITIONER

## 2018-10-25 PROCEDURE — 90471 IMMUNIZATION ADMIN: CPT

## 2018-10-25 RX ORDER — TOBRAMYCIN AND DEXAMETHASONE 3; 1 MG/ML; MG/ML
1 SUSPENSION/ DROPS OPHTHALMIC
Qty: 10 ML | Refills: 0 | Status: SHIPPED | OUTPATIENT
Start: 2018-10-25 | End: 2018-12-05 | Stop reason: HOSPADM

## 2018-10-25 RX ORDER — PREDNISONE 1 MG/1
7.5 TABLET ORAL DAILY
COMMUNITY
Start: 2018-10-18 | End: 2018-12-05 | Stop reason: HOSPADM

## 2018-10-25 RX ORDER — AMOXICILLIN 875 MG/1
875 TABLET, COATED ORAL 2 TIMES DAILY
Qty: 14 TABLET | Refills: 0 | Status: SHIPPED | OUTPATIENT
Start: 2018-10-25 | End: 2018-11-01

## 2018-10-25 RX ORDER — PREDNISONE 10 MG/1
5 TABLET ORAL DAILY
COMMUNITY
Start: 2018-09-06 | End: 2021-09-24

## 2018-10-25 RX ORDER — FOLIC ACID 1 MG/1
1 TABLET ORAL DAILY
COMMUNITY
Start: 2018-10-18

## 2018-10-25 NOTE — PATIENT INSTRUCTIONS
Take medication with food  It is important that you take the entire course of antibiotics prescribed  May also take a probiotic of your choice to maintain healthy GI amalia  Can take some probiotic and yogurt with the medication  Increase fluid intake, saline nasal rinses, and hot tea with honey and lemon  Cool air humidification can be helpful as well  May take Ibuprofen or Tylenol as needed for pain or fevers  Mucinex D for sinus congestion or Coricidin HBP if you have high blood pressure or a heart condition  Mucinex or Robitussin DM are effective for cough and chest congestion  Supportive care discussed and advised  Follow up for no improvement and worsening of conditions  Patient advised and educated when to see immediate medical care  Upper Respiratory Infection   AMBULATORY CARE:   An upper respiratory infection  is also called a common cold  It can affect your nose, throat, ears, and sinuses  Common signs and symptoms include the following:  Cold symptoms are usually worst for the first 3 to 5 days  You may have any of the following:  · Runny or stuffy nose    · Sneezing and coughing    · Sore throat or hoarseness    · Red, watery, and sore eyes    · Fatigue     · Chills and fever    · Headache, body aches, or sore muscles  Seek care immediately if:   · You have chest pain or trouble breathing  Contact your healthcare provider if:   · You have a fever over 102ºF (39°C)  · Your sore throat gets worse or you see white or yellow spots in your throat  · Your symptoms get worse after 3 to 5 days or your cold is not better in 14 days  · You have a rash anywhere on your skin  · You have large, tender lumps in your neck  · You have thick, green or yellow drainage from your nose  · You cough up thick yellow, green, or bloody mucus  · You have vomiting for more than 24 hours and cannot keep fluids down  · You have a bad earache      · You have questions or concerns about your condition or care  Treatment for a cold: There is no cure for the common cold  Colds are caused by viruses and do not get better with antibiotics  Most people get better in 7 to 14 days  You may continue to cough for 2 to 3 weeks  The following may help decrease your symptoms:  · Decongestants  help reduce nasal congestion and help you breathe more easily  If you take decongestant pills, they may make you feel restless or not able to sleep  Do not use decongestant sprays for more than a few days  · Cough suppressants  help reduce coughing  Ask your healthcare provider which type of cough medicine is best for you  · NSAIDs , such as ibuprofen, help decrease swelling, pain, and fever  NSAIDs can cause stomach bleeding or kidney problems in certain people  If you take blood thinner medicine, always ask your healthcare provider if NSAIDs are safe for you  Always read the medicine label and follow directions  · Acetaminophen  decreases pain and fever  It is available without a doctor's order  Ask how much to take and how often to take it  Follow directions  Read the labels of all other medicines you are using to see if they also contain acetaminophen, or ask your doctor or pharmacist  Acetaminophen can cause liver damage if not taken correctly  Do not use more than 4 grams (4,000 milligrams) total of acetaminophen in one day  Manage your cold:   · Rest as much as possible  Slowly start to do more each day  · Drink more liquids as directed  Liquids will help thin and loosen mucus so you can cough it up  Liquids will also help prevent dehydration  Liquids that help prevent dehydration include water, fruit juice, and broth  Do not drink liquids that contain caffeine  Caffeine can increase your risk for dehydration  Ask your healthcare provider how much liquid to drink each day  · Soothe a sore throat  Gargle with warm salt water  This helps your sore throat feel better   Make salt water by dissolving ¼ teaspoon salt in 1 cup warm water  You may also suck on hard candy or throat lozenges  You may use a sore throat spray  · Use a humidifier or vaporizer  Use a cool mist humidifier or a vaporizer to increase air moisture in your home  This may make it easier for you to breathe and help decrease your cough  · Use saline nasal drops as directed  These help relieve congestion  · Apply petroleum-based jelly around the outside of your nostrils  This can decrease irritation from blowing your nose  · Do not smoke  Nicotine and other chemicals in cigarettes and cigars can make your symptoms worse  They can also cause infections such as bronchitis or pneumonia  Ask your healthcare provider for information if you currently smoke and need help to quit  E-cigarettes or smokeless tobacco still contain nicotine  Talk to your healthcare provider before you use these products  Prevent spreading your cold to others:   · Try to stay away from other people during the first 2 to 3 days of your cold when it is more easily spread  · Do not share food or drinks  · Do not share hand towels with household members  · Wash your hands often, especially after you blow your nose  Turn away from other people and cover your mouth and nose with a tissue when you sneeze or cough  Follow up with your healthcare provider as directed:  Write down your questions so you remember to ask them during your visits  © 2017 [employer] [employer] Information is for End User's use only and may not be sold, redistributed or otherwise used for commercial purposes  All illustrations and images included in CareNotes® are the copyrighted property of [employer]  or [employer]ega  The above information is an  only  It is not intended as medical advice for individual conditions or treatments   Talk to your doctor, nurse or pharmacist before following any medical regimen to see if it is safe and effective for you

## 2018-10-25 NOTE — PROGRESS NOTES
Subjective:      Patient ID: Nelly Harvey is a 60 y o  male  Chief Complaint   Patient presents with    Cold Like Symptoms     for 10 days now    poss  pink eye       HPI  Patient stated that having cough and congestion from about week and half ago  Using OTC cough medication  Denies fever, chills, sob and chest pain  Also having redness with yellowish discharge from both eyes from couple of days  Denies any vision changes  The following portions of the patient's history were reviewed and updated as appropriate: allergies, current medications, past family history, past medical history, past social history, past surgical history and problem list       Review of Systems   Constitutional: Negative for chills, fatigue and fever  HENT: Positive for congestion  Negative for ear discharge, ear pain, facial swelling, hearing loss, mouth sores, nosebleeds, postnasal drip, rhinorrhea, sinus pain, sinus pressure, sneezing, sore throat, trouble swallowing and voice change  Eyes: Positive for discharge and redness  Negative for pain, itching and visual disturbance  Respiratory: Positive for cough  Negative for chest tightness, shortness of breath and wheezing  Cardiovascular: Negative  Gastrointestinal: Negative for abdominal pain, constipation, diarrhea and nausea  Neurological: Negative for dizziness, weakness, light-headedness and headaches  Objective:    History   Smoking Status    Former Smoker    Packs/day: 1 00    Years: 15 00    Types: Cigarettes    Quit date: 2/26/1986   Smokeless Tobacco    Never Used       Allergies:    Allergies   Allergen Reactions    Azithromycin Rash and Hives       Vitals:  /80   Pulse 88   Temp 98 °F (36 7 °C)   Resp 16   Ht 5' 6" (1 676 m)   Wt 68 9 kg (152 lb)   BMI 24 53 kg/m²     Current Outpatient Prescriptions   Medication Sig Dispense Refill    Acetaminophen (TYLENOL) 325 MG CAPS Take by mouth daily at bedtime        folic acid (FOLVITE) 1 mg tablet Take 1 mg by mouth daily      methotrexate 2 5 mg tablet Take 2 5 mg by mouth 4 (four) times a week      predniSONE 10 mg tablet Take 10 mg by mouth daily      predniSONE 5 mg tablet Take 7 5 mg by mouth daily      amoxicillin (AMOXIL) 875 mg tablet Take 1 tablet (875 mg total) by mouth 2 (two) times a day for 7 days 14 tablet 0    tobramycin-dexamethasone (TOBRADEX) ophthalmic suspension Administer 1 drop to both eyes every 4 (four) hours while awake 10 mL 0     No current facility-administered medications for this visit  Physical Exam   Constitutional: He is oriented to person, place, and time  He appears well-developed and well-nourished  HENT:   Head: Normocephalic  Right Ear: Tympanic membrane, external ear and ear canal normal    Left Ear: Tympanic membrane, external ear and ear canal normal    Nose: Nose normal  Right sinus exhibits no maxillary sinus tenderness and no frontal sinus tenderness  Left sinus exhibits no maxillary sinus tenderness and no frontal sinus tenderness  Mouth/Throat: Oropharynx is clear and moist and mucous membranes are normal    Eyes: Lids are normal  Right conjunctiva is injected  Left conjunctiva is injected  Neck: Neck supple  Cardiovascular: Normal rate, regular rhythm and normal heart sounds  Pulmonary/Chest: Effort normal and breath sounds normal    Abdominal: Normal appearance and bowel sounds are normal  There is no hepatosplenomegaly  There is no tenderness  There is no rebound  Musculoskeletal: Normal range of motion  Lymphadenopathy:        Right cervical: No superficial cervical and no posterior cervical adenopathy present  Left cervical: No superficial cervical and no posterior cervical adenopathy present  Neurological: He is alert and oriented to person, place, and time  Skin: Skin is warm and dry  Psychiatric: He has a normal mood and affect   His behavior is normal  Judgment and thought content normal  Vitals reviewed  Assessment/Plan:  Take medication with food  It is important that you take the entire course of antibiotics prescribed  May also take a probiotic of your choice to maintain healthy GI amalia  Can take some probiotic and yogurt with the medication  Increase fluid intake, saline nasal rinses, and hot tea with honey and lemon  Cool air humidification can be helpful as well  May take Ibuprofen or Tylenol as needed for pain or fevers  Mucinex D for sinus congestion or Coricidin HBP if you have high blood pressure or a heart condition  Mucinex or Robitussin DM are effective for cough and chest congestion  Supportive care discussed and advised  Follow up for no improvement and worsening of conditions  Patient advised and educated when to see immediate medical care  Diagnoses and all orders for this visit:    Acute URI  -     amoxicillin (AMOXIL) 875 mg tablet; Take 1 tablet (875 mg total) by mouth 2 (two) times a day for 7 days    Acute conjunctivitis of both eyes, unspecified acute conjunctivitis type  -     tobramycin-dexamethasone (TOBRADEX) ophthalmic suspension; Administer 1 drop to both eyes every 4 (four) hours while awake    Needs flu shot  -     influenza vaccine, 1769-0107, quadrivalent, recombinant, PF, 0 5 mL, for patients 18 yr+ (FLUBLOK)    BMI 24 0-24 9, adult    Other orders  -     methotrexate 2 5 mg tablet; Take 2 5 mg by mouth 4 (four) times a week  -     predniSONE 5 mg tablet; Take 7 5 mg by mouth daily  -     folic acid (FOLVITE) 1 mg tablet; Take 1 mg by mouth daily  -     predniSONE 10 mg tablet; Take 10 mg by mouth daily          Return if symptoms worsen or fail to improve        MARK White

## 2018-11-01 ENCOUNTER — TRANSCRIBE ORDERS (OUTPATIENT)
Dept: ADMINISTRATIVE | Facility: HOSPITAL | Age: 58
End: 2018-11-01

## 2018-11-01 ENCOUNTER — HOSPITAL ENCOUNTER (OUTPATIENT)
Dept: RADIOLOGY | Facility: HOSPITAL | Age: 58
Discharge: HOME | End: 2018-11-01
Payer: COMMERCIAL

## 2018-11-01 DIAGNOSIS — R59.1 LYMPHADENOPATHY: Primary | ICD-10-CM

## 2018-11-01 DIAGNOSIS — R59.9 ADENOPATHY: ICD-10-CM

## 2018-11-01 PROCEDURE — 71260 CT THORAX DX C+: CPT

## 2018-11-01 PROCEDURE — 74177 CT ABD & PELVIS W/CONTRAST: CPT

## 2018-11-01 RX ADMIN — IOHEXOL 100 ML: 350 INJECTION, SOLUTION INTRAVENOUS at 08:50

## 2018-11-24 ENCOUNTER — OFFICE VISIT (OUTPATIENT)
Dept: FAMILY MEDICINE CLINIC | Facility: CLINIC | Age: 58
End: 2018-11-24
Payer: COMMERCIAL

## 2018-11-24 VITALS
TEMPERATURE: 97 F | HEART RATE: 68 BPM | RESPIRATION RATE: 16 BRPM | DIASTOLIC BLOOD PRESSURE: 80 MMHG | WEIGHT: 152 LBS | HEIGHT: 66 IN | SYSTOLIC BLOOD PRESSURE: 124 MMHG | BODY MASS INDEX: 24.43 KG/M2

## 2018-11-24 DIAGNOSIS — J40 BRONCHITIS: Primary | ICD-10-CM

## 2018-11-24 PROCEDURE — 3008F BODY MASS INDEX DOCD: CPT | Performed by: FAMILY MEDICINE

## 2018-11-24 PROCEDURE — 1036F TOBACCO NON-USER: CPT | Performed by: FAMILY MEDICINE

## 2018-11-24 PROCEDURE — 99213 OFFICE O/P EST LOW 20 MIN: CPT | Performed by: FAMILY MEDICINE

## 2018-11-24 RX ORDER — CEFPODOXIME PROXETIL 200 MG/1
200 TABLET, FILM COATED ORAL 2 TIMES DAILY
Qty: 14 TABLET | Refills: 0 | Status: SHIPPED | OUTPATIENT
Start: 2018-11-24 | End: 2018-12-01

## 2018-11-24 RX ORDER — BENZONATATE 200 MG/1
200 CAPSULE ORAL 3 TIMES DAILY PRN
Qty: 20 CAPSULE | Refills: 0 | Status: SHIPPED | OUTPATIENT
Start: 2018-11-24 | End: 2018-12-05 | Stop reason: HOSPADM

## 2018-11-24 NOTE — PATIENT INSTRUCTIONS
Robitussin 10cc  3x daily rest      F u Chest if worsens   wean prednisone   f/u if worsens or pesruists

## 2018-11-24 NOTE — PROGRESS NOTES
Subjective:           Problem List Items Addressed This Visit     Bronchitis - Primary    Relevant Medications    cefpodoxime (VANTIN) 200 mg tablet    benzonatate (TESSALON) 200 MG capsule              No orders of the defined types were placed in this encounter  Patient Instructions   Robitussin 10cc  3x daily rest      F u Chest if worsens   wean prednisone   f/u if worsens or pesruists      [place]      HPI URI cold  Cough yellow phlegm symptoms past few days followed by Heme/ Rheumatology  On MTX pred for seroneg RA/ Myalgias    Vitals:    11/24/18 1123   BP: 124/80   Pulse: 68   Resp: 16   Temp: (!) 97 °F (36 1 °C)       Allergies   Allergen Reactions    Azithromycin Rash and Hives       Current Outpatient Prescriptions on File Prior to Visit   Medication Sig Dispense Refill    Acetaminophen (TYLENOL) 325 MG CAPS Take by mouth daily at bedtime        folic acid (FOLVITE) 1 mg tablet Take 1 mg by mouth daily      methotrexate 2 5 mg tablet Take 2 5 mg by mouth once a week 4 pills once weekly       predniSONE 10 mg tablet Take 5 mg by mouth daily        predniSONE 5 mg tablet Take 7 5 mg by mouth daily      tobramycin-dexamethasone (TOBRADEX) ophthalmic suspension Administer 1 drop to both eyes every 4 (four) hours while awake (Patient not taking: Reported on 11/24/2018 ) 10 mL 0     No current facility-administered medications on file prior to visit  Past Medical History:   Diagnosis Date    Leg pain     bilateral leg pain (chronic)    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    Neoplasm of skin     lasr assessed 5/14/13, 5/25/15    Suprapatellar bursitis of right knee        Past Surgical History:   Procedure Laterality Date    ESOPHAGOGASTRODUODENOSCOPY N/A 6/28/2018    Procedure: ESOPHAGOGASTRODUODENOSCOPY (EGD); Surgeon: Alexandria Mendiola MD;  Location: [facility];   Service: Gastroenterology    ROTATOR CUFF REPAIR      ROTATOR CUFF REPAIR Left 3/1/2018    Procedure: SHOULDER ARTHROSCOPY WITH ROTATOR CUFF REPAIR, SUBACROMIAL DECOMPRESSION, LIMITED SYNOVECTOMY;  Surgeon: Merari Villalobos MD;  Location: [address] [place] [address];  Service: Orthopedics    SHOULDER ARTHROSCOPY      2015    TONSILLECTOMY            reports that he quit smoking about 32 years ago  His smoking use included Cigarettes  He has a 15 00 pack-year smoking history  He has never used smokeless tobacco  He reports that he drinks about 0 6 oz of alcohol per week   He reports that he does not use drugs  reports that he quit smoking about 32 years ago  His smoking use included Cigarettes  He has a 15 00 pack-year smoking history  He has never used smokeless tobacco     The following portions of the patient's history were reviewed and updated as appropriate: allergies, current medications, past family history, past medical history, past social history, past surgical history and problem list     Review of Systems   Constitutional: Negative for appetite change, chills and fever  HENT: Positive for congestion, postnasal drip and sinus pressure  Respiratory: Negative for choking, chest tightness, shortness of breath, wheezing and stridor  Cardiovascular: Negative for chest pain  Genitourinary: Negative for hematuria  Musculoskeletal: Negative for joint swelling  Psychiatric/Behavioral: Negative for agitation  Physical Exam   Constitutional: He is oriented to person, place, and time  HENT:   Head: Normocephalic  Mouth/Throat: Oropharynx is clear and moist    Mucoid turbinates   Eyes: Pupils are equal, round, and reactive to light  Right eye exhibits no discharge  No scleral icterus  Neck: No thyromegaly present  Pulmonary/Chest: No stridor  Rhonchi Bilateral R>L  No wheeze   Abdominal: Soft  Bowel sounds are normal  There is no tenderness  There is no guarding  Musculoskeletal: Normal range of motion  Neurological: He is alert and oriented to person, place, and time  He exhibits normal muscle tone     Skin: Skin is warm and dry  Capillary refill takes less than 2 seconds

## 2018-12-05 ENCOUNTER — OFFICE VISIT (OUTPATIENT)
Dept: HEMATOLOGY ONCOLOGY | Facility: CLINIC | Age: 58
End: 2018-12-05
Payer: COMMERCIAL

## 2018-12-05 VITALS
HEIGHT: 67 IN | RESPIRATION RATE: 14 BRPM | HEART RATE: 84 BPM | TEMPERATURE: 97.3 F | BODY MASS INDEX: 23.81 KG/M2 | SYSTOLIC BLOOD PRESSURE: 120 MMHG | DIASTOLIC BLOOD PRESSURE: 80 MMHG

## 2018-12-05 DIAGNOSIS — R59.0 INGUINAL LYMPHADENOPATHY: ICD-10-CM

## 2018-12-05 DIAGNOSIS — R91.8 LUNG NODULES: Primary | ICD-10-CM

## 2018-12-05 PROCEDURE — 99214 OFFICE O/P EST MOD 30 MIN: CPT | Performed by: INTERNAL MEDICINE

## 2018-12-05 NOTE — PROGRESS NOTES
Hematology Outpatient Follow - Up Note  Lalito Alexander 60 y o  male MRN: @ Encounter: [id number]        Date:  12/5/2018        Assessment/ Plan:     constellation of autoimmune arthritis, the patient was diagnosed with rheumatoid arthritis, seronegative, he would benefit greatly on corticosteroids, currently on prednisone 5 mg p o  Daily, methotrexate 10 mg p o   Every 3 stay, his followed by Rheumatology     Right middle lobe pulmonary nodule, the patient quit smoking 35 years ago, will repeat CT scan of the chest in 1 year     Inguinal /iliac lymphadenopathy bilaterally most likely reactive, I will repeat CT scan of the abdomen and pelvis in 1 year with CBC, CMP and LDH to rule out lymphoma in the background of autoimmune arthritis            HPI:  48-year-old  male who in february 2017 reported myalgia, weakness more in the proximal than the distal muscles with atrophy that is getting worse over the past few months, difficulty swallowing for liquid, he had an EMG indicative of chronic myopathy process however no active myositis, he was evaluated at [facility] in [place] by Rheumatology and Neurology, workup was negative for serum protein electrophoresis, immunofixation, rheumatoid factor however CRP was elevated, CPK was low, vitamin B12, TSH, T4, REBECCA, HIV, CMV, DNA antibodies, aldolase, Sjogren antibodies, Boland antibodies, ACR H receptor with negative  He sed rate was elevated at 31, Abby-Barr virus IgG positive however the IgM was negative  He had EMG on November 2017 showed 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  He was evaluated by neurology at [facility] scheduled to have LP  And MRI of the cervical spine showed multilevel degenerative changes no evidence of spinal canal stenosis or cord impingement  CT scan of the chest abdomen and pelvis on June 2018 showed left paratracheal lymph nodes measuring 1 3 x 0 8 cm, left internal mammary lymph nodes measuring 1 3 x 0 9 cm, AP window lymph node measuring 1 4 x 1 2 cm, no evidence of pericardial effusion, no evidence of masses in the liver or the spleen or the pancreas   Borderline enlarged lymph nodes at the portal region  Blood work on April 2018 showed no evidence of anemia, leukopenia or leukocytosis, normal differential, aldolase 3 9, CPK 29, C-reactive protein 41 5, PTT 32   He told me he received his in the past corticosteroids with improvement he received also Plaquenil without improvement  In the past 2 months the patient start having dysphagia to solids, 20 lb weight loss in 2 months, night sweats, fatigue, muscle pain, right knee pain with effusion    LP was negative for Lyme disease, MS     He was evaluated by Rheumatology, he was diagnosed with seronegative rheumatoid arthritis, he responded very well to steroids, currently on prednisone 5 mg p o  Daily with methotrexate 10 mg p o  Every Thursday    Regarding the left paratracheal lymph nodes as well as left inguinal lymph nodes, he had PET scan and after he was initiated on prednisone the lymph node that is smaller, biopsy could not be done    The most recent CT scan of the chest abdomen and pelvis showed small right middle lobe pulmonary nodule measuring 8 mm right internal iliac lymph nodes, left iliac lymph nodes, no new lymph nodes    Interval History:   He feels much better on prednisone, he is back to full-time work      Previous Treatment:         Test Results:    Imaging: No results found      Labs:   Lab Results   Component Value Date    WBC 11 03 (H) 06/26/2018    HGB 13 0 06/26/2018    HCT 41 5 06/26/2018    MCV 93 06/26/2018     (H) 06/26/2018     Lab Results   Component Value Date     05/01/2017    K 4 2 06/26/2018     06/26/2018    CO2 31 06/26/2018    BUN 15 06/26/2018    CREATININE 0 68 06/26/2018    GLUCOSE 108 (H) 05/01/2017    CALCIUM 10 0 06/26/2018    AST 21 06/26/2018    ALT 30 06/26/2018    ALKPHOS 113 06/26/2018    PROT 6 8 05/01/2017    BILITOT 0 4 05/01/2017    EGFR 105 06/26/2018       No results found for: IRON, TIBC, FERRITIN    No results found for: [id number]      ROS:   Review of Systems   Constitutional: Negative for activity change, appetite change, chills, diaphoresis, fatigue, fever and unexpected weight change  HENT: Negative for congestion, dental problem, facial swelling, hearing loss, mouth sores, nosebleeds, postnasal drip, rhinorrhea, sore throat, trouble swallowing and voice change  Eyes: Negative for photophobia, pain, discharge, redness, itching and visual disturbance  Respiratory: Negative for cough, choking, chest tightness, shortness of breath and wheezing  Cardiovascular: Negative for chest pain, palpitations and leg swelling  Gastrointestinal: Negative for abdominal distention, abdominal pain, anal bleeding, blood in stool, constipation, diarrhea, nausea, rectal pain and vomiting  Endocrine: Negative for cold intolerance and heat intolerance  Genitourinary: Negative for decreased urine volume, difficulty urinating, dysuria, flank pain, frequency, hematuria and urgency  Musculoskeletal: Negative for arthralgias, back pain, gait problem, joint swelling, myalgias, neck pain and neck stiffness  Skin: Negative for color change, pallor, rash and wound  Allergic/Immunologic: Negative for immunocompromised state  Neurological: Negative for dizziness, tremors, seizures, syncope, facial asymmetry, speech difficulty, weakness, light-headedness, numbness and headaches  Hematological: Negative for adenopathy  Does not bruise/bleed easily  Psychiatric/Behavioral: Negative for agitation, confusion, decreased concentration, dysphoric mood and sleep disturbance  The patient is not nervous/anxious  All other systems reviewed and are negative          Current Medications: Reviewed  Allergies: Reviewed  PMH/FH/SH:  Reviewed      Physical Exam:    Body surface area is 1 8 meters squared  Wt Readings from Last 3 Encounters:   11/24/18 68 9 kg (152 lb)   10/25/18 68 9 kg (152 lb)   08/28/18 64 2 kg (141 lb 9 6 oz)        Temp Readings from Last 3 Encounters:   12/05/18 (!) 97 3 °F (36 3 °C) (Tympanic)   11/24/18 (!) 97 °F (36 1 °C)   10/25/18 98 °F (36 7 °C)        BP Readings from Last 3 Encounters:   12/05/18 120/80   11/24/18 124/80   10/25/18 120/80         Pulse Readings from Last 3 Encounters:   12/05/18 84   11/24/18 68   10/25/18 88        Physical Exam   Constitutional: He is oriented to person, place, and time  He appears well-developed and well-nourished  No distress  HENT:   Head: Normocephalic and atraumatic  Mouth/Throat: Oropharynx is clear and moist  No oropharyngeal exudate  Eyes: Pupils are equal, round, and reactive to light  Conjunctivae and EOM are normal  No scleral icterus  Neck: Normal range of motion  Neck supple  No JVD present  No tracheal deviation present  No thyromegaly present  Cardiovascular: Normal rate and regular rhythm  Exam reveals no gallop and no friction rub  No murmur heard  Pulmonary/Chest: Effort normal and breath sounds normal  No respiratory distress  He has no wheezes  He has no rales  He exhibits no tenderness  Abdominal: Soft  Bowel sounds are normal  He exhibits no distension and no mass  There is no tenderness  There is no rebound and no guarding  Musculoskeletal: Normal range of motion  He exhibits no edema  Lymphadenopathy:     He has no cervical adenopathy  Neurological: He is alert and oriented to person, place, and time  Skin: Skin is warm and dry  No rash noted  He is not diaphoretic  No erythema  No pallor  Psychiatric: He has a normal mood and affect  His behavior is normal  Judgment and thought content normal    Vitals reviewed  Goals and Barriers:  Current Goal: Minimize effects of disease  Barriers: None        Patient's Capacity to Self Care:  Patient is able to self care      Code Status: [unfilled]

## 2018-12-05 NOTE — LETTER
December 5, 2018     Luz Lundy, [id number] [phone]    Patient: Medardo Wen   YOB: 1960   Date of Visit: 12/5/2018       Dear Dr De La Fuente Public: Thank you for referring Medardo Wen to me for evaluation  Below are my notes for this consultation  If you have questions, please do not hesitate to call me  I look forward to following your patient along with you  Sincerely,        Mark Schafer MD        CC: MD Mark Fontaine MD  12/5/2018  3:31 PM  Sign at close encounter  Hematology Outpatient Follow - Up Note  Medardo Wen 60 y o  male MRN: @ Encounter: [id number]        Date:  12/5/2018        Assessment/ Plan:     constellation of autoimmune arthritis, the patient was diagnosed with rheumatoid arthritis, seronegative, he would benefit greatly on corticosteroids, currently on prednisone 5 mg p o  Daily, methotrexate 10 mg p o   Every 3 stay, his followed by Rheumatology     Right middle lobe pulmonary nodule, the patient quit smoking 35 years ago, will repeat CT scan of the chest in 1 year     Inguinal /iliac lymphadenopathy bilaterally most likely reactive, I will repeat CT scan of the abdomen and pelvis in 1 year with CBC, CMP and LDH to rule out lymphoma in the background of autoimmune arthritis            HPI:  34-year-old  male who in february 2017 reported myalgia, weakness more in the proximal than the distal muscles with atrophy that is getting worse over the past few months, difficulty swallowing for liquid, he had an EMG indicative of chronic myopathy process however no active myositis, he was evaluated at [facility] in [place] by Rheumatology and Neurology, workup was negative for serum protein electrophoresis, immunofixation, rheumatoid factor however CRP was elevated, CPK was low, vitamin B12, TSH, T4, REBECCA, HIV, CMV, DNA antibodies, aldolase, Sjogren antibodies, Boland antibodies, ACR H receptor with negative  He sed rate was elevated at 31, Abby-Barr virus IgG positive however the IgM was negative  He had EMG on November 2017 showed 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  He was evaluated by neurology at [facility] scheduled to have LP  And MRI of the cervical spine showed multilevel degenerative changes no evidence of spinal canal stenosis or cord impingement  CT scan of the chest abdomen and pelvis on June 2018 showed left paratracheal lymph nodes measuring 1 3 x 0 8 cm, left internal mammary lymph nodes measuring 1 3 x 0 9 cm, AP window lymph node measuring 1 4 x 1 2 cm, no evidence of pericardial effusion, no evidence of masses in the liver or the spleen or the pancreas   Borderline enlarged lymph nodes at the portal region  Blood work on April 2018 showed no evidence of anemia, leukopenia or leukocytosis, normal differential, aldolase 3 9, CPK 29, C-reactive protein 41 5, PTT 32   He told me he received his in the past corticosteroids with improvement he received also Plaquenil without improvement  In the past 2 months the patient start having dysphagia to solids, 20 lb weight loss in 2 months, night sweats, fatigue, muscle pain, right knee pain with effusion    LP was negative for Lyme disease, MS     He was evaluated by Rheumatology, he was diagnosed with seronegative rheumatoid arthritis, he responded very well to steroids, currently on prednisone 5 mg p o  Daily with methotrexate 10 mg p o   Every Thursday    Regarding the left paratracheal lymph nodes as well as left inguinal lymph nodes, he had PET scan and after he was initiated on prednisone the lymph node that is smaller, biopsy could not be done    The most recent CT scan of the chest abdomen and pelvis showed small right middle lobe pulmonary nodule measuring 8 mm right internal iliac lymph nodes, left iliac lymph nodes, no new lymph nodes    Interval History:   He feels much better on prednisone, he is back to full-time work      Previous Treatment:         Test Results:    Imaging: No results found  Labs:   Lab Results   Component Value Date    WBC 11 03 (H) 06/26/2018    HGB 13 0 06/26/2018    HCT 41 5 06/26/2018    MCV 93 06/26/2018     (H) 06/26/2018     Lab Results   Component Value Date     05/01/2017    K 4 2 06/26/2018     06/26/2018    CO2 31 06/26/2018    BUN 15 06/26/2018    CREATININE 0 68 06/26/2018    GLUCOSE 108 (H) 05/01/2017    CALCIUM 10 0 06/26/2018    AST 21 06/26/2018    ALT 30 06/26/2018    ALKPHOS 113 06/26/2018    PROT 6 8 05/01/2017    BILITOT 0 4 05/01/2017    EGFR 105 06/26/2018       No results found for: IRON, TIBC, FERRITIN    No results found for: [id number]      ROS:   Review of Systems   Constitutional: Negative for activity change, appetite change, chills, diaphoresis, fatigue, fever and unexpected weight change  HENT: Negative for congestion, dental problem, facial swelling, hearing loss, mouth sores, nosebleeds, postnasal drip, rhinorrhea, sore throat, trouble swallowing and voice change  Eyes: Negative for photophobia, pain, discharge, redness, itching and visual disturbance  Respiratory: Negative for cough, choking, chest tightness, shortness of breath and wheezing  Cardiovascular: Negative for chest pain, palpitations and leg swelling  Gastrointestinal: Negative for abdominal distention, abdominal pain, anal bleeding, blood in stool, constipation, diarrhea, nausea, rectal pain and vomiting  Endocrine: Negative for cold intolerance and heat intolerance  Genitourinary: Negative for decreased urine volume, difficulty urinating, dysuria, flank pain, frequency, hematuria and urgency  Musculoskeletal: Negative for arthralgias, back pain, gait problem, joint swelling, myalgias, neck pain and neck stiffness  Skin: Negative for color change, pallor, rash and wound     Allergic/Immunologic: Negative for immunocompromised state  Neurological: Negative for dizziness, tremors, seizures, syncope, facial asymmetry, speech difficulty, weakness, light-headedness, numbness and headaches  Hematological: Negative for adenopathy  Does not bruise/bleed easily  Psychiatric/Behavioral: Negative for agitation, confusion, decreased concentration, dysphoric mood and sleep disturbance  The patient is not nervous/anxious  All other systems reviewed and are negative  Current Medications: Reviewed  Allergies: Reviewed  PMH/FH/SH:  Reviewed      Physical Exam:    Body surface area is 1 8 meters squared  Wt Readings from Last 3 Encounters:   11/24/18 68 9 kg (152 lb)   10/25/18 68 9 kg (152 lb)   08/28/18 64 2 kg (141 lb 9 6 oz)        Temp Readings from Last 3 Encounters:   12/05/18 (!) 97 3 °F (36 3 °C) (Tympanic)   11/24/18 (!) 97 °F (36 1 °C)   10/25/18 98 °F (36 7 °C)        BP Readings from Last 3 Encounters:   12/05/18 120/80   11/24/18 124/80   10/25/18 120/80         Pulse Readings from Last 3 Encounters:   12/05/18 84   11/24/18 68   10/25/18 88        Physical Exam   Constitutional: He is oriented to person, place, and time  He appears well-developed and well-nourished  No distress  HENT:   Head: Normocephalic and atraumatic  Mouth/Throat: Oropharynx is clear and moist  No oropharyngeal exudate  Eyes: Pupils are equal, round, and reactive to light  Conjunctivae and EOM are normal  No scleral icterus  Neck: Normal range of motion  Neck supple  No JVD present  No tracheal deviation present  No thyromegaly present  Cardiovascular: Normal rate and regular rhythm  Exam reveals no gallop and no friction rub  No murmur heard  Pulmonary/Chest: Effort normal and breath sounds normal  No respiratory distress  He has no wheezes  He has no rales  He exhibits no tenderness  Abdominal: Soft  Bowel sounds are normal  He exhibits no distension and no mass  There is no tenderness   There is no rebound and no guarding  Musculoskeletal: Normal range of motion  He exhibits no edema  Lymphadenopathy:     He has no cervical adenopathy  Neurological: He is alert and oriented to person, place, and time  Skin: Skin is warm and dry  No rash noted  He is not diaphoretic  No erythema  No pallor  Psychiatric: He has a normal mood and affect  His behavior is normal  Judgment and thought content normal    Vitals reviewed  Goals and Barriers:  Current Goal: Minimize effects of disease  Barriers: None  Patient's Capacity to Self Care:  Patient is able to self care      Code Status: @[facility]@

## 2019-01-14 LAB — SCAN RESULT: NORMAL

## 2019-12-05 ENCOUNTER — HOSPITAL ENCOUNTER (OUTPATIENT)
Dept: RADIOLOGY | Facility: HOSPITAL | Age: 59
Discharge: HOME | End: 2019-12-05
Payer: COMMERCIAL

## 2019-12-05 ENCOUNTER — TRANSCRIBE ORDERS (OUTPATIENT)
Dept: ADMINISTRATIVE | Facility: HOSPITAL | Age: 59
End: 2019-12-05

## 2019-12-05 DIAGNOSIS — R59.0 INGUINAL LYMPHADENOPATHY: ICD-10-CM

## 2019-12-05 DIAGNOSIS — R91.8 LUNG NODULES: ICD-10-CM

## 2019-12-05 PROCEDURE — 74177 CT ABD & PELVIS W/CONTRAST: CPT

## 2019-12-05 PROCEDURE — 71260 CT THORAX DX C+: CPT

## 2019-12-05 RX ADMIN — IOHEXOL 100 ML: 350 INJECTION, SOLUTION INTRAVENOUS at 08:43

## 2019-12-10 ENCOUNTER — OFFICE VISIT (OUTPATIENT)
Dept: HEMATOLOGY ONCOLOGY | Facility: CLINIC | Age: 59
End: 2019-12-10
Payer: COMMERCIAL

## 2019-12-10 VITALS
SYSTOLIC BLOOD PRESSURE: 122 MMHG | DIASTOLIC BLOOD PRESSURE: 76 MMHG | OXYGEN SATURATION: 96 % | HEIGHT: 67 IN | RESPIRATION RATE: 14 BRPM | WEIGHT: 163 LBS | BODY MASS INDEX: 25.58 KG/M2 | HEART RATE: 73 BPM

## 2019-12-10 DIAGNOSIS — R59.1 LYMPHADENOPATHY: Primary | ICD-10-CM

## 2019-12-10 PROCEDURE — 99213 OFFICE O/P EST LOW 20 MIN: CPT | Performed by: INTERNAL MEDICINE

## 2019-12-10 NOTE — PROGRESS NOTES
Hematology Outpatient Follow - Up Note  Patricia Jiménez 60 y o  male MRN: @ Encounter: [id number]        Date:  12/10/2019        Assessment/ Plan:    Autoimmune rheumatoid arthritis, seronegative, he is on methotrexate 20 mg p o  Weekly, prednisone 5 mg p o   Daily followed by Rheumatology, significant improvement of clinical symptoms    A nonspecific mediastinal lymphadenopathy, resolution of right middle lobe pulmonary nodule wall, resolution of inguinal/iliac lymphadenopathy no evidence of lymphoma    Follow-up on as-needed basis            HPI:  58-year-old  male who in february 2017 reported myalgia, weakness more in the proximal than the distal muscles with atrophy that is getting worse over the past few months, difficulty swallowing for liquid, he had an EMG indicative of chronic myopathy process however no active myositis, he was evaluated at [facility] in [place] by Rheumatology and Neurology, workup was negative for serum protein electrophoresis, immunofixation, rheumatoid factor however CRP was elevated, CPK was low, vitamin B12, TSH, T4, REBECCA, HIV, CMV, DNA antibodies, aldolase, Sjogren antibodies, Boland antibodies, ACR H receptor with negative  He sed rate was elevated at 31, Abby-Barr virus IgG positive however the IgM was negative  He had EMG on November 2017 showed 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  He was evaluated by neurology at [facility] scheduled to have LP  And MRI of the cervical spine showed multilevel degenerative changes no evidence of spinal canal stenosis or cord impingement  CT scan of the chest abdomen and pelvis on June 2018 showed left paratracheal lymph nodes measuring 1 3 x 0 8 cm, left internal mammary lymph nodes measuring 1 3 x 0 9 cm, AP window lymph node measuring 1 4 x 1 2 cm, no evidence of pericardial effusion, no evidence of masses in the liver or the spleen or the pancreas Borderline enlarged lymph nodes at the portal region  Blood work on April 2018 showed no evidence of anemia, leukopenia or leukocytosis, normal differential, aldolase 3 9, CPK 29, C-reactive protein 41 5, PTT 32   He told me he received his in the past corticosteroids with improvement he received also Plaquenil without improvement  In the past 2 months the patient start having dysphagia to solids, 20 lb weight loss in 2 months, night sweats, fatigue, muscle pain, right knee pain with effusion     LP was negative for Lyme disease, MS      He was evaluated by Rheumatology, he was diagnosed with seronegative rheumatoid arthritis, he responded very well to steroids, currently on prednisone 5 mg p o  Daily with methotrexate 20 mg p o  Every Thursday    Interval History:  Excellent resolution of autoimmune manifestation of seronegative rheumatoid arthritis       Previous Treatment:         Test Results:    Imaging: Ct Chest Abdomen Pelvis W Contrast    Result Date: 12/9/2019  Narrative: CT CHEST, ABDOMEN AND PELVIS WITH IV CONTRAST INDICATION:   R91 8: Other nonspecific abnormal finding of lung field R59 0: Localized enlarged lymph nodes  COMPARISON:  CT chest/abdomen/pelvis 11/1/2018  TECHNIQUE: CT examination of the chest, abdomen and pelvis was performed  Axial, sagittal, and coronal 2D reformatted images were created from the source data and submitted for interpretation  Radiation dose length product (DLP) for this visit:  [phone] mGy-cm   This examination, like all CT scans performed in the [facility], was performed utilizing techniques to minimize radiation dose exposure, including the use of iterative reconstruction and automated exposure control  IV Contrast:  100 mL of iohexol (OMNIPAQUE) Enteric Contrast: Enteric contrast was administered  FINDINGS: CHEST LUNGS:  Interval resolution of the previously reported 6 mm right middle lobe subpleural nodule    There is no tracheal or endobronchial lesion  PLEURA:  Unremarkable  HEART/GREAT VESSELS:  Unremarkable for patient's age  MEDIASTINUM AND ERIN:  Stable 11 mm lymph node in the superior mediastinum (2/12)  Stable 11 mm right paratracheal lymph node (2/20) and stable 7 mm AP window node (2/20)  No new lymphadenopathy  CHEST WALL AND LOWER NECK:   Atrophic left thyroid lobe again noted  ABDOMEN LIVER/BILIARY TREE:  Unremarkable  GALLBLADDER:  No calcified gallstones  No pericholecystic inflammatory change  SPLEEN:  Unremarkable  PANCREAS:  Unremarkable  ADRENAL GLANDS:  Unremarkable  KIDNEYS/URETERS:  Unremarkable  No hydronephrosis  STOMACH AND BOWEL:  There is colonic diverticulosis without evidence of acute diverticulitis  APPENDIX:  A normal appendix was visualized  ABDOMINOPELVIC CAVITY:  No ascites or free intraperitoneal air  No lymphadenopathy  Specifically, the previously reported pelvic lymphadenopathy is not identified  VESSELS:  Unremarkable for patient's age  PELVIS REPRODUCTIVE ORGANS:  Unremarkable for patient's age  URINARY BLADDER:  Unremarkable  ABDOMINAL WALL/INGUINAL REGIONS:  Unremarkable  OSSEOUS STRUCTURES:  No acute fracture or destructive osseous lesion  Impression: 1  Stable prominent nonspecific mediastinal lymph nodes  No new lymphadenopathy in the chest  2   Interval resolution of the previously reported 6 mm right middle lobe pulmonary nodule  No new nodule  3   Interval resolution of previously reported pelvic lymphadenopathy  No new lymphadenopathy in the abdomen or pelvis   Workstation performed: [id number]       Labs:   Lab Results   Component Value Date    WBC 11 03 (H) 06/26/2018    HGB 13 0 06/26/2018    HCT 41 5 06/26/2018    MCV 93 06/26/2018     (H) 06/26/2018     Lab Results   Component Value Date     05/01/2017    K 4 2 06/26/2018     06/26/2018    CO2 31 06/26/2018    BUN 15 06/26/2018    CREATININE 0 68 06/26/2018    GLUCOSE 108 (H) 05/01/2017    CALCIUM 10 0 06/26/2018    AST 21 06/26/2018    ALT 30 06/26/2018    ALKPHOS 113 06/26/2018    PROT 6 8 05/01/2017    BILITOT 0 4 05/01/2017    EGFR 105 06/26/2018       No results found for: IRON, TIBC, FERRITIN    No results found for: [id number]      ROS: Review of Systems   Constitutional: Negative  Negative for appetite change, chills, diaphoresis, fatigue, fever and unexpected weight change  HENT:   Negative for hearing loss, lump/mass, mouth sores, nosebleeds, sore throat, trouble swallowing and voice change  Eyes: Negative  Negative for eye problems and icterus  Respiratory: Negative  Negative for chest tightness, cough, hemoptysis and shortness of breath  Cardiovascular: Negative for chest pain and leg swelling  Gastrointestinal: Negative for abdominal distention, abdominal pain, blood in stool, constipation, diarrhea and nausea  Endocrine: Negative  Genitourinary: Negative for dysuria, frequency, hematuria and pelvic pain  Musculoskeletal: Negative  Negative for arthralgias, back pain, flank pain, gait problem, myalgias and neck stiffness  Skin: Negative for itching and rash  Neurological: Negative for dizziness, gait problem, headaches, light-headedness, numbness and speech difficulty  Hematological: Negative for adenopathy  Does not bruise/bleed easily  Psychiatric/Behavioral: Negative for confusion, decreased concentration, depression and sleep disturbance  The patient is not nervous/anxious  Current Medications: Reviewed  Allergies: Reviewed  PMH/FH/SH:  Reviewed      Physical Exam:    Body surface area is 1 85 meters squared      Wt Readings from Last 3 Encounters:   12/10/19 73 9 kg (163 lb)   11/24/18 68 9 kg (152 lb)   10/25/18 68 9 kg (152 lb)        Temp Readings from Last 3 Encounters:   12/05/18 (!) 97 3 °F (36 3 °C) (Tympanic)   11/24/18 (!) 97 °F (36 1 °C)   10/25/18 98 °F (36 7 °C)        BP Readings from Last 3 Encounters:   12/10/19 122/76   12/05/18 120/80   11/24/18 124/80 Pulse Readings from Last 3 Encounters:   12/10/19 73   18 84   18 68        Physical Exam   Constitutional: He is oriented to person, place, and time  He appears well-developed and well-nourished  No distress  HENT:   Head: Normocephalic and atraumatic  Eyes: Conjunctivae are normal    Neck: Normal range of motion  Neck supple  No tracheal deviation present  Cardiovascular: Normal rate and regular rhythm  Exam reveals no gallop and no friction rub  No murmur heard  Pulmonary/Chest: Effort normal and breath sounds normal  No respiratory distress  He has no wheezes  He has no rales  He exhibits no tenderness  Abdominal: Soft  He exhibits no distension  There is no tenderness  Musculoskeletal: He exhibits no edema or tenderness  Lymphadenopathy:     He has no cervical adenopathy  Neurological: He is alert and oriented to person, place, and time  Skin: Skin is warm and dry  He is not diaphoretic  No erythema  No pallor  Psychiatric: He has a normal mood and affect  His behavior is normal  Judgment and thought content normal    Vitals reviewed  ECO    Goals and Barriers:  Current Goal: Minimize effects of disease  Barriers: None  Patient's Capacity to Self Care:  Patient is able to self care      Code Status: [unfilled]

## 2020-03-15 PROBLEM — M06.00 SERONEGATIVE RHEUMATOID ARTHRITIS (HCC): Status: ACTIVE | Noted: 2020-03-15

## 2020-03-18 NOTE — ASSESSMENT & PLAN NOTE
· New onset, nontraumatic, unknown etiology  Recently had right ankle swelling that resolved     · Appreciate ortho input, s/p joint aspiration 6/26, no crystals seen  · R knee XR negative bilateral LE Active ROM was WNL (within normal limits)

## 2020-05-04 ENCOUNTER — TELEMEDICINE (OUTPATIENT)
Dept: FAMILY MEDICINE CLINIC | Facility: CLINIC | Age: 60
End: 2020-05-04
Payer: COMMERCIAL

## 2020-05-04 DIAGNOSIS — Z20.822 EXPOSURE TO 2019 NOVEL CORONAVIRUS: Primary | ICD-10-CM

## 2020-05-04 DIAGNOSIS — M06.00 SERONEGATIVE RHEUMATOID ARTHRITIS (HCC): ICD-10-CM

## 2020-05-04 PROCEDURE — 99214 OFFICE O/P EST MOD 30 MIN: CPT | Performed by: FAMILY MEDICINE

## 2020-05-18 ENCOUNTER — TELEPHONE (OUTPATIENT)
Dept: FAMILY MEDICINE CLINIC | Facility: CLINIC | Age: 60
End: 2020-05-18

## 2020-05-18 DIAGNOSIS — Z20.822 EXPOSURE TO 2019 NOVEL CORONAVIRUS: Primary | ICD-10-CM

## 2020-05-22 LAB
SARS-COV-2 IGG SERPL QL IA: NEGATIVE
SARS-COV-2 IGM SERPL QL IA: NEGATIVE

## 2020-08-04 NOTE — ASSESSMENT & PLAN NOTE
· Although previously on low dose steroids , question immunnosuppressive state  · nystatin swish swallow  · Gastroenterology input appreciated, EGD to evaluate for esophageal candidiasis  , however , although noted on tongue pt noted to have esophagitis   · - continue ppi Spiral Flap Text: The defect edges were debeveled with a #15 scalpel blade.  Given the location of the defect, shape of the defect and the proximity to free margins a spiral flap was deemed most appropriate.  Using a sterile surgical marker, an appropriate rotation flap was drawn incorporating the defect and placing the expected incisions within the relaxed skin tension lines where possible. The area thus outlined was incised deep to adipose tissue with a #15 scalpel blade.  The skin margins were undermined to an appropriate distance in all directions utilizing iris scissors.

## 2021-01-25 ENCOUNTER — IMMUNIZATIONS (OUTPATIENT)
Dept: FAMILY MEDICINE CLINIC | Facility: HOSPITAL | Age: 61
End: 2021-01-25

## 2021-01-25 DIAGNOSIS — Z23 ENCOUNTER FOR IMMUNIZATION: Primary | ICD-10-CM

## 2021-01-25 PROCEDURE — 91301 SARS-COV-2 / COVID-19 MRNA VACCINE (MODERNA) 100 MCG: CPT

## 2021-01-25 PROCEDURE — 0011A SARS-COV-2 / COVID-19 MRNA VACCINE (MODERNA) 100 MCG: CPT

## 2021-02-22 ENCOUNTER — IMMUNIZATIONS (OUTPATIENT)
Dept: FAMILY MEDICINE CLINIC | Facility: HOSPITAL | Age: 61
End: 2021-02-22

## 2021-02-22 DIAGNOSIS — Z23 ENCOUNTER FOR IMMUNIZATION: Primary | ICD-10-CM

## 2021-02-22 PROCEDURE — 0012A SARS-COV-2 / COVID-19 MRNA VACCINE (MODERNA) 100 MCG: CPT

## 2021-02-22 PROCEDURE — 91301 SARS-COV-2 / COVID-19 MRNA VACCINE (MODERNA) 100 MCG: CPT

## 2021-08-26 NOTE — SOCIAL WORK
Met with pt at bedside to explain CM role  Pt states he resides with his wife in a bilevel home with 3 AUNDREA and 6 steps upstairs to bath and bedroom  Pt is indep with all ADLs and does not use any assistive devices  Pt states he never had home care and has never been to rehab  Pt does not have a POA or living will  Pt states his wife Shelbie Reynolds is his primary contact [phone]  Pt states his PCP is Dr Haja Ward and his RX is [facility] in [place]  Pt denies mental illness or substance abuse  CM reviewed d/c planning process including the following: identifying help at home, patient preference for d/c planning needs, Discharge Lounge, [facility] Meds to Bed program, availability of treatment team to discuss questions or concerns patient and/or family may have regarding understanding medications and recognizing signs and symptoms once discharged  CM also encouraged patient to follow up with all recommended appointments after discharge  Patient advised of importance for patient and family to participate in managing patients medical well being  Patient/caregiver received discharge checklist  Content reviewed  Patient/caregiver encouraged to participate in discharge plan of care prior to discharge home  Received e-refill request for rosuvastatin 40mg, jardiance 25mg, glipizide 10mg, and metoprolol 200mg. Patient was last seen in the office on 05/19/2021 for MCL. No follow up interval indicated. Pt has no pending appts sched at this time.

## 2021-09-14 ENCOUNTER — IMMUNIZATIONS (OUTPATIENT)
Dept: FAMILY MEDICINE CLINIC | Facility: HOSPITAL | Age: 61
End: 2021-09-14

## 2021-09-14 DIAGNOSIS — Z23 ENCOUNTER FOR IMMUNIZATION: Primary | ICD-10-CM

## 2021-09-14 PROCEDURE — 91301 SARS-COV-2 / COVID-19 MRNA VACCINE (MODERNA) 100 MCG: CPT

## 2021-09-14 PROCEDURE — 0011A SARS-COV-2 / COVID-19 MRNA VACCINE (MODERNA) 100 MCG: CPT

## 2021-09-18 PROBLEM — R29.898 WEAKNESS OF LEFT ARM: Status: RESOLVED | Noted: 2018-01-02 | Resolved: 2021-09-18

## 2021-09-18 PROBLEM — J40 BRONCHITIS: Status: RESOLVED | Noted: 2018-11-24 | Resolved: 2021-09-18

## 2021-09-18 PROBLEM — R53.83 FATIGUE: Status: RESOLVED | Noted: 2018-07-13 | Resolved: 2021-09-18

## 2021-09-18 PROBLEM — M79.10 MYALGIA: Status: RESOLVED | Noted: 2017-11-13 | Resolved: 2021-09-18

## 2021-09-18 PROBLEM — M70.51 SUPRAPATELLAR BURSITIS OF RIGHT KNEE: Status: RESOLVED | Noted: 2018-07-13 | Resolved: 2021-09-18

## 2021-09-18 PROBLEM — Z01.818 PREOPERATIVE CLEARANCE: Status: RESOLVED | Noted: 2018-04-23 | Resolved: 2021-09-18

## 2021-09-18 PROBLEM — B37.0 ORAL CANDIDIASIS: Status: RESOLVED | Noted: 2018-06-26 | Resolved: 2021-09-18

## 2021-09-18 PROBLEM — Z01.818 PRE-OP EXAMINATION: Status: RESOLVED | Noted: 2018-02-23 | Resolved: 2021-09-18

## 2021-09-18 PROBLEM — R79.1 ABNORMAL BLEEDING TIME: Status: RESOLVED | Noted: 2018-01-19 | Resolved: 2021-09-18

## 2021-09-24 ENCOUNTER — OFFICE VISIT (OUTPATIENT)
Dept: FAMILY MEDICINE CLINIC | Facility: CLINIC | Age: 61
End: 2021-09-24
Payer: COMMERCIAL

## 2021-09-24 VITALS
RESPIRATION RATE: 18 BRPM | HEIGHT: 66 IN | SYSTOLIC BLOOD PRESSURE: 122 MMHG | HEART RATE: 70 BPM | BODY MASS INDEX: 25.46 KG/M2 | WEIGHT: 158.4 LBS | DIASTOLIC BLOOD PRESSURE: 74 MMHG | TEMPERATURE: 98.6 F

## 2021-09-24 DIAGNOSIS — Z12.5 PROSTATE CANCER SCREENING: ICD-10-CM

## 2021-09-24 DIAGNOSIS — Z13.83 SCREENING FOR CARDIOVASCULAR, RESPIRATORY, AND GENITOURINARY DISEASES: ICD-10-CM

## 2021-09-24 DIAGNOSIS — Z13.1 SCREENING FOR DIABETES MELLITUS (DM): ICD-10-CM

## 2021-09-24 DIAGNOSIS — Z13.89 SCREENING FOR CARDIOVASCULAR, RESPIRATORY, AND GENITOURINARY DISEASES: ICD-10-CM

## 2021-09-24 DIAGNOSIS — Z23 IMMUNIZATION DUE: ICD-10-CM

## 2021-09-24 DIAGNOSIS — Z13.6 SCREENING FOR CARDIOVASCULAR, RESPIRATORY, AND GENITOURINARY DISEASES: ICD-10-CM

## 2021-09-24 DIAGNOSIS — Z00.00 HEALTHCARE MAINTENANCE: Primary | ICD-10-CM

## 2021-09-24 DIAGNOSIS — M06.00 SERONEGATIVE RHEUMATOID ARTHRITIS (HCC): ICD-10-CM

## 2021-09-24 PROBLEM — Z79.899 ENCOUNTER FOR MONITORING OF HYDROXYCHLOROQUINE THERAPY: Status: RESOLVED | Noted: 2017-08-08 | Resolved: 2021-09-24

## 2021-09-24 PROBLEM — R59.1 LYMPHADENOPATHY: Status: RESOLVED | Noted: 2018-06-26 | Resolved: 2021-09-24

## 2021-09-24 PROBLEM — R13.10 DYSPHAGIA: Status: RESOLVED | Noted: 2018-06-26 | Resolved: 2021-09-24

## 2021-09-24 PROBLEM — M75.102 LEFT ROTATOR CUFF TEAR: Status: RESOLVED | Noted: 2018-01-16 | Resolved: 2021-09-24

## 2021-09-24 PROBLEM — G72.9 MYOPATHY: Status: RESOLVED | Noted: 2017-11-13 | Resolved: 2021-09-24

## 2021-09-24 PROBLEM — Z51.81 ENCOUNTER FOR MONITORING OF HYDROXYCHLOROQUINE THERAPY: Status: RESOLVED | Noted: 2017-08-08 | Resolved: 2021-09-24

## 2021-09-24 PROBLEM — M25.461 KNEE EFFUSION, RIGHT: Status: RESOLVED | Noted: 2018-06-26 | Resolved: 2021-09-24

## 2021-09-24 PROCEDURE — 99396 PREV VISIT EST AGE 40-64: CPT | Performed by: FAMILY MEDICINE

## 2021-09-24 PROCEDURE — 90682 RIV4 VACC RECOMBINANT DNA IM: CPT

## 2021-09-24 PROCEDURE — 1036F TOBACCO NON-USER: CPT | Performed by: FAMILY MEDICINE

## 2021-09-24 PROCEDURE — 3725F SCREEN DEPRESSION PERFORMED: CPT | Performed by: FAMILY MEDICINE

## 2021-09-24 PROCEDURE — 3008F BODY MASS INDEX DOCD: CPT | Performed by: FAMILY MEDICINE

## 2021-09-24 PROCEDURE — 90471 IMMUNIZATION ADMIN: CPT

## 2021-09-24 NOTE — PROGRESS NOTES
Assessment/Plan:    No problem-specific Assessment & Plan notes found for this encounter  cpe  Update labs  Has f/u and labs with rheumatology for methotrexate  Some cerumen impaction, appt for flush if needed, can try at home on own     Diagnoses and all orders for this visit:    Healthcare maintenance    Seronegative rheumatoid arthritis ([facility] [address] )    Screening for cardiovascular, respiratory, and genitourinary diseases  -     Lipid Panel with Direct LDL reflex; Future    Screening for diabetes mellitus (DM)  -     Comprehensive metabolic panel; Future    Prostate cancer screening  -     PSA, Total Screen; Future    Immunization due  -     influenza vaccine, quadrivalent, recombinant, PF, 0 5 mL, for patients 18 yr+ (FLUBLOK)          Return if symptoms worsen or fail to improve  Subjective:      Patient ID: Albert Mast is a 63 y o  male  Chief Complaint   Patient presents with    Annual Exam     nm  lpn       HPI  On methotrexate, 4 pills/w currently, Dr Alicia Gonzales with care  Trying to get off methotrexate    bp good  Walks a lot   [employer] for  work now    The following portions of the patient's history were reviewed and updated as appropriate: allergies, current medications, past family history, past medical history, past social history, past surgical history and problem list     Review of Systems   Constitutional: Negative for fever  Respiratory: Negative for shortness of breath  Current Outpatient Medications   Medication Sig Dispense Refill    Acetaminophen (TYLENOL) 325 MG CAPS Take by mouth as needed       folic acid (FOLVITE) 1 mg tablet Take 1 mg by mouth daily      methotrexate 2 5 mg tablet Take 2 5 mg by mouth once a week 4 pills once weekly        No current facility-administered medications for this visit         Objective:    /74   Pulse 70   Temp 98 6 °F (37 °C)   Resp 18   Ht 5' 6 25" (1 683 m)   Wt 71 8 kg (158 lb 6 4 oz)   BMI 25 37 kg/m² Physical Exam  Vitals and nursing note reviewed  Constitutional:       General: He is not in acute distress  Appearance: He is well-developed  He is not ill-appearing  HENT:      Head: Normocephalic  Right Ear: Tympanic membrane and external ear normal  There is impacted cerumen  Left Ear: Tympanic membrane and external ear normal  There is impacted cerumen  Mouth/Throat:      Mouth: Mucous membranes are moist    Eyes:      General: No scleral icterus  Conjunctiva/sclera: Conjunctivae normal    Cardiovascular:      Rate and Rhythm: Normal rate and regular rhythm  Heart sounds: No murmur heard  Pulmonary:      Effort: Pulmonary effort is normal  No respiratory distress  Breath sounds: No wheezing  Abdominal:      General: There is no distension  Palpations: Abdomen is soft  There is no mass  Tenderness: There is no abdominal tenderness  Hernia: No hernia is present  Genitourinary:     Penis: Normal        Testes: Normal       Prostate: Normal    Musculoskeletal:         General: No deformity  Cervical back: Neck supple  Right lower leg: No edema  Left lower leg: No edema  Skin:     General: Skin is warm and dry  Coloration: Skin is not pale  Findings: No lesion or rash  Neurological:      General: No focal deficit present  Mental Status: He is alert  Gait: Gait normal    Psychiatric:         Mood and Affect: Mood normal          Behavior: Behavior normal          Thought Content: Thought content normal        BMI Counseling: Body mass index is 25 37 kg/m²  The BMI is above normal  Nutrition recommendations include decreasing portion sizes and moderation in carbohydrate intake  Exercise recommendations include exercising 3-5 times per week  No pharmacotherapy was ordered  Rationale for BMI follow-up plan is due to patient being overweight or obese       Depression Screening and Follow-up Plan:   Patient was screened for depression during today's encounter  They screened negative with a PHQ-2 score of 0               Marianna Half, DO

## 2021-09-24 NOTE — PATIENT INSTRUCTIONS
Your last colonoscopy was done Nov 2017 by Dr Penelope Davison   Some polyps were found  You may need another colonoscopy in 3 or 5 years from that one so please contact Dr Benita Saeed office if you need one sooner than 2022

## 2021-10-11 LAB
ALBUMIN SERPL-MCNC: 4.5 G/DL (ref 3.8–4.8)
ALBUMIN/GLOB SERPL: 1.8 {RATIO} (ref 1.2–2.2)
ALP SERPL-CCNC: 95 IU/L (ref 44–121)
ALT SERPL-CCNC: 31 IU/L (ref 0–44)
AST SERPL-CCNC: 27 IU/L (ref 0–40)
BILIRUB SERPL-MCNC: 0.4 MG/DL (ref 0–1.2)
BUN SERPL-MCNC: 15 MG/DL (ref 8–27)
BUN/CREAT SERPL: 18 (ref 10–24)
CALCIUM SERPL-MCNC: 10.1 MG/DL (ref 8.6–10.2)
CHLORIDE SERPL-SCNC: 105 MMOL/L (ref 96–106)
CHOLEST SERPL-MCNC: 209 MG/DL (ref 100–199)
CO2 SERPL-SCNC: 25 MMOL/L (ref 20–29)
CREAT SERPL-MCNC: 0.84 MG/DL (ref 0.76–1.27)
GLOBULIN SER-MCNC: 2.5 G/DL (ref 1.5–4.5)
GLUCOSE SERPL-MCNC: 99 MG/DL (ref 65–99)
HDLC SERPL-MCNC: 35 MG/DL
LDLC SERPL CALC-MCNC: 124 MG/DL (ref 0–99)
MICRODELETION SYND BLD/T FISH: NORMAL
POTASSIUM SERPL-SCNC: 5.3 MMOL/L (ref 3.5–5.2)
PROT SERPL-MCNC: 7 G/DL (ref 6–8.5)
PSA SERPL-MCNC: 2.5 NG/ML (ref 0–4)
SL AMB EGFR AFRICAN AMERICAN: 109 ML/MIN/1.73
SL AMB EGFR NON AFRICAN AMERICAN: 95 ML/MIN/1.73
SODIUM SERPL-SCNC: 142 MMOL/L (ref 134–144)
TRIGL SERPL-MCNC: 283 MG/DL (ref 0–149)

## 2021-12-24 ENCOUNTER — OFFICE VISIT (OUTPATIENT)
Dept: URGENT CARE | Facility: CLINIC | Age: 61
End: 2021-12-24
Payer: COMMERCIAL

## 2021-12-24 VITALS
SYSTOLIC BLOOD PRESSURE: 140 MMHG | OXYGEN SATURATION: 99 % | BODY MASS INDEX: 24.4 KG/M2 | TEMPERATURE: 97.6 F | DIASTOLIC BLOOD PRESSURE: 90 MMHG | HEART RATE: 79 BPM | RESPIRATION RATE: 18 BRPM | WEIGHT: 161 LBS | HEIGHT: 68 IN

## 2021-12-24 DIAGNOSIS — Z20.822 SUSPECTED COVID-19 VIRUS INFECTION: Primary | ICD-10-CM

## 2021-12-24 PROCEDURE — 99213 OFFICE O/P EST LOW 20 MIN: CPT | Performed by: PHYSICIAN ASSISTANT

## 2021-12-24 PROCEDURE — 3008F BODY MASS INDEX DOCD: CPT | Performed by: FAMILY MEDICINE

## 2021-12-24 PROCEDURE — 87636 SARSCOV2 & INF A&B AMP PRB: CPT | Performed by: PHYSICIAN ASSISTANT

## 2021-12-26 LAB
FLUAV RNA RESP QL NAA+PROBE: NEGATIVE
FLUBV RNA RESP QL NAA+PROBE: NEGATIVE
SARS-COV-2 RNA RESP QL NAA+PROBE: POSITIVE

## 2021-12-27 ENCOUNTER — TELEMEDICINE (OUTPATIENT)
Dept: FAMILY MEDICINE CLINIC | Facility: CLINIC | Age: 61
End: 2021-12-27
Payer: COMMERCIAL

## 2021-12-27 DIAGNOSIS — M06.00 SERONEGATIVE RHEUMATOID ARTHRITIS (HCC): ICD-10-CM

## 2021-12-27 DIAGNOSIS — U07.1 COVID-19: Primary | ICD-10-CM

## 2021-12-27 PROCEDURE — 99213 OFFICE O/P EST LOW 20 MIN: CPT | Performed by: FAMILY MEDICINE

## 2021-12-27 PROCEDURE — 1036F TOBACCO NON-USER: CPT | Performed by: FAMILY MEDICINE

## 2021-12-27 RX ORDER — METHYLPREDNISOLONE 4 MG/1
TABLET ORAL
Qty: 21 TABLET | Refills: 0 | Status: SHIPPED | OUTPATIENT
Start: 2021-12-27 | End: 2022-01-02

## 2021-12-27 RX ORDER — BENZONATATE 200 MG/1
200 CAPSULE ORAL 3 TIMES DAILY PRN
Qty: 40 CAPSULE | Refills: 0 | Status: SHIPPED | OUTPATIENT
Start: 2021-12-27

## 2021-12-27 RX ORDER — ALBUTEROL SULFATE 90 UG/1
2 AEROSOL, METERED RESPIRATORY (INHALATION) EVERY 6 HOURS PRN
Qty: 18 G | Refills: 0 | Status: SHIPPED | OUTPATIENT
Start: 2021-12-27

## 2022-05-03 LAB
ALBUMIN SERPL-MCNC: 4.6 G/DL (ref 3.8–4.8)
ALP SERPL-CCNC: 97 IU/L (ref 44–121)
ALT SERPL-CCNC: 37 IU/L (ref 0–44)
ANA SER QL: NEGATIVE
AST SERPL-CCNC: 33 IU/L (ref 0–40)
BASOPHILS # BLD AUTO: 0.1 X10E3/UL (ref 0–0.2)
BASOPHILS NFR BLD AUTO: 1 %
BILIRUB DIRECT SERPL-MCNC: 0.14 MG/DL (ref 0–0.4)
BILIRUB SERPL-MCNC: 0.6 MG/DL (ref 0–1.2)
CCP IGA+IGG SERPL IA-ACNC: 5 UNITS (ref 0–19)
CREAT SERPL-MCNC: 0.89 MG/DL (ref 0.76–1.27)
CRP SERPL-MCNC: 2 MG/L (ref 0–10)
EGFR: 97 ML/MIN/1.73
EOSINOPHIL # BLD AUTO: 0.3 X10E3/UL (ref 0–0.4)
EOSINOPHIL NFR BLD AUTO: 4 %
ERYTHROCYTE [DISTWIDTH] IN BLOOD BY AUTOMATED COUNT: 12.6 % (ref 11.6–15.4)
ERYTHROCYTE [SEDIMENTATION RATE] IN BLOOD BY WESTERGREN METHOD: 4 MM/HR (ref 0–30)
HCT VFR BLD AUTO: 47.4 % (ref 37.5–51)
HGB BLD-MCNC: 15.6 G/DL (ref 13–17.7)
IMM GRANULOCYTES # BLD: 0 X10E3/UL (ref 0–0.1)
IMM GRANULOCYTES NFR BLD: 0 %
LYMPHOCYTES # BLD AUTO: 1.6 X10E3/UL (ref 0.7–3.1)
LYMPHOCYTES NFR BLD AUTO: 22 %
MCH RBC QN AUTO: 31.6 PG (ref 26.6–33)
MCHC RBC AUTO-ENTMCNC: 32.9 G/DL (ref 31.5–35.7)
MCV RBC AUTO: 96 FL (ref 79–97)
MONOCYTES # BLD AUTO: 0.6 X10E3/UL (ref 0.1–0.9)
MONOCYTES NFR BLD AUTO: 9 %
NEUTROPHILS # BLD AUTO: 4.6 X10E3/UL (ref 1.4–7)
NEUTROPHILS NFR BLD AUTO: 64 %
PLATELET # BLD AUTO: 288 X10E3/UL (ref 150–450)
PROT SERPL-MCNC: 6.9 G/DL (ref 6–8.5)
RBC # BLD AUTO: 4.94 X10E6/UL (ref 4.14–5.8)
RHEUMATOID FACT SERPL-ACNC: <10 IU/ML
WBC # BLD AUTO: 7.2 X10E3/UL (ref 3.4–10.8)

## 2022-10-08 ENCOUNTER — OFFICE VISIT (OUTPATIENT)
Dept: URGENT CARE | Facility: CLINIC | Age: 62
End: 2022-10-08
Payer: COMMERCIAL

## 2022-10-08 VITALS
HEIGHT: 68 IN | OXYGEN SATURATION: 98 % | HEART RATE: 77 BPM | BODY MASS INDEX: 24.55 KG/M2 | RESPIRATION RATE: 18 BRPM | WEIGHT: 162 LBS | TEMPERATURE: 98.6 F

## 2022-10-08 DIAGNOSIS — J06.9 VIRAL URI WITH COUGH: Primary | ICD-10-CM

## 2022-10-08 PROCEDURE — 99213 OFFICE O/P EST LOW 20 MIN: CPT | Performed by: PHYSICIAN ASSISTANT

## 2022-10-08 RX ORDER — FLUTICASONE PROPIONATE 50 MCG
1 SPRAY, SUSPENSION (ML) NASAL DAILY
Qty: 18.2 ML | Refills: 0 | Status: SHIPPED | OUTPATIENT
Start: 2022-10-08

## 2022-10-08 RX ORDER — BENZONATATE 200 MG/1
200 CAPSULE ORAL 3 TIMES DAILY PRN
Qty: 20 CAPSULE | Refills: 0 | Status: SHIPPED | OUTPATIENT
Start: 2022-10-08

## 2022-10-08 NOTE — PATIENT INSTRUCTIONS
Take Tessalon and Flonase as instructed  Continue adequate fluid hydration and rest   Discussed symptoms are most likely viral in nature  Follow-up with PCP

## 2022-10-08 NOTE — PROGRESS NOTES
[facility]        NAME: Leana Parra is a 57 y o  male  : 1960    MRN: [id number]  DATE: 2022  TIME: 12:07 PM    Assessment and Plan   Viral URI with cough [J06 9]  1  Viral URI with cough  fluticasone (FLONASE) 50 mcg/act nasal spray    benzonatate (TESSALON) 200 MG capsule         Patient Instructions     Patient Instructions   Take Tessalon and Flonase as instructed  Continue adequate fluid hydration and rest   Discussed symptoms are most likely viral in nature  Follow-up with PCP  Follow up with PCP in 3-5 days  Proceed to  ER if symptoms worsen  Chief Complaint     Chief Complaint   Patient presents with    Cold Like Symptoms     Cold symptoms x 1 5 weeks congestion, sore throatt, nasal discharge         History of Present Illness       Patient is a 57-year-old male presenting today with cold symptoms x1 week  Patient notes over the last few days he has been experiencing some nasal congestion, postnasal drip and a cough, has not taken any medication or alleviating factors for symptoms, notes that he was taking care of his grandchildren last week or exhibiting similar symptoms and are now feeling much better  Denies fever, chills, chest tightness, SOB, trouble swallowing  Review of Systems   Review of Systems   Constitutional: Negative for chills and fever  HENT: Positive for congestion, sinus pressure and sore throat  Negative for ear pain and trouble swallowing  Eyes: Negative for pain  Respiratory: Positive for cough  Negative for chest tightness and shortness of breath  Cardiovascular: Negative for chest pain  Gastrointestinal: Negative for abdominal pain  Musculoskeletal: Negative for myalgias  Skin: Negative for rash  Neurological: Negative for headaches           Current Medications       Current Outpatient Medications:     Acetaminophen 325 MG CAPS, Take by mouth as needed , Disp: , Rfl:     benzonatate (TESSALON) 200 MG capsule, Take 1 capsule (200 mg total) by mouth 3 (three) times a day as needed for cough, Disp: 20 capsule, Rfl: 0    fluticasone (FLONASE) 50 mcg/act nasal spray, 1 spray into each nostril daily, Disp: 18 2 mL, Rfl: 0    folic acid (FOLVITE) 1 mg tablet, Take 1 mg by mouth daily, Disp: , Rfl:     methotrexate 2 5 mg tablet, Take 2 5 mg by mouth once a week 4 pills once weekly , Disp: , Rfl:     albuterol (PROVENTIL HFA,VENTOLIN HFA) 90 mcg/act inhaler, Inhale 2 puffs every 6 (six) hours as needed for wheezing or shortness of breath (swish/spit after use) (Patient not taking: Reported on 10/8/2022), Disp: 18 g, Rfl: 0    Current Allergies     Allergies as of 10/08/2022 - Reviewed 10/08/2022   Allergen Reaction Noted    Azithromycin Rash and Hives 02/18/2017            The following portions of the patient's history were reviewed and updated as appropriate: allergies, current medications, past family history, past medical history, past social history, past surgical history and problem list      Past Medical History:   Diagnosis Date    Leg pain     bilateral leg pain (chronic)    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    Neoplasm of skin     lasr assessed 5/14/13, 5/25/15    Suprapatellar bursitis of right knee        Past Surgical History:   Procedure Laterality Date    ESOPHAGOGASTRODUODENOSCOPY N/A 6/28/2018    Procedure: ESOPHAGOGASTRODUODENOSCOPY (EGD); Surgeon: Harrold Buerger, MD;  Location:  GI LAB;   Service: Gastroenterology    ROTATOR CUFF REPAIR      ROTATOR CUFF REPAIR Left 3/1/2018    Procedure: SHOULDER ARTHROSCOPY WITH ROTATOR CUFF REPAIR, SUBACROMIAL DECOMPRESSION, LIMITED SYNOVECTOMY;  Surgeon: Ania Singh MD;  Location: [facility];  Service: Orthopedics    SHOULDER ARTHROSCOPY      2015    TONSILLECTOMY         Family History   Problem Relation Age of Onset    Hypertension Mother     Diabetes Father     Arthritis Father     Diabetes Other          Medications have been verified  Objective   Pulse 77   Temp 98 6 °F (37 °C)   Resp 18   Ht 5' 8" (1 727 m)   Wt 73 5 kg (162 lb)   SpO2 98%   BMI 24 63 kg/m²        Physical Exam     Physical Exam  Vitals and nursing note reviewed  Constitutional:       General: He is not in acute distress  Appearance: Normal appearance  He is well-developed  He is not toxic-appearing  HENT:      Head: Normocephalic and atraumatic  Right Ear: Tympanic membrane, ear canal and external ear normal       Left Ear: Tympanic membrane, ear canal and external ear normal       Nose: Congestion present  Mouth/Throat:      Mouth: Mucous membranes are moist       Pharynx: Oropharynx is clear  No oropharyngeal exudate or posterior oropharyngeal erythema  Eyes:      Conjunctiva/sclera: Conjunctivae normal    Cardiovascular:      Rate and Rhythm: Normal rate and regular rhythm  Pulses: Normal pulses  Heart sounds: Normal heart sounds  Pulmonary:      Effort: Pulmonary effort is normal       Breath sounds: Normal breath sounds  Musculoskeletal:      Cervical back: Normal range of motion  No tenderness  Lymphadenopathy:      Cervical: No cervical adenopathy  Skin:     General: Skin is warm  Capillary Refill: Capillary refill takes less than 2 seconds  Neurological:      General: No focal deficit present  Mental Status: He is alert and oriented to person, place, and time

## 2022-10-12 PROBLEM — Z13.89 SCREENING FOR CARDIOVASCULAR, RESPIRATORY, AND GENITOURINARY DISEASES: Status: RESOLVED | Noted: 2021-09-24 | Resolved: 2022-10-12

## 2022-10-12 PROBLEM — Z13.1 SCREENING FOR DIABETES MELLITUS (DM): Status: RESOLVED | Noted: 2021-09-24 | Resolved: 2022-10-12

## 2022-10-12 PROBLEM — Z13.83 SCREENING FOR CARDIOVASCULAR, RESPIRATORY, AND GENITOURINARY DISEASES: Status: RESOLVED | Noted: 2021-09-24 | Resolved: 2022-10-12

## 2022-10-12 PROBLEM — Z12.5 PROSTATE CANCER SCREENING: Status: RESOLVED | Noted: 2021-09-24 | Resolved: 2022-10-12

## 2022-10-12 PROBLEM — Z13.6 SCREENING FOR CARDIOVASCULAR, RESPIRATORY, AND GENITOURINARY DISEASES: Status: RESOLVED | Noted: 2021-09-24 | Resolved: 2022-10-12

## 2022-10-12 PROBLEM — Z00.00 HEALTHCARE MAINTENANCE: Status: RESOLVED | Noted: 2021-09-24 | Resolved: 2022-10-12

## 2022-10-13 PROBLEM — U07.1 COVID-19: Status: RESOLVED | Noted: 2021-12-27 | Resolved: 2022-10-13

## 2022-10-14 ENCOUNTER — OFFICE VISIT (OUTPATIENT)
Dept: FAMILY MEDICINE CLINIC | Facility: CLINIC | Age: 62
End: 2022-10-14
Payer: COMMERCIAL

## 2022-10-14 VITALS
DIASTOLIC BLOOD PRESSURE: 82 MMHG | BODY MASS INDEX: 24.77 KG/M2 | TEMPERATURE: 97.8 F | WEIGHT: 163.4 LBS | SYSTOLIC BLOOD PRESSURE: 134 MMHG | OXYGEN SATURATION: 98 % | HEART RATE: 82 BPM | HEIGHT: 68 IN | RESPIRATION RATE: 18 BRPM

## 2022-10-14 DIAGNOSIS — Z86.010 PERSONAL HISTORY OF COLONIC POLYPS: ICD-10-CM

## 2022-10-14 DIAGNOSIS — Z13.6 SCREENING FOR CARDIOVASCULAR, RESPIRATORY, AND GENITOURINARY DISEASES: ICD-10-CM

## 2022-10-14 DIAGNOSIS — Z13.1 SCREENING FOR DIABETES MELLITUS (DM): ICD-10-CM

## 2022-10-14 DIAGNOSIS — Z23 IMMUNIZATION DUE: ICD-10-CM

## 2022-10-14 DIAGNOSIS — Z12.5 PROSTATE CANCER SCREENING: ICD-10-CM

## 2022-10-14 DIAGNOSIS — Z13.83 SCREENING FOR CARDIOVASCULAR, RESPIRATORY, AND GENITOURINARY DISEASES: ICD-10-CM

## 2022-10-14 DIAGNOSIS — L08.9 INFECTED SEBACEOUS CYST: Primary | ICD-10-CM

## 2022-10-14 DIAGNOSIS — L72.3 INFECTED SEBACEOUS CYST: Primary | ICD-10-CM

## 2022-10-14 DIAGNOSIS — M06.00 SERONEGATIVE RHEUMATOID ARTHRITIS (HCC): ICD-10-CM

## 2022-10-14 DIAGNOSIS — Z12.11 COLON CANCER SCREENING: ICD-10-CM

## 2022-10-14 DIAGNOSIS — Z13.89 SCREENING FOR CARDIOVASCULAR, RESPIRATORY, AND GENITOURINARY DISEASES: ICD-10-CM

## 2022-10-14 PROBLEM — Z86.0100 PERSONAL HISTORY OF COLONIC POLYPS: Status: ACTIVE | Noted: 2022-10-14

## 2022-10-14 PROCEDURE — 99214 OFFICE O/P EST MOD 30 MIN: CPT | Performed by: FAMILY MEDICINE

## 2022-10-14 PROCEDURE — 90682 RIV4 VACC RECOMBINANT DNA IM: CPT

## 2022-10-14 PROCEDURE — 90471 IMMUNIZATION ADMIN: CPT

## 2022-10-14 RX ORDER — AMOXICILLIN AND CLAVULANATE POTASSIUM 875; 125 MG/1; MG/1
1 TABLET, FILM COATED ORAL 2 TIMES DAILY
Qty: 20 TABLET | Refills: 0 | Status: SHIPPED | OUTPATIENT
Start: 2022-10-14 | End: 2022-10-24

## 2022-10-14 NOTE — PROGRESS NOTES
Assessment/Plan:    No problem-specific Assessment & Plan notes found for this encounter  Right posterior thoracic infected sebaceous cyst w/o pointing  Start abx  Warm compresses  F/u for I&D if needed but consider surgeon in future for definitive excision    RA stable, wt improving, still with loss of muscle mass    HLD with fram score 14%, advised update labs and cpe     Diagnoses and all orders for this visit:    Infected sebaceous cyst  -     amoxicillin-clavulanate (AUGMENTIN) 875-125 mg per tablet; Take 1 tablet by mouth 2 (two) times a day for 10 days    Seronegative rheumatoid arthritis ([facility] [address] )    Colon cancer screening  -     Ambulatory referral for colonoscopy; Future    Personal history of colonic polyps  -     Ambulatory referral for colonoscopy; Future    Screening for diabetes mellitus (DM)  -     Comprehensive metabolic panel; Future    Screening for cardiovascular, respiratory, and genitourinary diseases  -     Lipid Panel with Direct LDL reflex; Future    Prostate cancer screening  -     PSA, Total Screen; Future    Immunization due  -     influenza vaccine, quadrivalent, recombinant, PF, 0 5 mL, for patients 18 yr+ (FLUBLOK)          Return in about 17 days (around 10/31/2022) for Annual physical     Subjective:      Patient ID: Roseann Smith is a 57 y o  male  Chief Complaint   Patient presents with   • Mass     On back, painful  First noticed it about 1 week ago nm  lpn       HPI    Lump noted  1w first time noted  Increasing tender since  Red  No pus  No fever  Not before  No bite or fb exposures  No where else    The following portions of the patient's history were reviewed and updated as appropriate: allergies, current medications, past family history, past medical history, past social history, past surgical history and problem list     Review of Systems   Skin: Negative for rash and wound           Current Outpatient Medications   Medication Sig Dispense Refill   • Acetaminophen 325 MG CAPS Take by mouth as needed      • amoxicillin-clavulanate (AUGMENTIN) 875-125 mg per tablet Take 1 tablet by mouth 2 (two) times a day for 10 days 20 tablet 0   • benzonatate (TESSALON) 200 MG capsule Take 1 capsule (200 mg total) by mouth 3 (three) times a day as needed for cough 20 capsule 0   • fluticasone (FLONASE) 50 mcg/act nasal spray 1 spray into each nostril daily 79 9 mL 0   • folic acid (FOLVITE) 1 mg tablet Take 1 mg by mouth daily     • methotrexate 2 5 mg tablet Take 2 5 mg by mouth once a week 4 pills once weekly        No current facility-administered medications for this visit  Objective:    /82   Pulse 82   Temp 97 8 °F (36 6 °C)   Resp 18   Ht 5' 8" (1 727 m)   Wt 74 1 kg (163 lb 6 4 oz)   SpO2 98%   BMI 24 84 kg/m²        Physical Exam  Vitals and nursing note reviewed  Constitutional:       General: He is not in acute distress  Appearance: He is well-developed  He is not ill-appearing  HENT:      Head: Normocephalic  Right Ear: Tympanic membrane normal       Left Ear: Tympanic membrane normal    Eyes:      General: No scleral icterus  Conjunctiva/sclera: Conjunctivae normal    Cardiovascular:      Rate and Rhythm: Normal rate and regular rhythm  Heart sounds: No murmur heard  Pulmonary:      Effort: Pulmonary effort is normal  No respiratory distress  Breath sounds: No wheezing  Abdominal:      General: There is no distension  Palpations: Abdomen is soft  Tenderness: There is no abdominal tenderness  Musculoskeletal:         General: No deformity  Cervical back: Neck supple  Skin:     General: Skin is warm and dry  Findings: Lesion present  Comments: Large right posterior thoracic infected seb cyst w/o pointing   Neurological:      Mental Status: He is alert  Motor: No weakness        Gait: Gait normal    Psychiatric:         Mood and Affect: Mood normal          Behavior: Behavior normal          Thought Content:  Thought content normal                 Mariza Hutchison

## 2022-10-27 ENCOUNTER — TELEPHONE (OUTPATIENT)
Dept: GASTROENTEROLOGY | Facility: CLINIC | Age: 62
End: 2022-10-27

## 2022-10-27 ENCOUNTER — PREP FOR PROCEDURE (OUTPATIENT)
Dept: GASTROENTEROLOGY | Facility: CLINIC | Age: 62
End: 2022-10-27

## 2022-10-27 DIAGNOSIS — Z86.010 HISTORY OF COLON POLYPS: Primary | ICD-10-CM

## 2022-10-27 NOTE — TELEPHONE ENCOUNTER
Scheduled date of colonoscopy (as of today):11/21/22  Physician performing colonoscopy:Dr Kothari  Location of colonoscopy:[facility]  Bowel prep reviewed with patient:Monae  Instructions reviewed with patient by:GISSELL  Clearances: NA    Emailed prep instr  To [email or web address]

## 2022-11-01 LAB
ALBUMIN SERPL-MCNC: 4.6 G/DL (ref 3.8–4.8)
ALBUMIN/GLOB SERPL: 1.6 {RATIO} (ref 1.2–2.2)
ALP SERPL-CCNC: 87 IU/L (ref 44–121)
ALT SERPL-CCNC: 28 IU/L (ref 0–44)
AST SERPL-CCNC: 28 IU/L (ref 0–40)
BILIRUB SERPL-MCNC: 0.8 MG/DL (ref 0–1.2)
BUN SERPL-MCNC: 16 MG/DL (ref 8–27)
BUN/CREAT SERPL: 21 (ref 10–24)
CALCIUM SERPL-MCNC: 10.4 MG/DL (ref 8.6–10.2)
CHLORIDE SERPL-SCNC: 102 MMOL/L (ref 96–106)
CHOLEST SERPL-MCNC: 220 MG/DL (ref 100–199)
CO2 SERPL-SCNC: 23 MMOL/L (ref 20–29)
CREAT SERPL-MCNC: 0.76 MG/DL (ref 0.76–1.27)
EGFR: 101 ML/MIN/1.73
GLOBULIN SER-MCNC: 2.9 G/DL (ref 1.5–4.5)
GLUCOSE SERPL-MCNC: 84 MG/DL (ref 70–99)
HDLC SERPL-MCNC: 48 MG/DL
LDLC SERPL CALC-MCNC: 149 MG/DL (ref 0–99)
MICRODELETION SYND BLD/T FISH: NORMAL
POTASSIUM SERPL-SCNC: 5 MMOL/L (ref 3.5–5.2)
PROT SERPL-MCNC: 7.5 G/DL (ref 6–8.5)
PSA SERPL-MCNC: 1.8 NG/ML (ref 0–4)
SL AMB VLDL CHOLESTEROL CALC: 23 MG/DL (ref 5–40)
SODIUM SERPL-SCNC: 140 MMOL/L (ref 134–144)
TRIGL SERPL-MCNC: 130 MG/DL (ref 0–149)

## 2022-11-14 ENCOUNTER — TELEPHONE (OUTPATIENT)
Dept: GASTROENTEROLOGY | Facility: CLINIC | Age: 62
End: 2022-11-14

## 2022-11-14 NOTE — TELEPHONE ENCOUNTER
lmom confirming pt's colonoscopy scheduled on 11/21/22 at [facility] with Dr Feng Maharaj   Informed [facility] would be calling this Thr or Friday  with the arrival time  Informed of clear liquid diet day prior as well as the bowel cleansing preparation  Informed would need a  the day of the procedure due to being under sedation  I asked pt to please call back if has not received instructions or if has any questions  Advised pt to contact insurance if has any questions regarding coverage for procedure

## 2022-11-17 ENCOUNTER — NURSE TRIAGE (OUTPATIENT)
Dept: OTHER | Facility: OTHER | Age: 62
End: 2022-11-17

## 2022-11-17 NOTE — TELEPHONE ENCOUNTER
Patient called saying that his wife had called in earlier regarding instructions for his colonoscopy prep but his wife phone kept rejecting the call, patient is asking if the office can give him a call back on his phone at ([phone]) regarding his instruction for his colonoscopy prep

## 2022-11-17 NOTE — TELEPHONE ENCOUNTER
Reason for Disposition  • Third attempt to contact caller AND no contact made  Phone number verified      Protocols used: NO CONTACT OR DUPLICATE CONTACT CALL-ADULT-

## 2022-11-17 NOTE — TELEPHONE ENCOUNTER
Regarding: colonoscopy prep  ----- Message from [facility] sent at 11/17/2022  2:32 PM EST -----  "My  needs colonoscopy prep instruction"

## 2022-11-18 DIAGNOSIS — Z12.11 COLON CANCER SCREENING: Primary | ICD-10-CM

## 2022-11-18 NOTE — TELEPHONE ENCOUNTER
Pt is scheduled for 11/21/22 and needs Golytely sent into his pharmacy (in chart)  I did email the pt his instructions  Thank you so much!

## 2022-11-21 ENCOUNTER — HOSPITAL ENCOUNTER (OUTPATIENT)
Dept: GASTROENTEROLOGY | Facility: AMBULATORY SURGERY CENTER | Age: 62
Discharge: HOME | End: 2022-11-21

## 2022-11-21 ENCOUNTER — ANESTHESIA (OUTPATIENT)
Dept: GASTROENTEROLOGY | Facility: AMBULATORY SURGERY CENTER | Age: 62
End: 2022-11-21

## 2022-11-21 ENCOUNTER — ANESTHESIA EVENT (OUTPATIENT)
Dept: GASTROENTEROLOGY | Facility: AMBULATORY SURGERY CENTER | Age: 62
End: 2022-11-21

## 2022-11-21 VITALS
BODY MASS INDEX: 24.25 KG/M2 | DIASTOLIC BLOOD PRESSURE: 78 MMHG | SYSTOLIC BLOOD PRESSURE: 120 MMHG | RESPIRATION RATE: 18 BRPM | HEART RATE: 78 BPM | TEMPERATURE: 97.6 F | OXYGEN SATURATION: 98 % | WEIGHT: 160 LBS | HEIGHT: 68 IN

## 2022-11-21 DIAGNOSIS — Z86.010 HISTORY OF COLON POLYPS: ICD-10-CM

## 2022-11-21 RX ORDER — PROPOFOL 10 MG/ML
INJECTION, EMULSION INTRAVENOUS AS NEEDED
Status: DISCONTINUED | OUTPATIENT
Start: 2022-11-21 | End: 2022-11-21

## 2022-11-21 RX ORDER — SODIUM CHLORIDE 9 MG/ML
INJECTION, SOLUTION INTRAVENOUS CONTINUOUS PRN
Status: DISCONTINUED | OUTPATIENT
Start: 2022-11-21 | End: 2022-11-21

## 2022-11-21 RX ADMIN — SODIUM CHLORIDE: 9 INJECTION, SOLUTION INTRAVENOUS at 12:31

## 2022-11-21 RX ADMIN — PROPOFOL 40 MG: 10 INJECTION, EMULSION INTRAVENOUS at 12:38

## 2022-11-21 RX ADMIN — PROPOFOL 40 MG: 10 INJECTION, EMULSION INTRAVENOUS at 12:35

## 2022-11-21 RX ADMIN — PROPOFOL 30 MG: 10 INJECTION, EMULSION INTRAVENOUS at 12:36

## 2022-11-21 RX ADMIN — PROPOFOL 50 MG: 10 INJECTION, EMULSION INTRAVENOUS at 12:43

## 2022-11-21 RX ADMIN — PROPOFOL 50 MG: 10 INJECTION, EMULSION INTRAVENOUS at 12:48

## 2022-11-21 RX ADMIN — PROPOFOL 40 MG: 10 INJECTION, EMULSION INTRAVENOUS at 12:39

## 2022-11-21 RX ADMIN — PROPOFOL 80 MG: 10 INJECTION, EMULSION INTRAVENOUS at 12:34

## 2022-11-21 NOTE — ANESTHESIA POSTPROCEDURE EVALUATION
Post-Op Assessment Note    CV Status:  Stable  Pain Score: 0    Pain management: adequate     Mental Status:  Alert and awake   Hydration Status:  Euvolemic   PONV Controlled:  Controlled   Airway Patency:  Patent      Post Op Vitals Reviewed: Yes      Staff: CRNA         No notable events documented      BP   110/69   Temp      Pulse 102   Resp   16   SpO2   97 ra

## 2022-11-21 NOTE — ANESTHESIA PREPROCEDURE EVALUATION
Procedure:  COLONOSCOPY    Relevant Problems   MUSCULOSKELETAL   (+) Seronegative rheumatoid arthritis (HCC)      NEURO/PSYCH   (+) Personal history of colonic polyps        Physical Exam    Airway    Mallampati score: II  TM Distance: >3 FB  Neck ROM: full     Dental   No notable dental hx     Cardiovascular  Cardiovascular exam normal    Pulmonary  Pulmonary exam normal     Other Findings        Anesthesia Plan  ASA Score- 2     Anesthesia Type- IV sedation with anesthesia with ASA Monitors  Additional Monitors:   Airway Plan:           Plan Factors-Exercise tolerance (METS): >4 METS  Chart reviewed  Imaging results reviewed  Existing labs reviewed  Patient summary reviewed  Patient is not a current smoker  Induction-     Postoperative Plan-     Informed Consent- Anesthetic plan and risks discussed with patient  I personally reviewed this patient with the CRNA  Discussed and agreed on the Anesthesia Plan with the CRNA  Shanta Boyd

## 2022-11-21 NOTE — H&P
History and Physical - [facility]  Brandi Flores 60 y o  male MRN: [id number]                  HPI: Brandi Floers is a 60y o  year old male who presents for history of polyp      REVIEW OF SYSTEMS: Per the HPI, and otherwise unremarkable  Historical Information   Past Medical History:   Diagnosis Date   • Colon polyp    • Leg pain     bilateral leg pain (chronic)   • Neoplasm of bone 2011   • Neoplasm of skin     lasr assessed 13, 5/25/15   • Rheumatoid arthritis ([facility] [address] )    • Suprapatellar bursitis of right knee      Past Surgical History:   Procedure Laterality Date   • COLONOSCOPY     • ESOPHAGOGASTRODUODENOSCOPY N/A 2018    Procedure: ESOPHAGOGASTRODUODENOSCOPY (EGD); Surgeon: Charanjit Calhoun MD;  Location:  GI LAB;   Service: Gastroenterology   • ROTATOR CUFF REPAIR     • ROTATOR CUFF REPAIR Left 2018    Procedure: SHOULDER ARTHROSCOPY WITH ROTATOR CUFF REPAIR, SUBACROMIAL DECOMPRESSION, LIMITED SYNOVECTOMY;  Surgeon: Jaxon Diana MD;  Location: [facility];  Service: Orthopedics   • SHOULDER ARTHROSCOPY         • TONSILLECTOMY       Social History   Social History     Substance and Sexual Activity   Alcohol Use Yes   • Alcohol/week: 1 0 standard drink   • Types: 1 Standard drinks or equivalent per week    Comment: social vodka     Social History     Substance and Sexual Activity   Drug Use No     Social History     Tobacco Use   Smoking Status Former   • Packs/day: 1 00   • Years: 15    • Pack years: 15 00   • Types: Cigarettes   • Quit date: 1986   • Years since quittin 7   Smokeless Tobacco Never     Family History   Problem Relation Age of Onset   • Hypertension Mother    • Diabetes Father    • Arthritis Father    • Diabetes Other        Meds/Allergies       Current Outpatient Medications:   •  Acetaminophen 036 MG CAPS  •  folic acid (FOLVITE) 1 mg tablet  •  methotrexate 2 5 mg tablet  •  benzonatate (TESSALON) 200 MG capsule  • fluticasone (FLONASE) 50 mcg/act nasal spray    Allergies   Allergen Reactions   • Azithromycin Rash and Hives       Objective     /87   Pulse (!) 115 Comment: 97  Temp 97 6 °F (36 4 °C) (Temporal)   Resp 18   Ht 5' 8" (1 727 m)   Wt 72 6 kg (160 lb)   SpO2 97%   BMI 24 33 kg/m²       PHYSICAL EXAM    Gen: NAD  Head: NCAT  CV: RRR  CHEST: Clear  ABD: soft, NT/ND  EXT: no edema      ASSESSMENT/PLAN:  This is a 60y o  year old male here for colonoscopy, and he is stable and optimized for his procedure

## 2022-12-13 PROBLEM — Z12.11 COLON CANCER SCREENING: Status: RESOLVED | Noted: 2022-10-14 | Resolved: 2022-12-13

## 2022-12-13 PROBLEM — Z13.83 SCREENING FOR CARDIOVASCULAR, RESPIRATORY, AND GENITOURINARY DISEASES: Status: RESOLVED | Noted: 2022-10-14 | Resolved: 2022-12-13

## 2022-12-13 PROBLEM — Z12.5 PROSTATE CANCER SCREENING: Status: RESOLVED | Noted: 2022-10-14 | Resolved: 2022-12-13

## 2022-12-13 PROBLEM — Z13.1 SCREENING FOR DIABETES MELLITUS (DM): Status: RESOLVED | Noted: 2022-10-14 | Resolved: 2022-12-13

## 2022-12-13 PROBLEM — Z13.6 SCREENING FOR CARDIOVASCULAR, RESPIRATORY, AND GENITOURINARY DISEASES: Status: RESOLVED | Noted: 2022-10-14 | Resolved: 2022-12-13

## 2022-12-13 PROBLEM — Z13.89 SCREENING FOR CARDIOVASCULAR, RESPIRATORY, AND GENITOURINARY DISEASES: Status: RESOLVED | Noted: 2022-10-14 | Resolved: 2022-12-13

## 2022-12-17 PROBLEM — Z00.00 HEALTHCARE MAINTENANCE: Status: ACTIVE | Noted: 2022-12-17

## 2022-12-20 ENCOUNTER — OFFICE VISIT (OUTPATIENT)
Dept: FAMILY MEDICINE CLINIC | Facility: CLINIC | Age: 62
End: 2022-12-20

## 2022-12-20 VITALS
HEIGHT: 67 IN | WEIGHT: 161.8 LBS | TEMPERATURE: 98.4 F | BODY MASS INDEX: 25.39 KG/M2 | OXYGEN SATURATION: 99 % | RESPIRATION RATE: 18 BRPM | HEART RATE: 70 BPM | SYSTOLIC BLOOD PRESSURE: 128 MMHG | DIASTOLIC BLOOD PRESSURE: 74 MMHG

## 2022-12-20 DIAGNOSIS — M06.00 SERONEGATIVE RHEUMATOID ARTHRITIS (HCC): ICD-10-CM

## 2022-12-20 DIAGNOSIS — Z91.89 FRAMINGHAM CARDIAC RISK 10-20% IN NEXT 10 YEARS: ICD-10-CM

## 2022-12-20 DIAGNOSIS — Z00.00 HEALTHCARE MAINTENANCE: Primary | ICD-10-CM

## 2022-12-20 PROBLEM — L08.9 INFECTED SEBACEOUS CYST: Status: RESOLVED | Noted: 2022-10-14 | Resolved: 2022-12-20

## 2022-12-20 PROBLEM — L72.3 INFECTED SEBACEOUS CYST: Status: RESOLVED | Noted: 2022-10-14 | Resolved: 2022-12-20

## 2022-12-20 NOTE — PROGRESS NOTES
Assessment/Plan:    No problem-specific Assessment & Plan notes found for this encounter  cpe  adacel utd per within past 4y per pt  brenda wnl  Declines statins for risk reduction   Rheum f/u for RA     Diagnoses and all orders for this visit:    Healthcare maintenance    Seronegative rheumatoid arthritis ([facility] [address] )    King cardiac risk 10-20% in next 10 years        Return if symptoms worsen or fail to improve  Subjective:      Patient ID: Kenya Ardon is a 60 y o  male  Chief Complaint   Patient presents with   • Physical Exam     Patient is here today for his annual physical, L Torres/LPN       HPI  Doing well  With RA  Controlled  Eating healthy  Exercises  Weight is back to pre RA days  No gym    Bowels and urine ok  MTX 4 x 2 5mg weekly    The following portions of the patient's history were reviewed and updated as appropriate: allergies, current medications, past family history, past medical history, past social history, past surgical history and problem list     Review of Systems   Constitutional: Negative for chills and fever  Current Outpatient Medications   Medication Sig Dispense Refill   • Acetaminophen 325 MG CAPS Take by mouth as needed      • folic acid (FOLVITE) 1 mg tablet Take 1 mg by mouth daily     • methotrexate 2 5 mg tablet Take 2 5 mg by mouth once a week 4 pills once weekly        No current facility-administered medications for this visit  Objective:    /74   Pulse 70   Temp 98 4 °F (36 9 °C) (Temporal)   Resp 18   Ht 5' 7" (1 702 m)   Wt 73 4 kg (161 lb 12 8 oz)   SpO2 99%   BMI 25 34 kg/m²        Physical Exam  Vitals and nursing note reviewed  Constitutional:       General: He is not in acute distress  Appearance: He is well-developed  He is not ill-appearing  HENT:      Head: Normocephalic  Right Ear: Tympanic membrane normal       Left Ear: Tympanic membrane normal    Eyes:      General: No scleral icterus       Conjunctiva/sclera: Conjunctivae normal    Neck:      Vascular: No carotid bruit  Cardiovascular:      Rate and Rhythm: Normal rate and regular rhythm  Heart sounds: No murmur heard  Pulmonary:      Effort: Pulmonary effort is normal  No respiratory distress  Breath sounds: No wheezing  Abdominal:      General: There is no distension  Palpations: Abdomen is soft  Tenderness: There is no abdominal tenderness  Musculoskeletal:         General: No deformity or signs of injury  Cervical back: Neck supple  Right lower leg: No edema  Left lower leg: No edema  Skin:     General: Skin is warm and dry  Coloration: Skin is not jaundiced or pale  Neurological:      Mental Status: He is alert  Motor: No weakness  Gait: Gait normal    Psychiatric:         Mood and Affect: Mood normal          Behavior: Behavior normal          Thought Content:  Thought content normal                 Farrah Chadwick DO

## 2023-02-15 PROBLEM — Z00.00 HEALTHCARE MAINTENANCE: Status: RESOLVED | Noted: 2022-12-17 | Resolved: 2023-02-15

## 2024-02-26 ENCOUNTER — OFFICE VISIT (OUTPATIENT)
Dept: URGENT CARE | Facility: CLINIC | Age: 64
End: 2024-02-26
Payer: COMMERCIAL

## 2024-02-26 VITALS
BODY MASS INDEX: 24.86 KG/M2 | SYSTOLIC BLOOD PRESSURE: 129 MMHG | DIASTOLIC BLOOD PRESSURE: 87 MMHG | TEMPERATURE: 97.3 F | HEART RATE: 80 BPM | RESPIRATION RATE: 20 BRPM | OXYGEN SATURATION: 98 % | WEIGHT: 164 LBS | HEIGHT: 68 IN

## 2024-02-26 DIAGNOSIS — B34.9 VIRAL SYNDROME: Primary | ICD-10-CM

## 2024-02-26 LAB — S PYO AG THROAT QL: NEGATIVE

## 2024-02-26 PROCEDURE — 99213 OFFICE O/P EST LOW 20 MIN: CPT | Performed by: PHYSICIAN ASSISTANT

## 2024-02-26 PROCEDURE — 87880 STREP A ASSAY W/OPTIC: CPT | Performed by: PHYSICIAN ASSISTANT

## 2024-02-26 PROCEDURE — 87070 CULTURE OTHR SPECIMN AEROBIC: CPT | Performed by: PHYSICIAN ASSISTANT

## 2024-02-26 RX ORDER — LIDOCAINE HYDROCHLORIDE 20 MG/ML
15 SOLUTION OROPHARYNGEAL 4 TIMES DAILY PRN
Qty: 300 ML | Refills: 0 | Status: SHIPPED | OUTPATIENT
Start: 2024-02-26

## 2024-02-26 RX ORDER — BENZONATATE 100 MG/1
100 CAPSULE ORAL 3 TIMES DAILY PRN
Qty: 20 CAPSULE | Refills: 0 | Status: SHIPPED | OUTPATIENT
Start: 2024-02-26

## 2024-02-26 NOTE — PATIENT INSTRUCTIONS
Negative strep test here.  I will send out your throat culture.  Take the Tessalon for cough and use the viscous lidocaine for the sore throat.

## 2024-02-26 NOTE — PROGRESS NOTES
[facility]        NAME: Wallace Perry is a 63 y.o. male  : 1960    MRN: [id number]  DATE: 2024  TIME: 4:40 PM    Assessment and Plan   Viral syndrome [B34.9]  1. Viral syndrome  POCT rapid ANTIGEN strepA    benzonatate (TESSALON PERLES) 100 mg capsule    Throat culture    Lidocaine Viscous HCl (XYLOCAINE) 2 % mucosal solution            Patient Instructions     Patient Instructions   Negative strep test here.  I will send out your throat culture.  Take the Tessalon for cough and use the viscous lidocaine for the sore throat.      Follow up with PCP in 3-5 days.  Proceed to  ER if symptoms worsen.    Chief Complaint     Chief Complaint   Patient presents with    Cold Like Symptoms     Voice loss since Friday Morning, cough, PND         History of Present Illness       The patient is a 63-year-old male presenting today for laryngitis, cough, postnasal drip, and rhinorrhea x 4 days.  Has been trying Mucinex but nothing is helping with his throat.  Reports that his grandson had strep 1 week ago.        Review of Systems   Review of Systems   Constitutional:  Negative for activity change, appetite change, chills, diaphoresis and fever.   HENT:  Positive for congestion, rhinorrhea and voice change. Negative for ear pain and sore throat.    Eyes:  Negative for pain and visual disturbance.   Respiratory:  Positive for cough. Negative for chest tightness and shortness of breath.    Cardiovascular:  Negative for chest pain and palpitations.   Gastrointestinal:  Negative for abdominal pain, diarrhea, nausea and vomiting.   Genitourinary:  Negative for dysuria and hematuria.   Musculoskeletal:  Negative for arthralgias, back pain and myalgias.   Skin:  Negative for color change, pallor and rash.   Neurological:  Negative for seizures, syncope and headaches.   All other systems reviewed and are negative.        Current Medications       Current Outpatient Medications:     Acetaminophen 325 MG  CAPS, Take by mouth as needed , Disp: , Rfl:     benzonatate (TESSALON PERLES) 100 mg capsule, Take 1 capsule (100 mg total) by mouth 3 (three) times a day as needed for cough, Disp: 20 capsule, Rfl: 0    folic acid (FOLVITE) 1 mg tablet, Take 1 mg by mouth daily, Disp: , Rfl:     Lidocaine Viscous HCl (XYLOCAINE) 2 % mucosal solution, Swish and spit 15 mL 4 (four) times a day as needed for mouth pain or discomfort, Disp: 300 mL, Rfl: 0    methotrexate 2.5 mg tablet, Take 2.5 mg by mouth once a week 4 pills once weekly , Disp: , Rfl:     Current Allergies     Allergies as of 02/26/2024 - Reviewed 02/26/2024   Allergen Reaction Noted    Azithromycin Rash and Hives 02/18/2017            The following portions of the patient's history were reviewed and updated as appropriate: allergies, current medications, past family history, past medical history, past social history, past surgical history and problem list.     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    Neoplasm of skin     lasr assessed 5/14/13, 5/25/15    Rheumatoid arthritis (HCC)     Suprapatellar bursitis of right knee        Past Surgical History:   Procedure Laterality Date    COLONOSCOPY      ESOPHAGOGASTRODUODENOSCOPY N/A 06/28/2018    Procedure: ESOPHAGOGASTRODUODENOSCOPY (EGD);  Surgeon: Darrion Oneil MD;  Location:  GI LAB;  Service: Gastroenterology    ROTATOR CUFF REPAIR      ROTATOR CUFF REPAIR Left 03/01/2018    Procedure: SHOULDER ARTHROSCOPY WITH ROTATOR CUFF REPAIR, SUBACROMIAL DECOMPRESSION, LIMITED SYNOVECTOMY;  Surgeon: Alejandro Veronica MD;  Location: [facility] OR;  Service: Orthopedics    SHOULDER ARTHROSCOPY      2015    TONSILLECTOMY         Family History   Problem Relation Age of Onset    Hypertension Mother     Diabetes Father     Arthritis Father     Diabetes Other          Medications have been verified.        Objective   /87   Pulse 80   " Temp (!) 97.3 °F (36.3 °C)   Resp 20   Ht 5' 8\" (1.727 m)   Wt 74.4 kg (164 lb)   SpO2 98%   BMI 24.94 kg/m²        Physical Exam     Physical Exam  Vitals and nursing note reviewed.   Constitutional:       General: He is not in acute distress.     Appearance: Normal appearance. He is normal weight. He is not ill-appearing, toxic-appearing or diaphoretic.   HENT:      Head: Normocephalic and atraumatic.      Right Ear: There is impacted cerumen.      Left Ear: There is impacted cerumen.      Nose: Nose normal. No congestion or rhinorrhea.      Mouth/Throat:      Mouth: Mucous membranes are moist.      Pharynx: Oropharynx is clear. Posterior oropharyngeal erythema present.   Eyes:      Extraocular Movements: Extraocular movements intact.      Conjunctiva/sclera: Conjunctivae normal.      Pupils: Pupils are equal, round, and reactive to light.   Cardiovascular:      Rate and Rhythm: Normal rate and regular rhythm.      Heart sounds: Normal heart sounds. No murmur heard.     No friction rub. No gallop.   Pulmonary:      Effort: Pulmonary effort is normal. No respiratory distress.      Breath sounds: Normal breath sounds. No stridor. No wheezing, rhonchi or rales.   Chest:      Chest wall: No tenderness.   Abdominal:      General: Abdomen is flat. Bowel sounds are normal. There is no distension.      Palpations: Abdomen is soft. There is no mass.      Tenderness: There is no abdominal tenderness. There is no guarding or rebound.      Hernia: No hernia is present.   Musculoskeletal:         General: Normal range of motion.      Cervical back: Normal range of motion.   Lymphadenopathy:      Cervical: No cervical adenopathy.   Skin:     General: Skin is warm and dry.      Capillary Refill: Capillary refill takes less than 2 seconds.   Neurological:      Mental Status: He is alert.                   "

## 2024-02-28 LAB — BACTERIA THROAT CULT: NORMAL

## 2024-08-19 ENCOUNTER — NURSE TRIAGE (OUTPATIENT)
Age: 64
End: 2024-08-19

## 2024-08-19 NOTE — TELEPHONE ENCOUNTER
Wallace states that he lifted something last week and his right waist/back  area  has been bothering him since then. He is not having severe pain. He thinks he pulled something. The pain has eased up since and it has gotten better each day. Appt made for tomorrow but he is aware to go to the ER if pain is severe. He does not feel any protrusion in his abdomen since lifting and bowels are normal, etc. At first, he wanted to wait for Dr Watts to be seen next week but agreed to come tomorrow Jan

## 2024-08-19 NOTE — TELEPHONE ENCOUNTER
Regarding: Abdominal pain  ----- Message from Dian MAYERS sent at 8/19/2024 10:18 AM EDT -----  Mary called in stated that Wallace has been having pain in his right side abdomin for some weeks now . She  as trying to get him in to see Dr Watts. She said she would call back if she couldn't get him to see any one else

## 2024-08-20 ENCOUNTER — OFFICE VISIT (OUTPATIENT)
Dept: FAMILY MEDICINE CLINIC | Facility: CLINIC | Age: 64
End: 2024-08-20
Payer: COMMERCIAL

## 2024-08-20 VITALS
DIASTOLIC BLOOD PRESSURE: 78 MMHG | SYSTOLIC BLOOD PRESSURE: 126 MMHG | HEART RATE: 90 BPM | HEIGHT: 68 IN | BODY MASS INDEX: 24.67 KG/M2 | WEIGHT: 162.8 LBS | RESPIRATION RATE: 18 BRPM | TEMPERATURE: 98 F

## 2024-08-20 DIAGNOSIS — R10.11 RUQ ABDOMINAL PAIN: Primary | ICD-10-CM

## 2024-08-20 DIAGNOSIS — M06.00 SERONEGATIVE RHEUMATOID ARTHRITIS (HCC): ICD-10-CM

## 2024-08-20 PROCEDURE — 99214 OFFICE O/P EST MOD 30 MIN: CPT | Performed by: NURSE PRACTITIONER

## 2024-08-20 NOTE — PROGRESS NOTES
"Ambulatory Visit  Name: Wallace Perry      : 1960      MRN: [id number]  Encounter Provider: MARK Hilton  Encounter Date: 2024   Encounter department: [facility]    Discussed that his pain may be secondary to a thoracic back strain after lifting, however given location and type of pain, would like to check an ultrasound to rule out any kind of gallbladder dysfunction.  He will call if he has any new symptoms, otherwise we will follow-up with those results  Assessment & Plan   1. RUQ abdominal pain  -     US abdomen limited; Future; Expected date: 2024  2. Seronegative rheumatoid arthritis (HCC)     History of Present Illness     Here today with complaints of right sided flank pain, ongoing for the past week.  States this started after he lifted something extremely heavy the day before.  He did not have any known injury or have any acute pain while lifting.  The pain radiates around to his abdomen, worse if he eats or after a large meal.  Also notices more intestinal gurgling in the evening hours.  No associated nausea, vomiting, fever, or changes in bowel habits.  He has not taken anything OTC for his symptoms        Review of Systems   Constitutional: Negative.    Gastrointestinal:  Positive for abdominal pain. Negative for diarrhea, nausea and vomiting.   Musculoskeletal:  Positive for back pain.       Objective     /78   Pulse 90   Temp 98 °F (36.7 °C)   Resp 18   Ht 5' 8\" (1.727 m)   Wt 73.8 kg (162 lb 12.8 oz)   BMI 24.75 kg/m²     Physical Exam  Vitals and nursing note reviewed.   Constitutional:       General: He is not in acute distress.     Appearance: Normal appearance. He is well-developed. He is not diaphoretic.   Eyes:      Conjunctiva/sclera: Conjunctivae normal.   Pulmonary:      Effort: Pulmonary effort is normal. No respiratory distress.   Abdominal:      Tenderness: There is abdominal tenderness in the right upper quadrant. Negative signs " include Edward's sign.   Neurological:      Mental Status: He is alert.   Psychiatric:         Mood and Affect: Mood normal.         Behavior: Behavior normal.       Administrative Statements

## 2024-08-23 ENCOUNTER — HOSPITAL ENCOUNTER (OUTPATIENT)
Dept: RADIOLOGY | Facility: HOSPITAL | Age: 64
Discharge: HOME | End: 2024-08-23
Payer: COMMERCIAL

## 2024-08-23 DIAGNOSIS — R10.11 RUQ ABDOMINAL PAIN: ICD-10-CM

## 2024-08-23 PROCEDURE — 76705 ECHO EXAM OF ABDOMEN: CPT

## 2024-09-24 NOTE — TELEPHONE ENCOUNTER
Teresa, can you please reach out to patient, I sent intake form and consent to your email  Thanks  None

## 2024-11-11 ENCOUNTER — OFFICE VISIT (OUTPATIENT)
Dept: FAMILY MEDICINE CLINIC | Facility: CLINIC | Age: 64
End: 2024-11-11
Payer: COMMERCIAL

## 2024-11-11 VITALS
TEMPERATURE: 98.5 F | SYSTOLIC BLOOD PRESSURE: 126 MMHG | DIASTOLIC BLOOD PRESSURE: 84 MMHG | RESPIRATION RATE: 18 BRPM | HEART RATE: 76 BPM | HEIGHT: 66 IN | WEIGHT: 161 LBS | BODY MASS INDEX: 25.88 KG/M2

## 2024-11-11 DIAGNOSIS — Z23 ENCOUNTER FOR IMMUNIZATION: ICD-10-CM

## 2024-11-11 DIAGNOSIS — Z00.00 HEALTHCARE MAINTENANCE: Primary | ICD-10-CM

## 2024-11-11 DIAGNOSIS — Z13.220 SCREENING FOR LIPID DISORDERS: ICD-10-CM

## 2024-11-11 DIAGNOSIS — M06.00 SERONEGATIVE RHEUMATOID ARTHRITIS (HCC): ICD-10-CM

## 2024-11-11 DIAGNOSIS — Z13.1 SCREENING FOR DIABETES MELLITUS (DM): ICD-10-CM

## 2024-11-11 DIAGNOSIS — Z12.5 PROSTATE CANCER SCREENING: ICD-10-CM

## 2024-11-11 DIAGNOSIS — Z23 IMMUNIZATION DUE: ICD-10-CM

## 2024-11-11 PROCEDURE — 90677 PCV20 VACCINE IM: CPT | Performed by: FAMILY MEDICINE

## 2024-11-11 PROCEDURE — 90662 IIV NO PRSV INCREASED AG IM: CPT | Performed by: FAMILY MEDICINE

## 2024-11-11 PROCEDURE — 90471 IMMUNIZATION ADMIN: CPT | Performed by: FAMILY MEDICINE

## 2024-11-11 PROCEDURE — 99397 PER PM REEVAL EST PAT 65+ YR: CPT | Performed by: FAMILY MEDICINE

## 2024-11-11 PROCEDURE — 90472 IMMUNIZATION ADMIN EACH ADD: CPT | Performed by: FAMILY MEDICINE

## 2024-11-11 NOTE — PATIENT INSTRUCTIONS
SHINGRIX is the new, more effective vaccine for Shingles.  It is more than 90% effective.  You should check with your local pharmacy and insurance company for availability and coverage.  It is a 2 shot series, with the second shot given between 2-6 months after the first, and is approved for ages 50 and up.

## 2024-11-11 NOTE — PROGRESS NOTES
"Assessment/Plan:    No problem-specific Assessment & Plan notes found for this encounter.         Diagnoses and all orders for this visit:    Healthcare maintenance    Encounter for immunization  -     influenza vaccine, high-dose, PF 0.5 mL (Fluzone High Dose)    Immunization due  -     Pneumococcal Conjugate Vaccine 20-valent (Pcv20)    Screening for diabetes mellitus (DM)  -     Comprehensive metabolic panel; Future  -     Comprehensive metabolic panel    Screening for lipid disorders  -     Lipid Panel with Direct LDL reflex; Future  -     Lipid Panel with Direct LDL reflex    Prostate cancer screening  -     PSA Total (Reflex To Free); Future  -     PSA Total (Reflex To Free)    Seronegative rheumatoid arthritis (HCC)              Return if symptoms worsen or fail to improve.    Subjective:      Patient ID: Wallace Perry is a 64 y.o. male.    Chief Complaint   Patient presents with    Physical Exam     Sas/cma       HPI  More supervisory type work duties  No stress  Exercising, no problems   On mtx weekly  Sees Dr Mandel  3-4 per week mtx, 2 was not effective    Eating healthy  Drinking water mostly  Can drink more    Urine/stool ok    Labs q6m from rheum usually    The following portions of the patient's history were reviewed and updated as appropriate: allergies, current medications, past family history, past medical history, past social history, past surgical history and problem list.    Review of Systems   Constitutional:  Negative for chills, fever and unexpected weight change.         Current Outpatient Medications   Medication Sig Dispense Refill    Acetaminophen 325 MG CAPS Take by mouth as needed       folic acid (FOLVITE) 1 mg tablet Take 1 mg by mouth daily      methotrexate 2.5 mg tablet Take 2.5 mg by mouth once a week 4 pills once weekly        No current facility-administered medications for this visit.       Objective:    /84   Pulse 76   Temp 98.5 °F (36.9 °C)   Resp 18   Ht 5' 6\" " (1.676 m)   Wt 73 kg (161 lb)   BMI 25.99 kg/m²        Physical Exam  Vitals and nursing note reviewed.   Constitutional:       General: He is not in acute distress.     Appearance: He is well-developed. He is not ill-appearing.   HENT:      Head: Normocephalic.      Right Ear: Tympanic membrane normal.      Left Ear: Tympanic membrane normal.   Eyes:      General: No scleral icterus.     Conjunctiva/sclera: Conjunctivae normal.   Neck:      Vascular: No carotid bruit.   Cardiovascular:      Rate and Rhythm: Normal rate and regular rhythm.      Heart sounds: No murmur heard.  Pulmonary:      Effort: Pulmonary effort is normal. No respiratory distress.      Breath sounds: No wheezing.   Abdominal:      General: There is no distension.      Palpations: Abdomen is soft.      Tenderness: There is no abdominal tenderness.      Hernia: No hernia is present.   Genitourinary:     Penis: Normal.       Testes: Normal.      Prostate: Normal.      Rectum: Normal.   Musculoskeletal:         General: No deformity.      Cervical back: Neck supple.      Right lower leg: No edema.      Left lower leg: No edema.   Skin:     General: Skin is warm and dry.      Coloration: Skin is not jaundiced or pale.   Neurological:      Mental Status: He is alert.      Motor: No weakness.      Gait: Gait normal.   Psychiatric:         Mood and Affect: Mood normal.         Behavior: Behavior normal.         Thought Content: Thought content normal.                Nasir Watts DO

## 2024-11-20 LAB
ALBUMIN SERPL-MCNC: 4.5 G/DL (ref 3.9–4.9)
ALP SERPL-CCNC: 89 IU/L (ref 44–121)
ALT SERPL-CCNC: 30 IU/L (ref 0–44)
AST SERPL-CCNC: 29 IU/L (ref 0–40)
BILIRUB SERPL-MCNC: 0.9 MG/DL (ref 0–1.2)
BUN SERPL-MCNC: 15 MG/DL (ref 8–27)
BUN/CREAT SERPL: 19 (ref 10–24)
CALCIUM SERPL-MCNC: 10.4 MG/DL (ref 8.6–10.2)
CHLORIDE SERPL-SCNC: 103 MMOL/L (ref 96–106)
CHOLEST SERPL-MCNC: 207 MG/DL (ref 100–199)
CO2 SERPL-SCNC: 23 MMOL/L (ref 20–29)
CREAT SERPL-MCNC: 0.79 MG/DL (ref 0.76–1.27)
EGFR: 99 ML/MIN/1.73
GLOBULIN SER-MCNC: 2.7 G/DL (ref 1.5–4.5)
GLUCOSE SERPL-MCNC: 76 MG/DL (ref 70–99)
HDLC SERPL-MCNC: 45 MG/DL
LDLC SERPL CALC-MCNC: 139 MG/DL (ref 0–99)
LDLC/HDLC SERPL: 3.1 RATIO (ref 0–3.6)
MICRODELETION SYND BLD/T FISH: NORMAL
POTASSIUM SERPL-SCNC: 4.4 MMOL/L (ref 3.5–5.2)
PROT SERPL-MCNC: 7.2 G/DL (ref 6–8.5)
PSA SERPL-MCNC: 2.2 NG/ML (ref 0–4)
SL AMB REFLEX CRITERIA: NORMAL
SL AMB VLDL CHOLESTEROL CALC: 23 MG/DL (ref 5–40)
SODIUM SERPL-SCNC: 140 MMOL/L (ref 134–144)
TRIGL SERPL-MCNC: 127 MG/DL (ref 0–149)

## 2024-11-24 ENCOUNTER — RESULTS FOLLOW-UP (OUTPATIENT)
Dept: FAMILY MEDICINE CLINIC | Facility: CLINIC | Age: 64
End: 2024-11-24

## 2025-01-20 ENCOUNTER — OFFICE VISIT (OUTPATIENT)
Dept: URGENT CARE | Facility: CLINIC | Age: 65
End: 2025-01-20
Payer: COMMERCIAL

## 2025-01-20 ENCOUNTER — APPOINTMENT (OUTPATIENT)
Dept: RADIOLOGY | Facility: CLINIC | Age: 65
End: 2025-01-20
Payer: COMMERCIAL

## 2025-01-20 VITALS
HEART RATE: 111 BPM | BODY MASS INDEX: 24.71 KG/M2 | SYSTOLIC BLOOD PRESSURE: 110 MMHG | TEMPERATURE: 98.5 F | OXYGEN SATURATION: 100 % | RESPIRATION RATE: 16 BRPM | DIASTOLIC BLOOD PRESSURE: 78 MMHG | HEIGHT: 68 IN | WEIGHT: 163 LBS

## 2025-01-20 DIAGNOSIS — R05.1 ACUTE COUGH: Primary | ICD-10-CM

## 2025-01-20 DIAGNOSIS — J39.8 CONGESTION OF UPPER RESPIRATORY TRACT: ICD-10-CM

## 2025-01-20 PROCEDURE — 99213 OFFICE O/P EST LOW 20 MIN: CPT

## 2025-01-20 PROCEDURE — 71046 X-RAY EXAM CHEST 2 VIEWS: CPT

## 2025-01-20 RX ORDER — PREDNISONE 10 MG/1
TABLET ORAL
Qty: 15 TABLET | Refills: 0 | Status: SHIPPED | OUTPATIENT
Start: 2025-01-20 | End: 2025-01-25

## 2025-01-20 NOTE — PATIENT INSTRUCTIONS
X-rays reviewed, no acute abnormality noted, awaiting official read.   Please begin steroid taper as directed.   Please continue albuterol inhaler and prescribed cough medication as directed.   Follow up with PCP if no relief within one week.   Go to ED for worsening symptoms.

## 2025-01-20 NOTE — PROGRESS NOTES
Assessment/Plan  X-rays reviewed, no acute abnormality noted, awaiting official read.   Please begin steroid taper as directed.   Please continue albuterol inhaler and prescribed cough medication as directed.   Follow up with PCP if no relief within one week.   Go to ED for worsening symptoms.     Acute cough [R05.1]  1. Acute cough  XR chest pa and lateral    predniSONE 10 mg tablet        Patient Education     Cough, Adult ED   General Information   You came to the Emergency Department (ED) for a cough. The doctors feel it is safe for you to recover at home. Many things can cause your cough. A cold or allergies can cause a cough. Other times, asthma or smoking can cause your cough. You can have a cough from mucus that drips down the back of your throat or from stomach acid that backs up into your throat. Sometimes a cough is a side effect from a drug. You may be waiting on some test results. If so, the staff will contact you if there are concerning results.  What care is needed at home?   Call your regular doctor to let them know you were in the ED. Make a follow-up appointment if you were told to.  To help you feel better:  Use a cool mist humidifier to avoid breathing dry air.  Use hard candy or cough drops to soothe sore throat and cough.  Gargle with salt water (mix 1/2 teaspoon salt with 1 cup warm water) a few times a day.  Spray saltwater mist in each nostril. Any normal saline spray works.  Sip warm liquids to keep your throat moist.  Take warm, steamy showers to help soothe the cough.  Do not smoke or be in smoke-filled places. Avoid things that may cause breathing problems like vaping, fumes, pollution, dust, and other common allergens.  You may want to use over-the-counter medicines for allergies or acid reflux if your cough is due to one of these problems.  You can also use an over-the-counter cough medicine.  When do I need to get emergency help?   Return to the ED if:   You have chest pain when you  cough or trouble breathing.  You start to cough up blood or yellow or green mucus.  When do I need to call the doctor?   You have a fever of 100.4°F (38°C) or higher or chills.  You cough so hard you throw up.  You are still coughing in 10 days.  You have new or worsening symptoms.  Last Reviewed Date   2020-07-15  Consumer Information Use and Disclaimer   This generalized information is a limited summary of diagnosis, treatment, and/or medication information. It is not meant to be comprehensive and should be used as a tool to help the user understand and/or assess potential diagnostic and treatment options. It does NOT include all information about conditions, treatments, medications, side effects, or risks that may apply to a specific patient. It is not intended to be medical advice or a substitute for the medical advice, diagnosis, or treatment of a health care provider based on the health care provider's examination and assessment of a patient’s specific and unique circumstances. Patients must speak with a health care provider for complete information about their health, medical questions, and treatment options, including any risks or benefits regarding use of medications. This information does not endorse any treatments or medications as safe, effective, or approved for treating a specific patient. [employer] and its affiliates disclaim any warranty or liability relating to this information or the use thereof. The use of this information is governed by the Terms of Use, available at https://www.[employer].com/en/know/clinical-effectiveness-terms   Copyright   Copyright © 2024 [employer] and its affiliates and/or licensors. All rights reserved.            Subjective:     Patient ID: Wallace Perry is a 64 y.o. male.      Reason For Visit / Chief Complaint  Chief Complaint   Patient presents with    Influenza     Tested positive for flu on Saturday.         Shortness of Breath  The current episode  started 1 to 4 weeks ago (Was diagnosed with influenza on Saturday.). The problem occurs intermittently. The problem is unchanged. Associated symptoms include coughing, orthopnea and rhinorrhea. Pertinent negatives include no chest pain, chest pressure, dizziness, fatigue, hoarseness of voice, leg swelling, palpitations, sore throat, stridor, sweats or wheezing. The symptoms are aggravated by any activity and lying flat. Past treatments include beta-agonist inhalers and one or more prescription drugs. The treatment provided no relief. There is no history of allergies, anxiety/panic attacks, asthma, bronchiolitis, congenital heart disease, DVT, GERD, PE, spontaneous pneumothorax or tobacco use. He has been Behaving normally.         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    Neoplasm of skin     lasr assessed 5/14/13, 5/25/15    Rheumatoid arthritis (HCC)     Suprapatellar bursitis of right knee        Past Surgical History:   Procedure Laterality Date    COLONOSCOPY      ESOPHAGOGASTRODUODENOSCOPY N/A 06/28/2018    Procedure: ESOPHAGOGASTRODUODENOSCOPY (EGD);  Surgeon: Darrion Oneil MD;  Location:  GI LAB;  Service: Gastroenterology    ROTATOR CUFF REPAIR      ROTATOR CUFF REPAIR Left 03/01/2018    Procedure: SHOULDER ARTHROSCOPY WITH ROTATOR CUFF REPAIR, SUBACROMIAL DECOMPRESSION, LIMITED SYNOVECTOMY;  Surgeon: Alejandro Veronica MD;  Location: [facility];  Service: Orthopedics    SHOULDER ARTHROSCOPY      2015    TONSILLECTOMY         Family History   Problem Relation Age of Onset    Hypertension Mother     Diabetes Father     Arthritis Father     Diabetes Other        Review of Systems   Constitutional:  Negative for fatigue and fever.   HENT:  Positive for congestion and rhinorrhea. Negative for ear discharge, ear pain, hoarse voice, postnasal drip, sinus pressure, sinus pain, sneezing and sore throat.    Eyes: Negative.  " Negative for pain, discharge, redness and itching.   Respiratory:  Positive for cough and shortness of breath. Negative for apnea, choking, chest tightness, wheezing and stridor.    Cardiovascular:  Positive for orthopnea. Negative for chest pain, palpitations and leg swelling.   Gastrointestinal: Negative.  Negative for diarrhea, nausea and vomiting.   Endocrine: Negative.  Negative for polydipsia, polyphagia and polyuria.   Genitourinary: Negative.  Negative for decreased urine volume and flank pain.   Musculoskeletal: Negative.  Negative for arthralgias, back pain, myalgias, neck pain and neck stiffness.   Skin: Negative.  Negative for color change and rash.   Allergic/Immunologic: Negative.  Negative for environmental allergies.   Neurological: Negative.  Negative for dizziness, facial asymmetry, light-headedness, numbness and headaches.   Hematological: Negative.  Negative for adenopathy.   Psychiatric/Behavioral: Negative.         Objective:    /78   Pulse (!) 111   Temp 98.5 °F (36.9 °C)   Resp 16   Ht 5' 8\" (1.727 m)   Wt 73.9 kg (163 lb)   SpO2 100%   BMI 24.78 kg/m²     Physical Exam  Vitals reviewed.   Constitutional:       General: He is not in acute distress.     Appearance: Normal appearance. He is not ill-appearing, toxic-appearing or diaphoretic.      Interventions: He is not intubated.  HENT:      Head: Normocephalic and atraumatic.      Right Ear: Tympanic membrane, ear canal and external ear normal. There is no impacted cerumen.      Left Ear: Tympanic membrane, ear canal and external ear normal. There is no impacted cerumen.      Nose: Nose normal. No congestion or rhinorrhea.      Mouth/Throat:      Mouth: Mucous membranes are moist.      Pharynx: Oropharynx is clear. Uvula midline. No pharyngeal swelling, oropharyngeal exudate, posterior oropharyngeal erythema or uvula swelling.      Tonsils: No tonsillar exudate or tonsillar abscesses. 1+ on the right. 1+ on the left.   Eyes:     "  Extraocular Movements: Extraocular movements intact.      Conjunctiva/sclera: Conjunctivae normal.      Pupils: Pupils are equal, round, and reactive to light.   Cardiovascular:      Rate and Rhythm: Normal rate and regular rhythm.      Pulses: Normal pulses.      Heart sounds: Normal heart sounds, S1 normal and S2 normal. Heart sounds not distant. No murmur heard.     No friction rub. No gallop.   Pulmonary:      Effort: Pulmonary effort is normal. No tachypnea, bradypnea, accessory muscle usage, prolonged expiration, respiratory distress or retractions. He is not intubated.      Breath sounds: No stridor, decreased air movement or transmitted upper airway sounds. Examination of the right-upper field reveals rhonchi. Examination of the left-upper field reveals rhonchi. Examination of the right-middle field reveals wheezing. Examination of the left-middle field reveals wheezing. Examination of the right-lower field reveals wheezing. Examination of the left-lower field reveals wheezing. Wheezing and rhonchi present. No decreased breath sounds or rales.   Chest:      Chest wall: No tenderness.   Musculoskeletal:         General: Normal range of motion.      Cervical back: Normal range of motion and neck supple. No rigidity or tenderness.   Lymphadenopathy:      Cervical: No cervical adenopathy.   Skin:     General: Skin is warm and dry.      Capillary Refill: Capillary refill takes less than 2 seconds.      Findings: No erythema.   Neurological:      General: No focal deficit present.      Mental Status: He is alert.   Psychiatric:         Mood and Affect: Mood normal.               Answers submitted by the patient for this visit:  Shortness of Breath Questionnaire (Submitted on 1/20/2025)  Chief Complaint: Shortness of breath  Chronicity: recurrent  sputum production: Yes  PND: Yes

## 2025-03-11 DIAGNOSIS — Z23 ENCOUNTER FOR IMMUNIZATION: Primary | ICD-10-CM

## (undated) DEVICE — ASTOUND IMPERVIOUS SURGICAL GOWN: Brand: CONVERTORS

## (undated) DEVICE — OCCLUSIVE GAUZE STRIP,3% BISMUTH TRIBROMOPHENATE IN PETROLATUM BLEND: Brand: XEROFORM

## (undated) DEVICE — SUT FIBERLINK #2 26IN AR-7235

## (undated) DEVICE — BURR  OVAL 4MM 13CM 8 FLUTE COOLCUT

## (undated) DEVICE — TIBURON BEACH CHAIR SHOULDER DRAPE: Brand: CONVERTORS

## (undated) DEVICE — NEEDLE SUT SCORPION MULTIFIRE

## (undated) DEVICE — GLOVE SRG BIOGEL 7.5

## (undated) DEVICE — CANNULA 5.75 X 70MM BARREL SHAPED BOWL

## (undated) DEVICE — FIBERTAPE 2MM X 7IN AR-7237-7

## (undated) DEVICE — BLADE SHAVER EXCALIBUR 4MM 13CM COOLCUT

## (undated) DEVICE — GLOVE INDICATOR PI UNDERGLOVE SZ 7.5 BLUE

## (undated) DEVICE — LABEL MEDICATION STERILE 2 YELLOW LIDOCAINE 2 BLUE MARCAINE 2 ORANGE HEPARIN

## (undated) DEVICE — VAPR COOLPULSE 90 ELECTRODE WITH HAND CONTROLS 90 DEGREES SUCTION WITH INTEGRATED HANDPIECE: Brand: VAPR COOLPULSE

## (undated) DEVICE — POSITIONER TRIMANO LIMB BEACH CHAIR

## (undated) DEVICE — CANNULA BUTTON 8 X 30MM PASSPORT

## (undated) DEVICE — PACK ARTHROSCOPY

## (undated) DEVICE — SPONGE GAUZE 4 X 8 12 PLY STRL LF

## (undated) DEVICE — INTENDED FOR TISSUE SEPARATION, AND OTHER PROCEDURES THAT REQUIRE A SHARP SURGICAL BLADE TO PUNCTURE OR CUT.: Brand: BARD-PARKER SAFETY BLADES SIZE 11, STERILE

## (undated) DEVICE — TUBING ARTHROSCOPIC WAVE  MAIN PUMP

## (undated) DEVICE — 3M™ TEGADERM™ TRANSPARENT FILM DRESSING FRAME STYLE, 1626W, 4 IN X 4-3/4 IN (10 CM X 12 CM), 50/CT 4CT/CASE: Brand: 3M™ TEGADERM™